# Patient Record
Sex: FEMALE | Race: WHITE | NOT HISPANIC OR LATINO | Employment: OTHER | URBAN - NONMETROPOLITAN AREA
[De-identification: names, ages, dates, MRNs, and addresses within clinical notes are randomized per-mention and may not be internally consistent; named-entity substitution may affect disease eponyms.]

---

## 2017-10-23 ENCOUNTER — LAB REQUISITION (OUTPATIENT)
Dept: LAB | Facility: HOSPITAL | Age: 77
End: 2017-10-23

## 2017-10-23 DIAGNOSIS — Z00.00 ROUTINE GENERAL MEDICAL EXAMINATION AT A HEALTH CARE FACILITY: ICD-10-CM

## 2017-10-23 LAB
ANION GAP SERPL CALCULATED.3IONS-SCNC: 13 MMOL/L (ref 4–13)
BASOPHILS # BLD AUTO: 0.03 10*3/MM3 (ref 0–0.2)
BASOPHILS NFR BLD AUTO: 0.4 % (ref 0–2)
BUN BLD-MCNC: 16 MG/DL (ref 5–21)
BUN/CREAT SERPL: 20.5 (ref 7–25)
CALCIUM SPEC-SCNC: 8.9 MG/DL (ref 8.4–10.4)
CHLORIDE SERPL-SCNC: 107 MMOL/L (ref 98–110)
CO2 SERPL-SCNC: 27 MMOL/L (ref 24–31)
CREAT BLD-MCNC: 0.78 MG/DL (ref 0.5–1.4)
DEPRECATED RDW RBC AUTO: 46.1 FL (ref 40–54)
EOSINOPHIL # BLD AUTO: 0.33 10*3/MM3 (ref 0–0.7)
EOSINOPHIL NFR BLD AUTO: 4.8 % (ref 0–4)
ERYTHROCYTE [DISTWIDTH] IN BLOOD BY AUTOMATED COUNT: 13.5 % (ref 12–15)
GFR SERPL CREATININE-BSD FRML MDRD: 71 ML/MIN/1.73
GLUCOSE BLD-MCNC: 122 MG/DL (ref 70–100)
HCT VFR BLD AUTO: 39.6 % (ref 37–47)
HGB BLD-MCNC: 12.2 G/DL (ref 12–16)
IMM GRANULOCYTES # BLD: 0.01 10*3/MM3 (ref 0–0.03)
IMM GRANULOCYTES NFR BLD: 0.1 % (ref 0–5)
LYMPHOCYTES # BLD AUTO: 1.89 10*3/MM3 (ref 0.72–4.86)
LYMPHOCYTES NFR BLD AUTO: 27.4 % (ref 15–45)
MCH RBC QN AUTO: 28.8 PG (ref 28–32)
MCHC RBC AUTO-ENTMCNC: 30.8 G/DL (ref 33–36)
MCV RBC AUTO: 93.4 FL (ref 82–98)
MONOCYTES # BLD AUTO: 0.62 10*3/MM3 (ref 0.19–1.3)
MONOCYTES NFR BLD AUTO: 9 % (ref 4–12)
NEUTROPHILS # BLD AUTO: 4.02 10*3/MM3 (ref 1.87–8.4)
NEUTROPHILS NFR BLD AUTO: 58.3 % (ref 39–78)
PLATELET # BLD AUTO: 199 10*3/MM3 (ref 130–400)
PMV BLD AUTO: 9.8 FL (ref 6–12)
POTASSIUM BLD-SCNC: 4.1 MMOL/L (ref 3.5–5.3)
RBC # BLD AUTO: 4.24 10*6/MM3 (ref 4.2–5.4)
SODIUM BLD-SCNC: 147 MMOL/L (ref 135–145)
WBC NRBC COR # BLD: 6.9 10*3/MM3 (ref 4.8–10.8)

## 2017-10-23 PROCEDURE — 85025 COMPLETE CBC W/AUTO DIFF WBC: CPT

## 2017-10-23 PROCEDURE — 80048 BASIC METABOLIC PNL TOTAL CA: CPT

## 2018-01-22 RX ORDER — DONEPEZIL HYDROCHLORIDE 5 MG/1
TABLET, FILM COATED ORAL
Qty: 30 TABLET | Refills: 5 | Status: SHIPPED | OUTPATIENT
Start: 2018-01-22 | End: 2019-12-31

## 2018-09-28 DIAGNOSIS — G89.4 CHRONIC PAIN SYNDROME: Primary | ICD-10-CM

## 2018-09-28 RX ORDER — TRAMADOL HYDROCHLORIDE 50 MG/1
50 TABLET ORAL 2 TIMES DAILY
Qty: 60 TABLET | Refills: 5 | OUTPATIENT
Start: 2018-09-28 | End: 2018-10-28

## 2019-02-02 ENCOUNTER — LAB REQUISITION (OUTPATIENT)
Dept: LAB | Facility: HOSPITAL | Age: 79
End: 2019-02-02

## 2019-02-02 DIAGNOSIS — Z00.00 ROUTINE GENERAL MEDICAL EXAMINATION AT A HEALTH CARE FACILITY: ICD-10-CM

## 2019-02-02 LAB
ALBUMIN SERPL-MCNC: 3.8 G/DL (ref 3.5–5)
ALBUMIN/GLOB SERPL: 1.1 G/DL (ref 1.1–2.5)
ALP SERPL-CCNC: 216 U/L (ref 24–120)
ALT SERPL W P-5'-P-CCNC: 885 U/L (ref 0–54)
ANION GAP SERPL CALCULATED.3IONS-SCNC: 12 MMOL/L (ref 4–13)
ANISOCYTOSIS BLD QL: ABNORMAL
AST SERPL-CCNC: 421 U/L (ref 7–45)
BILIRUB SERPL-MCNC: 0.6 MG/DL (ref 0.1–1)
BUN BLD-MCNC: 45 MG/DL (ref 5–21)
BUN/CREAT SERPL: 43.7 (ref 7–25)
CALCIUM SPEC-SCNC: 8.8 MG/DL (ref 8.4–10.4)
CHLORIDE SERPL-SCNC: 110 MMOL/L (ref 98–110)
CO2 SERPL-SCNC: 20 MMOL/L (ref 24–31)
CREAT BLD-MCNC: 1.03 MG/DL (ref 0.5–1.4)
DACRYOCYTES BLD QL SMEAR: ABNORMAL
DEPRECATED RDW RBC AUTO: 52.4 FL (ref 40–54)
EOSINOPHIL # BLD MANUAL: 0.51 10*3/MM3 (ref 0–0.7)
EOSINOPHIL NFR BLD MANUAL: 5.2 % (ref 0–4)
ERYTHROCYTE [DISTWIDTH] IN BLOOD BY AUTOMATED COUNT: 15.5 % (ref 12–15)
GFR SERPL CREATININE-BSD FRML MDRD: 52 ML/MIN/1.73
GLOBULIN UR ELPH-MCNC: 3.5 GM/DL
GLUCOSE BLD-MCNC: 202 MG/DL (ref 70–100)
HCT VFR BLD AUTO: 41.8 % (ref 37–47)
HGB BLD-MCNC: 13.2 G/DL (ref 12–16)
LYMPHOCYTES # BLD MANUAL: 0.81 10*3/MM3 (ref 0.72–4.86)
LYMPHOCYTES NFR BLD MANUAL: 10.3 % (ref 4–12)
LYMPHOCYTES NFR BLD MANUAL: 8.2 % (ref 15–45)
MCH RBC QN AUTO: 29.1 PG (ref 28–32)
MCHC RBC AUTO-ENTMCNC: 31.6 G/DL (ref 33–36)
MCV RBC AUTO: 92.3 FL (ref 82–98)
METAMYELOCYTES NFR BLD MANUAL: 2.1 % (ref 0–0)
MONOCYTES # BLD AUTO: 1.02 10*3/MM3 (ref 0.19–1.3)
NEUTROPHILS # BLD AUTO: 7.12 10*3/MM3 (ref 1.87–8.4)
NEUTROPHILS NFR BLD MANUAL: 71.1 % (ref 39–78)
NEUTS BAND NFR BLD MANUAL: 1 % (ref 0–10)
PLAT MORPH BLD: NORMAL
PLATELET # BLD AUTO: 207 10*3/MM3 (ref 130–400)
PMV BLD AUTO: 10.4 FL (ref 6–12)
POIKILOCYTOSIS BLD QL SMEAR: ABNORMAL
POTASSIUM BLD-SCNC: 4.9 MMOL/L (ref 3.5–5.3)
PROT SERPL-MCNC: 7.3 G/DL (ref 6.3–8.7)
RBC # BLD AUTO: 4.53 10*6/MM3 (ref 4.2–5.4)
SODIUM BLD-SCNC: 142 MMOL/L (ref 135–145)
VARIANT LYMPHS NFR BLD MANUAL: 2.1 % (ref 0–5)
WBC MORPH BLD: NORMAL
WBC NRBC COR # BLD: 9.86 10*3/MM3 (ref 4.8–10.8)

## 2019-02-02 PROCEDURE — 80053 COMPREHEN METABOLIC PANEL: CPT

## 2019-02-02 PROCEDURE — 85025 COMPLETE CBC W/AUTO DIFF WBC: CPT

## 2019-02-02 PROCEDURE — 85007 BL SMEAR W/DIFF WBC COUNT: CPT

## 2019-12-03 ENCOUNTER — CARE COORDINATION (OUTPATIENT)
Dept: CASE MANAGEMENT | Age: 79
End: 2019-12-03

## 2019-12-31 ENCOUNTER — APPOINTMENT (OUTPATIENT)
Dept: GENERAL RADIOLOGY | Age: 79
End: 2019-12-31
Payer: MEDICARE

## 2019-12-31 ENCOUNTER — APPOINTMENT (OUTPATIENT)
Dept: CT IMAGING | Age: 79
End: 2019-12-31
Payer: MEDICARE

## 2019-12-31 ENCOUNTER — HOSPITAL ENCOUNTER (EMERGENCY)
Age: 79
Discharge: ANOTHER HOSPITAL | End: 2019-12-31
Attending: EMERGENCY MEDICINE
Payer: MEDICARE

## 2019-12-31 VITALS
RESPIRATION RATE: 20 BRPM | TEMPERATURE: 98.5 F | HEART RATE: 90 BPM | DIASTOLIC BLOOD PRESSURE: 98 MMHG | OXYGEN SATURATION: 94 % | SYSTOLIC BLOOD PRESSURE: 195 MMHG

## 2019-12-31 LAB
ALBUMIN SERPL-MCNC: 2.7 G/DL (ref 3.5–5.2)
ALP BLD-CCNC: 711 U/L (ref 35–104)
ALT SERPL-CCNC: 120 U/L (ref 5–33)
ANION GAP SERPL CALCULATED.3IONS-SCNC: 13 MMOL/L (ref 7–19)
AST SERPL-CCNC: 74 U/L (ref 5–32)
BILIRUB SERPL-MCNC: 5.2 MG/DL (ref 0.2–1.2)
BUN BLDV-MCNC: 15 MG/DL (ref 8–23)
CALCIUM SERPL-MCNC: 9.1 MG/DL (ref 8.8–10.2)
CHLORIDE BLD-SCNC: 104 MMOL/L (ref 98–111)
CO2: 22 MMOL/L (ref 22–29)
CREAT SERPL-MCNC: <0.5 MG/DL (ref 0.5–0.9)
GFR NON-AFRICAN AMERICAN: >60
GLUCOSE BLD-MCNC: 209 MG/DL (ref 74–109)
HCT VFR BLD CALC: 32.4 % (ref 37–47)
HEMOGLOBIN: 10.1 G/DL (ref 12–16)
INR BLD: 1.04 (ref 0.88–1.18)
LACTIC ACID: 1.6 MMOL/L (ref 0.5–1.9)
LIPASE: 17 U/L (ref 13–60)
MCH RBC QN AUTO: 28.9 PG (ref 27–31)
MCHC RBC AUTO-ENTMCNC: 31.2 G/DL (ref 33–37)
MCV RBC AUTO: 92.6 FL (ref 81–99)
PDW BLD-RTO: 15.8 % (ref 11.5–14.5)
PLATELET # BLD: 296 K/UL (ref 130–400)
PMV BLD AUTO: 9.8 FL (ref 9.4–12.3)
POTASSIUM SERPL-SCNC: 4.3 MMOL/L (ref 3.5–5)
PROTHROMBIN TIME: 13 SEC (ref 12–14.6)
RAPID INFLUENZA  B AGN: NEGATIVE
RAPID INFLUENZA A AGN: NEGATIVE
RBC # BLD: 3.5 M/UL (ref 4.2–5.4)
SODIUM BLD-SCNC: 139 MMOL/L (ref 136–145)
TOTAL PROTEIN: 6.5 G/DL (ref 6.6–8.7)
WBC # BLD: 20 K/UL (ref 4.8–10.8)

## 2019-12-31 PROCEDURE — 99285 EMERGENCY DEPT VISIT HI MDM: CPT

## 2019-12-31 PROCEDURE — 80053 COMPREHEN METABOLIC PANEL: CPT

## 2019-12-31 PROCEDURE — 2580000003 HC RX 258: Performed by: EMERGENCY MEDICINE

## 2019-12-31 PROCEDURE — 96375 TX/PRO/DX INJ NEW DRUG ADDON: CPT

## 2019-12-31 PROCEDURE — 87040 BLOOD CULTURE FOR BACTERIA: CPT

## 2019-12-31 PROCEDURE — 6360000004 HC RX CONTRAST MEDICATION: Performed by: EMERGENCY MEDICINE

## 2019-12-31 PROCEDURE — 6360000002 HC RX W HCPCS: Performed by: EMERGENCY MEDICINE

## 2019-12-31 PROCEDURE — 96365 THER/PROPH/DIAG IV INF INIT: CPT

## 2019-12-31 PROCEDURE — 36415 COLL VENOUS BLD VENIPUNCTURE: CPT

## 2019-12-31 PROCEDURE — 2500000003 HC RX 250 WO HCPCS: Performed by: EMERGENCY MEDICINE

## 2019-12-31 PROCEDURE — 85610 PROTHROMBIN TIME: CPT

## 2019-12-31 PROCEDURE — 87804 INFLUENZA ASSAY W/OPTIC: CPT

## 2019-12-31 PROCEDURE — 83690 ASSAY OF LIPASE: CPT

## 2019-12-31 PROCEDURE — 83605 ASSAY OF LACTIC ACID: CPT

## 2019-12-31 PROCEDURE — 85027 COMPLETE CBC AUTOMATED: CPT

## 2019-12-31 PROCEDURE — 96366 THER/PROPH/DIAG IV INF ADDON: CPT

## 2019-12-31 PROCEDURE — 71045 X-RAY EXAM CHEST 1 VIEW: CPT

## 2019-12-31 PROCEDURE — 74177 CT ABD & PELVIS W/CONTRAST: CPT

## 2019-12-31 PROCEDURE — 70450 CT HEAD/BRAIN W/O DYE: CPT

## 2019-12-31 RX ORDER — TRAZODONE HYDROCHLORIDE 150 MG/1
150 TABLET ORAL NIGHTLY
Status: ON HOLD | COMMUNITY
End: 2020-05-20 | Stop reason: HOSPADM

## 2019-12-31 RX ORDER — HALOPERIDOL 1 MG/1
1 TABLET ORAL 3 TIMES DAILY
COMMUNITY

## 2019-12-31 RX ORDER — FLUDROCORTISONE ACETATE 0.1 MG/1
0.1 TABLET ORAL DAILY
COMMUNITY

## 2019-12-31 RX ORDER — SODIUM CHLORIDE 9 MG/ML
INJECTION, SOLUTION INTRAVENOUS CONTINUOUS
Status: DISCONTINUED | OUTPATIENT
Start: 2019-12-31 | End: 2019-12-31 | Stop reason: HOSPADM

## 2019-12-31 RX ORDER — POTASSIUM CHLORIDE 750 MG/1
10 TABLET, EXTENDED RELEASE ORAL DAILY
Status: ON HOLD | COMMUNITY
End: 2020-05-20 | Stop reason: HOSPADM

## 2019-12-31 RX ORDER — POLYETHYLENE GLYCOL 3350 17 G/17G
17 POWDER, FOR SOLUTION ORAL DAILY PRN
COMMUNITY

## 2019-12-31 RX ORDER — MEGESTROL ACETATE 40 MG/1
40 TABLET ORAL DAILY
COMMUNITY

## 2019-12-31 RX ORDER — SENNA PLUS 8.6 MG/1
1 TABLET ORAL 2 TIMES DAILY
COMMUNITY

## 2019-12-31 RX ADMIN — METRONIDAZOLE 500 MG: 500 INJECTION, SOLUTION INTRAVENOUS at 15:57

## 2019-12-31 RX ADMIN — SODIUM CHLORIDE: 9 INJECTION, SOLUTION INTRAVENOUS at 17:58

## 2019-12-31 RX ADMIN — IOPAMIDOL 90 ML: 755 INJECTION, SOLUTION INTRAVENOUS at 15:35

## 2019-12-31 RX ADMIN — CEFEPIME 2 G: 2 INJECTION, POWDER, FOR SOLUTION INTRAVENOUS at 15:44

## 2019-12-31 NOTE — ED PROVIDER NOTES
dailyHistorical Med      haloperidol (HALDOL) 1 MG tablet Take 1 mg by mouth 4 times dailyHistorical Med      potassium chloride (KLOR-CON M) 10 MEQ extended release tablet Take 10 mEq by mouth 2 times dailyHistorical Med      megestrol (MEGACE) 40 MG tablet Take 40 mg by mouth dailyHistorical Med      magnesium hydroxide (MILK OF MAGNESIA) 400 MG/5ML suspension Take by mouth daily as needed for ConstipationHistorical Med      polyethylene glycol (GLYCOLAX) packet Take 17 g by mouth daily as needed for ConstipationHistorical Med      senna (SENOKOT) 8.6 MG tablet Take 1 tablet by mouth dailyHistorical Med      traZODone (DESYREL) 150 MG tablet Take 150 mg by mouth nightlyHistorical Med      lisinopril (PRINIVIL;ZESTRIL) 40 MG tablet Take 1 tablet by mouth daily, Disp-30 tablet, R-0      risperiDONE (RISPERDAL) 0.25 MG tablet Take 1 tablet by mouth 2 times daily, Disp-60 tablet, R-0      amLODIPine (NORVASC) 2.5 MG tablet Take 1 tablet by mouth daily, Disp-30 tablet, R-0      mirtazapine (REMERON SOL-TAB) 15 MG disintegrating tablet Take 0.5 tablets by mouth nightly, Disp-15 tablet, R-0             ALLERGIES     Sulfa antibiotics and Ultram [tramadol]    FAMILY HISTORY     History reviewed. No pertinent family history. SOCIAL HISTORY       Social History     Socioeconomic History    Marital status:       Spouse name: None    Number of children: None    Years of education: None    Highest education level: None   Occupational History    None   Social Needs    Financial resource strain: None    Food insecurity:     Worry: None     Inability: None    Transportation needs:     Medical: None     Non-medical: None   Tobacco Use    Smoking status: Never Smoker    Smokeless tobacco: Never Used   Substance and Sexual Activity    Alcohol use: No     Alcohol/week: 0.0 standard drinks    Drug use: No    Sexual activity: None   Lifestyle    Physical activity:     Days per week: None     Minutes per session: None    Stress: None   Relationships    Social connections:     Talks on phone: None     Gets together: None     Attends Sikhism service: None     Active member of club or organization: None     Attends meetings of clubs or organizations: None     Relationship status: None    Intimate partner violence:     Fear of current or ex partner: None     Emotionally abused: None     Physically abused: None     Forced sexual activity: None   Other Topics Concern    None   Social History Narrative    None       SCREENINGS             PHYSICAL EXAM    (up to 7 for level 4, 8 or more for level 5)     ED Triage Vitals [12/31/19 1345]   BP Temp Temp Source Pulse Resp SpO2 Height Weight   118/64 98.5 °F (36.9 °C) Oral 85 20 91 % -- --       Physical Exam  Vitals signs reviewed. Constitutional:       General: She is sleeping. She is not in acute distress. Appearance: She is well-developed. She is ill-appearing. She is not toxic-appearing. HENT:      Head: Normocephalic and atraumatic. Right Ear: External ear normal.      Left Ear: External ear normal.      Mouth/Throat:      Mouth: Mucous membranes are moist.      Pharynx: Oropharynx is clear. Eyes:      General: Scleral icterus present. Pupils: Pupils are equal, round, and reactive to light. Neck:      Musculoskeletal: Normal range of motion and neck supple. No neck rigidity. Vascular: No JVD. Cardiovascular:      Rate and Rhythm: Normal rate and regular rhythm. Pulses: Normal pulses. Heart sounds: Normal heart sounds. Pulmonary:      Effort: Pulmonary effort is normal. No respiratory distress. Breath sounds: Normal breath sounds. Abdominal:      General: There is no distension. Palpations: Abdomen is soft. There is no pulsatile mass. Tenderness: There is generalized tenderness. There is no guarding or rebound. Musculoskeletal:         General: No tenderness.    Lymphadenopathy:      Cervical: No cervical adenopathy. Skin:     General: Skin is warm and dry. Capillary Refill: Capillary refill takes less than 2 seconds. Coloration: Skin is jaundiced. Neurological:      Mental Status: She is easily aroused. She is confused. Comments: Moving all 4 extremities equally. Limited exam.   Psychiatric:         Behavior: Behavior normal.         DIAGNOSTIC RESULTS     RADIOLOGY:   Non-plain film images such as CT, Ultrasound and MRI are read by the radiologist. [facility] images are visualized and preliminarily interpreted by the emergency physician with the below findings:    Interpretation per the Radiologist below, if available at the time of this note:    CT Head WO Contrast   Final Result   1. No acute intracranial findings. 2. Similar mild chronic microvascular changes and volume loss. Signed by Dr Eric August on 12/31/2019 3:51 PM      CT ABDOMEN PELVIS W IV CONTRAST Additional Contrast? None   Final Result   Marked dilatation of the common bile duct and intrahepatic   biliary ducts. The semiopaque objects in the distal common bile duct   which may represent sludge or nonopaque stones and the resultant   common duct obstruction. Further follow-up may be obtained. The diverticulosis of the distal colon. No evidence for   diverticulitis. A very small ill-defined low-density nodule in the tail of the   pancreas which may represent fat entrapment and a possible partial   volume averaging. A follow-up study in 6 was may be obtained to ensure   stability/resolution. A fat-containing left femoral hernia. Above findings are recorded on a digital voice clip in PACS. Signed by Dr Norma Arzola on 12/31/2019 4:03 PM      XR CHEST PORTABLE   Final Result   . No acute disease.    Signed by Dr Nicole Laguna on 12/31/2019 3:06 PM            LABS:  Labs Reviewed   CBC - Abnormal; Notable for the following components:       Result Value    WBC 20.0 (*)     RBC 3.50 (*)     Hemoglobin 10.1 (*) Hematocrit 32.4 (*)     MCHC 31.2 (*)     RDW 15.8 (*)     All other components within normal limits   COMPREHENSIVE METABOLIC PANEL - Abnormal; Notable for the following components:    Glucose 209 (*)     Total Protein 6.5 (*)     Alb 2.7 (*)     Total Bilirubin 5.2 (*)     Alkaline Phosphatase 711 (*)      (*)     AST 74 (*)     All other components within normal limits   RAPID INFLUENZA A/B ANTIGENS   CULTURE BLOOD #1   CULTURE BLOOD #2   LIPASE   LACTIC ACID, PLASMA   PROTIME-INR   URINE RT REFLEX TO CULTURE       All other labs were within normal range or not returned as of this dictation. EMERGENCY DEPARTMENT COURSE and DIFFERENTIALDIAGNOSIS/MDM:   Vitals:    Vitals:    12/31/19 1503 12/31/19 1602 12/31/19 1653 12/31/19 1732   BP: 137/65 (!) 184/75 (!) 183/89 (!) 195/98   Pulse: 86 86 89 90   Resp: 20 24 19 20   Temp:       TempSrc:       SpO2:  93% 94%        MDM  Obtained copy of ultrasound that had been done at nursing home which showed dilated common duct suspicious for an CBD obstruction and fatty liver. Case discussed with Dr. Kris Troncoso, GI. He said patient needs ERCP and we have no ERCP available. Recommend transfer to higher level of care. Will contact [address] [place] [address] see about transfer there. Patient's POA is here and updated about plan. Patient resting comfortably at this time. Patient's POA tells me that patient's confusion is baseline for her. [place] on diversion. [place] recommended transfer to [facility].  I spoke with Sylwia Bills who is a nurse practitioner working with Dr. Hang Dang, hospitalist.  Patient will be accepted on behalf of Dr. Hang Dang to [facility].  They have ERCP available there. Patient's POA updated about plan. Patient resting comfortably at this time. CONSULTS:  IP CONSULT TO GI    PROCEDURES:  Unless otherwise notedbelow, none     Procedures    FINAL IMPRESSION     1. Cholangitis    2.  Abnormal CT scan          DISPOSITION/PLAN   DISPOSITION Decision To Transfer 12/31/2019 04:44:18 PM      PATIENT REFERRED TO:  @FUP@    DISCHARGE MEDICATIONS:  Discharge Medication List as of 12/31/2019  7:33 PM             (Please note that portions of this note were completed with a voice recognition program.  Efforts were made to edit the dictations butoccasionally words are mis-transcribed.)    Anastasia Burdick MD (electronically signed)  AttendingEmergency Physician        Anastasia Burdick MD  12/31/19 3930

## 2019-12-31 NOTE — ED NOTES
Bed: 01-A  Expected date:   Expected time:   Means of arrival:   Comments:  EMS [address], RN  12/31/19 8752

## 2020-01-05 LAB
BLOOD CULTURE, ROUTINE: NORMAL
CULTURE, BLOOD 2: NORMAL

## 2020-01-08 RX ORDER — HYDROCODONE BITARTRATE AND ACETAMINOPHEN 5; 325 MG/1; MG/1
1 TABLET ORAL EVERY 6 HOURS PRN
Qty: 10 TABLET | Refills: 0 | Status: SHIPPED | OUTPATIENT
Start: 2020-01-08 | End: 2020-01-24

## 2020-01-24 RX ORDER — HYDROCODONE BITARTRATE AND ACETAMINOPHEN 5; 325 MG/1; MG/1
TABLET ORAL
Qty: 120 TABLET | Refills: 0 | Status: SHIPPED | OUTPATIENT
Start: 2020-01-24 | End: 2020-06-01

## 2020-02-05 ENCOUNTER — CARE COORDINATION (OUTPATIENT)
Dept: CASE MANAGEMENT | Age: 80
End: 2020-02-05

## 2020-02-05 NOTE — CARE COORDINATION
Patient is now LTC at [facility] on 2/1/20.       Gerardo Pierce, EMILIEN     [address] [facility] / [facility]

## 2020-02-13 RX ORDER — FENTANYL 25 UG/H
1 PATCH TRANSDERMAL
Qty: 10 PATCH | Refills: 0 | Status: SHIPPED | OUTPATIENT
Start: 2020-02-13 | End: 2020-03-04

## 2020-03-04 RX ORDER — FENTANYL 25 UG/H
PATCH TRANSDERMAL
Qty: 10 PATCH | Refills: 0 | Status: SHIPPED | OUTPATIENT
Start: 2020-03-04 | End: 2020-03-30

## 2020-03-30 RX ORDER — FENTANYL 25 UG/H
PATCH TRANSDERMAL
Qty: 10 PATCH | Refills: 0 | Status: SHIPPED | OUTPATIENT
Start: 2020-03-30 | End: 2020-05-11

## 2020-05-11 RX ORDER — FENTANYL 25 UG/H
PATCH TRANSDERMAL
Qty: 10 PATCH | Refills: 0 | Status: ON HOLD
Start: 2020-05-11 | End: 2020-05-20 | Stop reason: HOSPADM

## 2020-05-16 ENCOUNTER — APPOINTMENT (OUTPATIENT)
Dept: CT IMAGING | Age: 80
DRG: 175 | End: 2020-05-16
Payer: MEDICARE

## 2020-05-16 ENCOUNTER — APPOINTMENT (OUTPATIENT)
Dept: GENERAL RADIOLOGY | Age: 80
DRG: 175 | End: 2020-05-16
Payer: MEDICARE

## 2020-05-16 ENCOUNTER — HOSPITAL ENCOUNTER (INPATIENT)
Age: 80
LOS: 4 days | Discharge: NURSING, REHAB OR LONG-TERM CARE | DRG: 175 | End: 2020-05-20
Attending: EMERGENCY MEDICINE | Admitting: INTERNAL MEDICINE
Payer: MEDICARE

## 2020-05-16 PROBLEM — I26.99 ACUTE PULMONARY EMBOLISM WITHOUT ACUTE COR PULMONALE (HCC): Status: ACTIVE | Noted: 2020-05-16

## 2020-05-16 LAB
ALBUMIN SERPL-MCNC: 3.3 G/DL (ref 3.5–5.2)
ALP BLD-CCNC: 121 U/L (ref 35–104)
ALT SERPL-CCNC: 22 U/L (ref 5–33)
AMMONIA: 20 UMOL/L (ref 11–51)
ANION GAP SERPL CALCULATED.3IONS-SCNC: 15 MMOL/L (ref 7–19)
APTT: 20 SEC (ref 26–36.2)
AST SERPL-CCNC: 25 U/L (ref 5–32)
BASE EXCESS ARTERIAL: 0.6 MMOL/L (ref -2–2)
BASOPHILS ABSOLUTE: 0.1 K/UL (ref 0–0.2)
BASOPHILS RELATIVE PERCENT: 0.5 % (ref 0–1)
BILIRUB SERPL-MCNC: 0.5 MG/DL (ref 0.2–1.2)
BUN BLDV-MCNC: 40 MG/DL (ref 8–23)
CALCIUM SERPL-MCNC: 9.5 MG/DL (ref 8.8–10.2)
CARBOXYHEMOGLOBIN ARTERIAL: 2.6 % (ref 0–5)
CHLORIDE BLD-SCNC: 113 MMOL/L (ref 98–111)
CO2: 22 MMOL/L (ref 22–29)
CREAT SERPL-MCNC: 0.9 MG/DL (ref 0.5–0.9)
EOSINOPHILS ABSOLUTE: 0 K/UL (ref 0–0.6)
EOSINOPHILS RELATIVE PERCENT: 0.3 % (ref 0–5)
GFR NON-AFRICAN AMERICAN: 60
GLUCOSE BLD-MCNC: 310 MG/DL (ref 74–109)
HBA1C MFR BLD: 6.8 % (ref 4–6)
HCO3 ARTERIAL: 22.6 MMOL/L (ref 22–26)
HCT VFR BLD CALC: 47.6 % (ref 37–47)
HEMOGLOBIN, ART, EXTENDED: 15.7 G/DL (ref 12–16)
HEMOGLOBIN: 14.9 G/DL (ref 12–16)
IMMATURE GRANULOCYTES #: 0.2 K/UL
INR BLD: 1.09 (ref 0.88–1.18)
LACTIC ACID: 3 MMOL/L (ref 0.5–1.9)
LYMPHOCYTES ABSOLUTE: 1.9 K/UL (ref 1.1–4.5)
LYMPHOCYTES RELATIVE PERCENT: 12.5 % (ref 20–40)
MCH RBC QN AUTO: 29 PG (ref 27–31)
MCHC RBC AUTO-ENTMCNC: 31.3 G/DL (ref 33–37)
MCV RBC AUTO: 92.6 FL (ref 81–99)
METHEMOGLOBIN ARTERIAL: 1 %
MONOCYTES ABSOLUTE: 0.7 K/UL (ref 0–0.9)
MONOCYTES RELATIVE PERCENT: 4.6 % (ref 0–10)
NEUTROPHILS ABSOLUTE: 12.5 K/UL (ref 1.5–7.5)
NEUTROPHILS RELATIVE PERCENT: 80.7 % (ref 50–65)
O2 CONTENT ARTERIAL: 19.8 ML/DL
O2 SAT, ARTERIAL: 90.1 %
O2 THERAPY: ABNORMAL
PCO2 ARTERIAL: 29 MMHG (ref 35–45)
PDW BLD-RTO: 15.9 % (ref 11.5–14.5)
PH ARTERIAL: 7.5 (ref 7.35–7.45)
PLATELET # BLD: 186 K/UL (ref 130–400)
PMV BLD AUTO: 10.4 FL (ref 9.4–12.3)
PO2 ARTERIAL: 54 MMHG (ref 80–100)
POTASSIUM REFLEX MAGNESIUM: 4 MMOL/L (ref 3.5–5)
POTASSIUM, WHOLE BLOOD: 3.8
PRO-BNP: 517 PG/ML (ref 0–1800)
PROTHROMBIN TIME: 14.1 SEC (ref 12–14.6)
RBC # BLD: 5.14 M/UL (ref 4.2–5.4)
SODIUM BLD-SCNC: 150 MMOL/L (ref 136–145)
TOTAL PROTEIN: 7.6 G/DL (ref 6.6–8.7)
TROPONIN: 0.06 NG/ML (ref 0–0.03)
WBC # BLD: 15.5 K/UL (ref 4.8–10.8)

## 2020-05-16 PROCEDURE — 6370000000 HC RX 637 (ALT 250 FOR IP): Performed by: EMERGENCY MEDICINE

## 2020-05-16 PROCEDURE — 82803 BLOOD GASES ANY COMBINATION: CPT

## 2020-05-16 PROCEDURE — 70450 CT HEAD/BRAIN W/O DYE: CPT

## 2020-05-16 PROCEDURE — 96374 THER/PROPH/DIAG INJ IV PUSH: CPT

## 2020-05-16 PROCEDURE — 82140 ASSAY OF AMMONIA: CPT

## 2020-05-16 PROCEDURE — 85730 THROMBOPLASTIN TIME PARTIAL: CPT

## 2020-05-16 PROCEDURE — 6360000004 HC RX CONTRAST MEDICATION: Performed by: EMERGENCY MEDICINE

## 2020-05-16 PROCEDURE — 84132 ASSAY OF SERUM POTASSIUM: CPT

## 2020-05-16 PROCEDURE — 83036 HEMOGLOBIN GLYCOSYLATED A1C: CPT

## 2020-05-16 PROCEDURE — 80053 COMPREHEN METABOLIC PANEL: CPT

## 2020-05-16 PROCEDURE — 71275 CT ANGIOGRAPHY CHEST: CPT

## 2020-05-16 PROCEDURE — 85610 PROTHROMBIN TIME: CPT

## 2020-05-16 PROCEDURE — 87040 BLOOD CULTURE FOR BACTERIA: CPT

## 2020-05-16 PROCEDURE — 2580000003 HC RX 258: Performed by: EMERGENCY MEDICINE

## 2020-05-16 PROCEDURE — 93005 ELECTROCARDIOGRAM TRACING: CPT | Performed by: EMERGENCY MEDICINE

## 2020-05-16 PROCEDURE — 83880 ASSAY OF NATRIURETIC PEPTIDE: CPT

## 2020-05-16 PROCEDURE — 6360000002 HC RX W HCPCS: Performed by: EMERGENCY MEDICINE

## 2020-05-16 PROCEDURE — 2700000000 HC OXYGEN THERAPY PER DAY

## 2020-05-16 PROCEDURE — 83605 ASSAY OF LACTIC ACID: CPT

## 2020-05-16 PROCEDURE — 99285 EMERGENCY DEPT VISIT HI MDM: CPT

## 2020-05-16 PROCEDURE — 85025 COMPLETE CBC W/AUTO DIFF WBC: CPT

## 2020-05-16 PROCEDURE — 84484 ASSAY OF TROPONIN QUANT: CPT

## 2020-05-16 PROCEDURE — 36415 COLL VENOUS BLD VENIPUNCTURE: CPT

## 2020-05-16 PROCEDURE — 71045 X-RAY EXAM CHEST 1 VIEW: CPT

## 2020-05-16 PROCEDURE — 2000000000 HC ICU R&B

## 2020-05-16 PROCEDURE — 36600 WITHDRAWAL OF ARTERIAL BLOOD: CPT

## 2020-05-16 PROCEDURE — 94640 AIRWAY INHALATION TREATMENT: CPT

## 2020-05-16 RX ORDER — IPRATROPIUM BROMIDE AND ALBUTEROL SULFATE 2.5; .5 MG/3ML; MG/3ML
1 SOLUTION RESPIRATORY (INHALATION) ONCE
Status: COMPLETED | OUTPATIENT
Start: 2020-05-16 | End: 2020-05-16

## 2020-05-16 RX ORDER — HEPARIN SODIUM 1000 [USP'U]/ML
80 INJECTION, SOLUTION INTRAVENOUS; SUBCUTANEOUS PRN
Status: DISCONTINUED | OUTPATIENT
Start: 2020-05-16 | End: 2020-05-18 | Stop reason: ALTCHOICE

## 2020-05-16 RX ORDER — SODIUM CHLORIDE, SODIUM LACTATE, POTASSIUM CHLORIDE, AND CALCIUM CHLORIDE .6; .31; .03; .02 G/100ML; G/100ML; G/100ML; G/100ML
30 INJECTION, SOLUTION INTRAVENOUS ONCE
Status: COMPLETED | OUTPATIENT
Start: 2020-05-16 | End: 2020-05-17

## 2020-05-16 RX ORDER — HEPARIN SODIUM 1000 [USP'U]/ML
80 INJECTION, SOLUTION INTRAVENOUS; SUBCUTANEOUS ONCE
Status: COMPLETED | OUTPATIENT
Start: 2020-05-16 | End: 2020-05-16

## 2020-05-16 RX ORDER — HEPARIN SODIUM 10000 [USP'U]/100ML
18 INJECTION, SOLUTION INTRAVENOUS CONTINUOUS
Status: DISCONTINUED | OUTPATIENT
Start: 2020-05-16 | End: 2020-05-18

## 2020-05-16 RX ORDER — NALOXONE HYDROCHLORIDE 0.4 MG/ML
0.2 INJECTION, SOLUTION INTRAMUSCULAR; INTRAVENOUS; SUBCUTANEOUS ONCE
Status: COMPLETED | OUTPATIENT
Start: 2020-05-16 | End: 2020-05-16

## 2020-05-16 RX ORDER — HEPARIN SODIUM 1000 [USP'U]/ML
40 INJECTION, SOLUTION INTRAVENOUS; SUBCUTANEOUS PRN
Status: DISCONTINUED | OUTPATIENT
Start: 2020-05-16 | End: 2020-05-18 | Stop reason: ALTCHOICE

## 2020-05-16 RX ADMIN — HEPARIN SODIUM 3630 UNITS: 1000 INJECTION INTRAVENOUS; SUBCUTANEOUS at 22:27

## 2020-05-16 RX ADMIN — IPRATROPIUM BROMIDE AND ALBUTEROL SULFATE 1 AMPULE: 2.5; .5 SOLUTION RESPIRATORY (INHALATION) at 21:26

## 2020-05-16 RX ADMIN — IOPAMIDOL 90 ML: 755 INJECTION, SOLUTION INTRAVENOUS at 20:50

## 2020-05-16 RX ADMIN — SODIUM CHLORIDE, POTASSIUM CHLORIDE, SODIUM LACTATE AND CALCIUM CHLORIDE 1362 ML: 600; 310; 30; 20 INJECTION, SOLUTION INTRAVENOUS at 21:39

## 2020-05-16 RX ADMIN — HEPARIN SODIUM 18 UNITS/KG/HR: 10000 INJECTION, SOLUTION INTRAVENOUS at 22:30

## 2020-05-16 RX ADMIN — NALXONE HYDROCHLORIDE 0.2 MG: 0.4 INJECTION INTRAMUSCULAR; INTRAVENOUS; SUBCUTANEOUS at 20:44

## 2020-05-17 LAB
ANION GAP SERPL CALCULATED.3IONS-SCNC: 21 MMOL/L (ref 7–19)
APTT: 107.1 SEC (ref 26–36.2)
APTT: 83.9 SEC (ref 26–36.2)
APTT: 90.8 SEC (ref 26–36.2)
APTT: 93 SEC (ref 26–36.2)
BUN BLDV-MCNC: 30 MG/DL (ref 8–23)
CALCIUM SERPL-MCNC: 9.1 MG/DL (ref 8.8–10.2)
CHLORIDE BLD-SCNC: 106 MMOL/L (ref 98–111)
CO2: 23 MMOL/L (ref 22–29)
CREAT SERPL-MCNC: 0.9 MG/DL (ref 0.5–0.9)
GFR NON-AFRICAN AMERICAN: 60
GLUCOSE BLD-MCNC: 230 MG/DL (ref 74–109)
HCT VFR BLD CALC: 48.6 % (ref 37–47)
HEMOGLOBIN: 14.6 G/DL (ref 12–16)
LACTIC ACID, SEPSIS: 4 MG/DL (ref 0.5–1.9)
LACTIC ACID, SEPSIS: 4.6 MG/DL (ref 0.5–1.9)
LV EF: 58 %
LVEF MODALITY: NORMAL
MAGNESIUM: 2.2 MG/DL (ref 1.6–2.4)
MCH RBC QN AUTO: 28.5 PG (ref 27–31)
MCHC RBC AUTO-ENTMCNC: 30 G/DL (ref 33–37)
MCV RBC AUTO: 94.7 FL (ref 81–99)
PDW BLD-RTO: 15.9 % (ref 11.5–14.5)
PLATELET # BLD: 161 K/UL (ref 130–400)
PMV BLD AUTO: 11.3 FL (ref 9.4–12.3)
POTASSIUM REFLEX MAGNESIUM: 3.7 MMOL/L (ref 3.5–5)
RBC # BLD: 5.13 M/UL (ref 4.2–5.4)
SODIUM BLD-SCNC: 150 MMOL/L (ref 136–145)
TROPONIN: 0.05 NG/ML (ref 0–0.03)
TROPONIN: 0.06 NG/ML (ref 0–0.03)
WBC # BLD: 13.8 K/UL (ref 4.8–10.8)

## 2020-05-17 PROCEDURE — 36415 COLL VENOUS BLD VENIPUNCTURE: CPT

## 2020-05-17 PROCEDURE — 2000000000 HC ICU R&B

## 2020-05-17 PROCEDURE — 85730 THROMBOPLASTIN TIME PARTIAL: CPT

## 2020-05-17 PROCEDURE — 6360000002 HC RX W HCPCS: Performed by: HOSPITALIST

## 2020-05-17 PROCEDURE — 2700000000 HC OXYGEN THERAPY PER DAY

## 2020-05-17 PROCEDURE — 51798 US URINE CAPACITY MEASURE: CPT

## 2020-05-17 PROCEDURE — 83735 ASSAY OF MAGNESIUM: CPT

## 2020-05-17 PROCEDURE — 6370000000 HC RX 637 (ALT 250 FOR IP): Performed by: HOSPITALIST

## 2020-05-17 PROCEDURE — 83605 ASSAY OF LACTIC ACID: CPT

## 2020-05-17 PROCEDURE — 93306 TTE W/DOPPLER COMPLETE: CPT

## 2020-05-17 PROCEDURE — 2580000003 HC RX 258: Performed by: HOSPITALIST

## 2020-05-17 PROCEDURE — 87040 BLOOD CULTURE FOR BACTERIA: CPT

## 2020-05-17 PROCEDURE — 84484 ASSAY OF TROPONIN QUANT: CPT

## 2020-05-17 PROCEDURE — 85027 COMPLETE CBC AUTOMATED: CPT

## 2020-05-17 PROCEDURE — 80048 BASIC METABOLIC PNL TOTAL CA: CPT

## 2020-05-17 PROCEDURE — 87081 CULTURE SCREEN ONLY: CPT

## 2020-05-17 RX ORDER — PROMETHAZINE HYDROCHLORIDE 12.5 MG/1
12.5 TABLET ORAL EVERY 6 HOURS PRN
Status: DISCONTINUED | OUTPATIENT
Start: 2020-05-17 | End: 2020-05-20 | Stop reason: HOSPADM

## 2020-05-17 RX ORDER — HYDROCODONE BITARTRATE AND ACETAMINOPHEN 5; 325 MG/1; MG/1
1 TABLET ORAL 2 TIMES DAILY
Status: ON HOLD | COMMUNITY
End: 2020-05-20 | Stop reason: HOSPADM

## 2020-05-17 RX ORDER — SODIUM CHLORIDE 0.9 % (FLUSH) 0.9 %
10 SYRINGE (ML) INJECTION PRN
Status: DISCONTINUED | OUTPATIENT
Start: 2020-05-17 | End: 2020-05-17 | Stop reason: SDUPTHER

## 2020-05-17 RX ORDER — HALOPERIDOL 1 MG/1
1 TABLET ORAL 4 TIMES DAILY
Status: DISCONTINUED | OUTPATIENT
Start: 2020-05-17 | End: 2020-05-17

## 2020-05-17 RX ORDER — SODIUM CHLORIDE 0.9 % (FLUSH) 0.9 %
10 SYRINGE (ML) INJECTION PRN
Status: DISCONTINUED | OUTPATIENT
Start: 2020-05-17 | End: 2020-05-20 | Stop reason: HOSPADM

## 2020-05-17 RX ORDER — FENTANYL 25 UG/H
1 PATCH TRANSDERMAL
Status: DISCONTINUED | OUTPATIENT
Start: 2020-05-17 | End: 2020-05-17

## 2020-05-17 RX ORDER — ACETAMINOPHEN 325 MG/1
650 TABLET ORAL EVERY 6 HOURS PRN
Status: DISCONTINUED | OUTPATIENT
Start: 2020-05-17 | End: 2020-05-20 | Stop reason: HOSPADM

## 2020-05-17 RX ORDER — HYDRALAZINE HYDROCHLORIDE 20 MG/ML
10 INJECTION INTRAMUSCULAR; INTRAVENOUS EVERY 6 HOURS PRN
Status: DISCONTINUED | OUTPATIENT
Start: 2020-05-17 | End: 2020-05-20 | Stop reason: HOSPADM

## 2020-05-17 RX ORDER — LACTULOSE 10 G/15ML
30 SOLUTION ORAL DAILY PRN
COMMUNITY

## 2020-05-17 RX ORDER — SODIUM CHLORIDE 9 MG/ML
INJECTION, SOLUTION INTRAVENOUS CONTINUOUS
Status: DISCONTINUED | OUTPATIENT
Start: 2020-05-17 | End: 2020-05-18

## 2020-05-17 RX ORDER — SENNA PLUS 8.6 MG/1
1 TABLET ORAL DAILY
Status: DISCONTINUED | OUTPATIENT
Start: 2020-05-17 | End: 2020-05-20 | Stop reason: HOSPADM

## 2020-05-17 RX ORDER — HALOPERIDOL 5 MG/ML
2 INJECTION INTRAMUSCULAR EVERY 6 HOURS PRN
Status: DISCONTINUED | OUTPATIENT
Start: 2020-05-17 | End: 2020-05-20 | Stop reason: HOSPADM

## 2020-05-17 RX ORDER — BISACODYL 10 MG
10 SUPPOSITORY, RECTAL RECTAL DAILY PRN
COMMUNITY

## 2020-05-17 RX ORDER — ONDANSETRON 2 MG/ML
4 INJECTION INTRAMUSCULAR; INTRAVENOUS EVERY 6 HOURS PRN
Status: DISCONTINUED | OUTPATIENT
Start: 2020-05-17 | End: 2020-05-20 | Stop reason: HOSPADM

## 2020-05-17 RX ORDER — POLYETHYLENE GLYCOL 3350 17 G/17G
17 POWDER, FOR SOLUTION ORAL DAILY PRN
Status: DISCONTINUED | OUTPATIENT
Start: 2020-05-17 | End: 2020-05-20 | Stop reason: HOSPADM

## 2020-05-17 RX ORDER — RISPERIDONE 0.25 MG/1
0.25 TABLET, FILM COATED ORAL 2 TIMES DAILY
Status: DISCONTINUED | OUTPATIENT
Start: 2020-05-17 | End: 2020-05-17

## 2020-05-17 RX ORDER — SODIUM CHLORIDE 0.9 % (FLUSH) 0.9 %
10 SYRINGE (ML) INJECTION EVERY 12 HOURS SCHEDULED
Status: DISCONTINUED | OUTPATIENT
Start: 2020-05-17 | End: 2020-05-17 | Stop reason: SDUPTHER

## 2020-05-17 RX ORDER — MIRTAZAPINE 15 MG/1
7.5 TABLET, ORALLY DISINTEGRATING ORAL NIGHTLY
Status: DISCONTINUED | OUTPATIENT
Start: 2020-05-17 | End: 2020-05-17

## 2020-05-17 RX ORDER — ACETAMINOPHEN 650 MG/1
650 SUPPOSITORY RECTAL EVERY 6 HOURS PRN
Status: DISCONTINUED | OUTPATIENT
Start: 2020-05-17 | End: 2020-05-20 | Stop reason: HOSPADM

## 2020-05-17 RX ORDER — HALOPERIDOL 1 MG/1
1 TABLET ORAL 4 TIMES DAILY
Status: DISCONTINUED | OUTPATIENT
Start: 2020-05-17 | End: 2020-05-18

## 2020-05-17 RX ORDER — SODIUM CHLORIDE 0.9 % (FLUSH) 0.9 %
10 SYRINGE (ML) INJECTION EVERY 12 HOURS SCHEDULED
Status: DISCONTINUED | OUTPATIENT
Start: 2020-05-17 | End: 2020-05-20 | Stop reason: HOSPADM

## 2020-05-17 RX ORDER — TRAZODONE HYDROCHLORIDE 150 MG/1
150 TABLET ORAL NIGHTLY
Status: DISCONTINUED | OUTPATIENT
Start: 2020-05-17 | End: 2020-05-17

## 2020-05-17 RX ADMIN — SENNOSIDES 8.6 MG: 8.6 TABLET, FILM COATED ORAL at 09:02

## 2020-05-17 RX ADMIN — SODIUM CHLORIDE, PRESERVATIVE FREE 10 ML: 5 INJECTION INTRAVENOUS at 20:28

## 2020-05-17 RX ADMIN — METOPROLOL TARTRATE 25 MG: 25 TABLET, FILM COATED ORAL at 20:28

## 2020-05-17 RX ADMIN — METOPROLOL TARTRATE 25 MG: 25 TABLET, FILM COATED ORAL at 05:09

## 2020-05-17 RX ADMIN — HYDRALAZINE HYDROCHLORIDE 10 MG: 20 INJECTION INTRAMUSCULAR; INTRAVENOUS at 03:51

## 2020-05-17 RX ADMIN — SODIUM CHLORIDE: 9 INJECTION, SOLUTION INTRAVENOUS at 10:05

## 2020-05-17 RX ADMIN — SODIUM CHLORIDE, PRESERVATIVE FREE 10 ML: 5 INJECTION INTRAVENOUS at 09:03

## 2020-05-17 RX ADMIN — HALOPERIDOL 1 MG: 1 TABLET ORAL at 20:27

## 2020-05-17 RX ADMIN — HALOPERIDOL 1 MG: 1 TABLET ORAL at 17:34

## 2020-05-17 RX ADMIN — HALOPERIDOL 1 MG: 1 TABLET ORAL at 09:01

## 2020-05-17 RX ADMIN — SODIUM CHLORIDE: 9 INJECTION, SOLUTION INTRAVENOUS at 00:05

## 2020-05-17 RX ADMIN — HALOPERIDOL 1 MG: 1 TABLET ORAL at 13:23

## 2020-05-17 RX ADMIN — SODIUM CHLORIDE: 9 INJECTION, SOLUTION INTRAVENOUS at 21:26

## 2020-05-17 NOTE — ED PROVIDER NOTES
[address] EMERGENCY DEPT  eMERGENCY dEPARTMENT eNCOUnter      Pt Name: Lea Patterson  MRN: [id number]  [place] 1940  Date of evaluation: 5/16/2020  Provider: Fallon Patel MD    [address] [place] [address]       Chief Complaint   Patient presents with    Shortness of Breath     SOA 88% on O2 concentrator, 90% upon arrival with O2 @ 2L NC.         HISTORY OF PRESENT ILLNESS   (Location/Symptom, Timing/Onset,Context/Setting, Quality, Duration, Modifying Factors, Severity)  Note limiting factors. Lea Patterson is a 78 y.o. female who presents to the emergency department evaluation respiratory distress. 68-year-old female transferred from [facility] with respiratory distress low sat. And decreased responsiveness. She is here with her power of  she states she is unaware the patient had been ill until she got a call from the nursing home today. Staff reports nursing home states patient is a DNR but I do not have that documentation in the power of  is unaware. She states this patient is from [address] [place] [address] and never talks about death. She is had some significant comorbid conditions. In January she had a stent placed in her bile duct. Which is helped that condition. No history of congestive heart failure. The been no reports of fever. She is nonambulatory there is been no known trauma recently. The history is provided by a relative and medical records. NursingNotes were reviewed. REVIEW OF SYSTEMS    (2-9 systems for level 4, 10 or more for level 5)     Review of Systems   Unable to perform ROS: Patient unresponsive       A complete review of systems was performed and is negative except as noted above in the HPI.        PAST MEDICAL HISTORY     Past Medical History:   Diagnosis Date    Bipolar disorder, current episode mixed ([address]     Contracture, right knee     Dementia [facility]     Depression     Hyperlipidemia     Major depressive disorder     Osteoarthritis          SURGICAL HISTORY       Past Surgical History:   Procedure Laterality Date    CHOLECYSTECTOMY           CURRENT MEDICATIONS       Previous Medications    AMLODIPINE (NORVASC) 2.5 MG TABLET    Take 1 tablet by mouth daily    FENTANYL (DURAGESIC) 25 MCG/HR    APPLY 1 PATCH TO SKIN EVERY 72 HOURS, REMOVE EXISTING PATCH    FLUDROCORTISONE (FLORINEF) 0.1 MG TABLET    Take 0.1 mg by mouth daily    HALOPERIDOL (HALDOL) 1 MG TABLET    Take 1 mg by mouth 4 times daily    LISINOPRIL (PRINIVIL;ZESTRIL) 40 MG TABLET    Take 1 tablet by mouth daily    MAGNESIUM HYDROXIDE (MILK OF MAGNESIA) 400 MG/5ML SUSPENSION    Take by mouth daily as needed for Constipation    MEGESTROL (MEGACE) 40 MG TABLET    Take 40 mg by mouth daily    MENTHOL, TOPICAL ANALGESIC, 4 % GEL    Apply topically Indications: apply to lower back    MIRTAZAPINE (REMERON SOL-TAB) 15 MG DISINTEGRATING TABLET    Take 0.5 tablets by mouth nightly    POLYETHYLENE GLYCOL (GLYCOLAX) PACKET    Take 17 g by mouth daily as needed for Constipation    POTASSIUM CHLORIDE (KLOR-CON M) 10 MEQ EXTENDED RELEASE TABLET    Take 10 mEq by mouth 2 times daily    RISPERIDONE (RISPERDAL) 0.25 MG TABLET    Take 1 tablet by mouth 2 times daily    SENNA (SENOKOT) 8.6 MG TABLET    Take 1 tablet by mouth daily    TRAZODONE (DESYREL) 150 MG TABLET    Take 150 mg by mouth nightly       ALLERGIES     Sulfa antibiotics and Ultram [tramadol]    FAMILY HISTORY     History reviewed. No pertinent family history. SOCIAL HISTORY       Social History     Socioeconomic History    Marital status:       Spouse name: None    Number of children: None    Years of education: None    Highest education level: None   Occupational History    None   Social Needs    Financial resource strain: None    Food insecurity     Worry: None     Inability: None    Transportation needs     Medical: None     Non-medical: None   Tobacco Use    Smoking status: Never Smoker    Smokeless tobacco: Never Used   Substance and Sexual Activity    Alcohol use: No     Alcohol/week: 0.0 standard drinks    Drug use: No    Sexual activity: None   Lifestyle    Physical activity     Days per week: None     Minutes per session: None    Stress: None   Relationships    Social connections     Talks on phone: None     Gets together: None     Attends Faith service: None     Active member of club or organization: None     Attends meetings of clubs or organizations: None     Relationship status: None    Intimate partner violence     Fear of current or ex partner: None     Emotionally abused: None     Physically abused: None     Forced sexual activity: None   Other Topics Concern    None   Social History Narrative    None       SCREENINGS    Junie Coma Scale  Eye Opening: Spontaneous  Best Verbal Response: Incomprehensible speech  Best Motor Response: Localizes pain  Junie Coma Scale Score: 11        PHYSICAL EXAM    (up to 7 for level 4, 8 or more for level 5)     ED Triage Vitals   BP Temp Temp Source Pulse Resp SpO2 Height Weight   05/16/20 2007 05/16/20 2007 05/16/20 2007 05/16/20 2007 05/16/20 2007 05/16/20 2007 05/16/20 2004 05/16/20 2004   (!) 115/100 98.2 °F (36.8 °C) Axillary 113 30 (!) 82 % 5' (1.524 m) 100 lb (45.4 kg)       Physical Exam  Vitals signs and nursing note reviewed. Constitutional:       Appearance: She is well-developed. She is ill-appearing. HENT:      Head: Normocephalic and atraumatic. Comments: I do not see any obvious head injury     Right Ear: External ear normal.      Left Ear: External ear normal.      Mouth/Throat:      Pharynx: Oropharynx is clear. Eyes:      Pupils: Pupils are equal, round, and reactive to light. Neck:      Musculoskeletal: Neck rigidity present. Cardiovascular:      Rate and Rhythm: Regular rhythm. Tachycardia present. Pulses: Normal pulses. Heart sounds: Normal heart sounds.    Pulmonary:      Effort: Pulmonary CBC WITH AUTO DIFFERENTIAL - Abnormal; Notable for the following components:    WBC 15.5 (*)     Hematocrit 47.6 (*)     MCHC 31.3 (*)     RDW 15.9 (*)     Neutrophils % 80.7 (*)     Lymphocytes % 12.5 (*)     Neutrophils Absolute 12.5 (*)     All other components within normal limits   COMPREHENSIVE METABOLIC PANEL W/ REFLEX TO MG FOR LOW K - Abnormal; Notable for the following components:    Sodium 150 (*)     Chloride 113 (*)     Glucose 310 (*)     BUN 40 (*)     GFR Non- 60 (*)     Alb 3.3 (*)     Alkaline Phosphatase 121 (*)     All other components within normal limits   LACTIC ACID, PLASMA - Abnormal; Notable for the following components:    Lactic Acid 3.0 (*)     All other components within normal limits    Narrative:     CALL  [place]  [facility] tel. ,  Chemistry results called to and read back by Charley Bryant in ED, 05/16/2020  20:43, by TANI   TROPONIN - Abnormal; Notable for the following components:    Troponin 0.06 (*)     All other components within normal limits   APTT - Abnormal; Notable for the following components:    aPTT 20.0 (*)     All other components within normal limits   HEMOGLOBIN A1C - Abnormal; Notable for the following components:    Hemoglobin A1C 6.8 (*)     All other components within normal limits   CULTURE, BLOOD 1   CULTURE, BLOOD 2   POTASSIUM, WHOLE BLOOD   AMMONIA   BRAIN NATRIURETIC PEPTIDE   PROTIME-INR   APTT   APTT       All other labs were within normal range or not returned as of this dictation.     EMERGENCY DEPARTMENT COURSE and DIFFERENTIALDIAGNOSIS/MDM:   Vitals:    Vitals:    05/16/20 2100 05/16/20 2136 05/16/20 2200 05/16/20 2300   BP: (!) 178/96  (!) 141/88 (!) 162/103   Pulse: 117  107 95   Resp: (!) 31 26 21 26   Temp:       TempSrc:       SpO2: 94% 93% 93% 93%   Weight:       Height:           MDM  Number of Diagnoses or Management Options  Acute respiratory failure with hypoxia [facility]):   Bilateral pulmonary embolism ([facility]):   DNR (do not resuscitate):

## 2020-05-17 NOTE — ED NOTES
Respiratory at bedside for ABG. Patient's O2 sat dropped to 82% on room air. Per EMS, patient was on O2 concentrator with cannula in mouth, O2 sat around mid-80s. She was given Lasix 40mg IVP en route and O2 up to 90% on 5L.       Cee Newton RN  05/16/20 2010

## 2020-05-17 NOTE — PROGRESS NOTES
BLOOD GAS, ARTERIAL   Status:  Final result    Ref Range & Units 05/16/20 2022   pH, Arterial 7.350 - 7.450 7.500High     pCO2, Arterial 35.0 - 45.0 mmHg 29. 0Low     pO2, Arterial 80.0 - 100.0 mmHg 54. 0Low     HCO3, Arterial 22.0 - 26.0 mmol/L 22.6    Base Excess, Arterial -2.0 - 2.0 mmol/L 0.6    Hemoglobin, Art, Extended 12.0 - 16.0 g/dL 15.7    O2 Sat, Arterial >92 % 90.1    Carboxyhgb, Arterial 0.0 - 5.0 % 2.6    Comment:      0.0-1.5   (Smokers 1.5-5.0)    Methemoglobin, Arterial <1.5 % 1.0    O2 Content, Arterial Not Established mL/dL 19.8    O2 Therapy  Unknown    Resulting Agency  [facility]        Specimen Collected: 05/16/20 2022 Last Resulted: 05/16/20 2023 View Other Order Details        Room Air  RR

## 2020-05-17 NOTE — PROGRESS NOTES
pulmonary emboli. 2. Moderate clot burden. 3. No right heart enlargement. 4. No acute lung disease. Signed by Dr Juan José King on 5/16/2020 9:16 PM      CT Head WO Contrast   Final Result   1. Age-related changes with no acute intracranial abnormality. Signed by Dr Juan José King on 5/16/2020 9:13 PM      XR CHEST PORTABLE   Final Result   1. No acute disease. Signed by Dr Juan José King on 5/16/2020 8:35 PM        Micro:   Blood Culture, Routine   Date Value Ref Range Status   12/31/2019 No growth after 5 days of incubation. Final   , No components found for: LABURINE  Patient Active Problem List    Diagnosis Date Noted    Dementia with behavioral disturbance ([facility]      Priority: High    Psychosis ([facility]      Priority: Medium    Acute pulmonary embolism without acute cor pulmonale ([facility] 05/16/2020       Assessment/plan: Active Problems:    Acute pulmonary embolism without acute cor pulmonale (HCC)  Resolved Problems:    * No resolved hospital problems. *      Plan:   --Acute pulmonary embolism. Patient started on therapeutic IV heparin drip and admitted to the medicine floor. Start gentle IV fluid hydration and supplemental oxygen.   Echocardiogram and serial cardiac enzymes ordered for further evaluation.     -- DVT prophylaxis: Systemic anticoagulation with IV heparin drip.     -- CODE STATUS: DNR  5/17/20  PMH of dementia, HTN, HLD,polypharmacy on duragesic patch etc  ? DNR from NH presented with MS change CT head no acute, ABG showed acute hypoxemic respiratory failure, and hyperventilation with respiratory alkalosis, had dehydration, hypernatremia, CTA bilateral Pes no R ventricle strain , leukocytosis, better, BC -ve TD, CXR -ve , troponin marginal 2 to PE , lactic acidosis 3, started on heparin gtt, check B12 ammonia, TSh, hold CNS suppressants      Thelma Leon MD  Hospitalist Service  5/17/2020  8:27 AM

## 2020-05-17 NOTE — PROGRESS NOTES
Placed patient on 40% venturi mask. Pt's SAT is 93%.  Will continue to monitor and titrate oxygen as needed

## 2020-05-17 NOTE — H&P
Apply topically 2 times daily Indications: apply to lower back     Historical Provider, MD   fludrocortisone (FLORINEF) 0.1 MG tablet Take 0.1 mg by mouth daily    Historical Provider, MD   haloperidol (HALDOL) 1 MG tablet Take 1 mg by mouth 3 times daily     Historical Provider, MD   potassium chloride (KLOR-CON M) 10 MEQ extended release tablet Take 10 mEq by mouth daily     Historical Provider, MD   megestrol (MEGACE) 40 MG tablet Take 40 mg by mouth daily liquid    Historical Provider, MD   magnesium hydroxide (MILK OF MAGNESIA) 400 MG/5ML suspension Take 30 mLs by mouth daily as needed for Constipation     Historical Provider, MD   polyethylene glycol (GLYCOLAX) packet Take 17 g by mouth daily as needed for Constipation    Historical Provider, MD   senna (SENOKOT) 8.6 MG tablet Take 1 tablet by mouth 2 times daily     Historical Provider, MD   traZODone (DESYREL) 150 MG tablet Take 150 mg by mouth nightly    Historical Provider, MD   mirtazapine (REMERON SOL-TAB) 15 MG disintegrating tablet Take 0.5 tablets by mouth nightly 6/28/16   JORDON Hdz   lisinopril (PRINIVIL;ZESTRIL) 40 MG tablet Take 1 tablet by mouth daily 6/28/16   JORDON Hdz   risperiDONE (RISPERDAL) 0.25 MG tablet Take 1 tablet by mouth 2 times daily 6/28/16   JORDON Hdz   amLODIPine (NORVASC) 2.5 MG tablet Take 1 tablet by mouth daily 6/28/16   JORDON Hdz       Allergies:  Sulfa antibiotics and Ultram [tramadol]    Social History:   TOBACCO:   reports that she has never smoked. She has never used smokeless tobacco.  ETOH:   reports no history of alcohol use. Social History     Substance and Sexual Activity   Drug Use No       Family History:   History reviewed. No pertinent family history.         Physical Exam:    Vitals: BP (!) 116/53   Pulse 94   Temp 97.8 °F (36.6 °C) (Temporal)   Resp 27   Ht 5' (1.524 m)   Wt 105 lb 3.2 oz (47.7 kg)   SpO2 94%   BMI 20.55 kg/m²   24HR [facility])  Resolved Problems:    * No resolved hospital problems. *      Plan     --Acute pulmonary embolism. Patient started on therapeutic IV heparin drip and admitted to the medicine floor. Start gentle IV fluid hydration and supplemental oxygen. Echocardiogram and serial cardiac enzymes ordered for further evaluation. -- DVT prophylaxis: Systemic anticoagulation with IV heparin drip. -- CODE STATUS: DNR  Total face-to-face time with this patient, time spent reviewing medical records and in coordination of care with the emergency department physician, nursing staff was 75 minutes.     Signed:  Rosario Munson MD 5/17/2020 7:00 AM  Admitting Hospitalist

## 2020-05-17 NOTE — PROGRESS NOTES
Per nurse at [facility], patient is from [facility] and speaks [place] She said she does not talk much normally but she does communicate. Takes her medications crushed with applesauce. Patient appears lethargic and is alert to stimulation but not speaking.

## 2020-05-18 PROBLEM — Z51.5 PALLIATIVE CARE PATIENT: Status: ACTIVE | Noted: 2020-05-18

## 2020-05-18 LAB
ANION GAP SERPL CALCULATED.3IONS-SCNC: 11 MMOL/L (ref 7–19)
APTT: 65.8 SEC (ref 26–36.2)
APTT: 82.5 SEC (ref 26–36.2)
BILIRUBIN URINE: NEGATIVE
BLOOD, URINE: NEGATIVE
BUN BLDV-MCNC: 26 MG/DL (ref 8–23)
CALCIUM SERPL-MCNC: 8.7 MG/DL (ref 8.8–10.2)
CHLORIDE BLD-SCNC: 117 MMOL/L (ref 98–111)
CLARITY: CLEAR
CO2: 22 MMOL/L (ref 22–29)
COLOR: YELLOW
CREAT SERPL-MCNC: 0.7 MG/DL (ref 0.5–0.9)
EKG P AXIS: 72 DEGREES
EKG P-R INTERVAL: 128 MS
EKG Q-T INTERVAL: 318 MS
EKG QRS DURATION: 70 MS
EKG QTC CALCULATION (BAZETT): 409 MS
EKG T AXIS: 38 DEGREES
GFR NON-AFRICAN AMERICAN: >60
GLUCOSE BLD-MCNC: 139 MG/DL (ref 74–109)
GLUCOSE URINE: NEGATIVE MG/DL
HCT VFR BLD CALC: 43.1 % (ref 37–47)
HEMOGLOBIN: 12.3 G/DL (ref 12–16)
KETONES, URINE: NEGATIVE MG/DL
LACTIC ACID: 2.4 MMOL/L (ref 0.5–1.9)
LEUKOCYTE ESTERASE, URINE: NEGATIVE
MAGNESIUM: 2.2 MG/DL (ref 1.6–2.4)
MCH RBC QN AUTO: 29.1 PG (ref 27–31)
MCHC RBC AUTO-ENTMCNC: 28.5 G/DL (ref 33–37)
MCV RBC AUTO: 101.9 FL (ref 81–99)
MRSA CULTURE ONLY: NORMAL
NITRITE, URINE: NEGATIVE
PDW BLD-RTO: 15.9 % (ref 11.5–14.5)
PH UA: 6.5 (ref 5–8)
PLATELET # BLD: 147 K/UL (ref 130–400)
PMV BLD AUTO: 10.6 FL (ref 9.4–12.3)
POTASSIUM REFLEX MAGNESIUM: 3.6 MMOL/L (ref 3.5–5)
PROTEIN UA: NEGATIVE MG/DL
RBC # BLD: 4.23 M/UL (ref 4.2–5.4)
SODIUM BLD-SCNC: 150 MMOL/L (ref 136–145)
SPECIFIC GRAVITY UA: 1.03 (ref 1–1.03)
UROBILINOGEN, URINE: 1 E.U./DL
WBC # BLD: 16.2 K/UL (ref 4.8–10.8)

## 2020-05-18 PROCEDURE — 2700000000 HC OXYGEN THERAPY PER DAY

## 2020-05-18 PROCEDURE — 6360000002 HC RX W HCPCS: Performed by: HOSPITALIST

## 2020-05-18 PROCEDURE — 2580000003 HC RX 258: Performed by: HOSPITALIST

## 2020-05-18 PROCEDURE — 83605 ASSAY OF LACTIC ACID: CPT

## 2020-05-18 PROCEDURE — 6370000000 HC RX 637 (ALT 250 FOR IP): Performed by: HOSPITALIST

## 2020-05-18 PROCEDURE — 81003 URINALYSIS AUTO W/O SCOPE: CPT

## 2020-05-18 PROCEDURE — 36415 COLL VENOUS BLD VENIPUNCTURE: CPT

## 2020-05-18 PROCEDURE — 51798 US URINE CAPACITY MEASURE: CPT

## 2020-05-18 PROCEDURE — 93010 ELECTROCARDIOGRAM REPORT: CPT | Performed by: INTERNAL MEDICINE

## 2020-05-18 PROCEDURE — 85027 COMPLETE CBC AUTOMATED: CPT

## 2020-05-18 PROCEDURE — 51701 INSERT BLADDER CATHETER: CPT

## 2020-05-18 PROCEDURE — 85730 THROMBOPLASTIN TIME PARTIAL: CPT

## 2020-05-18 PROCEDURE — 83735 ASSAY OF MAGNESIUM: CPT

## 2020-05-18 PROCEDURE — 80048 BASIC METABOLIC PNL TOTAL CA: CPT

## 2020-05-18 PROCEDURE — 2000000000 HC ICU R&B

## 2020-05-18 RX ORDER — DEXTROSE AND SODIUM CHLORIDE 5; .45 G/100ML; G/100ML
INJECTION, SOLUTION INTRAVENOUS CONTINUOUS
Status: DISCONTINUED | OUTPATIENT
Start: 2020-05-18 | End: 2020-05-19

## 2020-05-18 RX ORDER — HALOPERIDOL 1 MG/1
1 TABLET ORAL 2 TIMES DAILY
Status: DISCONTINUED | OUTPATIENT
Start: 2020-05-18 | End: 2020-05-20 | Stop reason: HOSPADM

## 2020-05-18 RX ADMIN — SENNOSIDES 8.6 MG: 8.6 TABLET, FILM COATED ORAL at 14:49

## 2020-05-18 RX ADMIN — SODIUM CHLORIDE, PRESERVATIVE FREE 10 ML: 5 INJECTION INTRAVENOUS at 09:58

## 2020-05-18 RX ADMIN — HEPARIN SODIUM 12 UNITS/KG/HR: 10000 INJECTION, SOLUTION INTRAVENOUS at 02:35

## 2020-05-18 RX ADMIN — DEXTROSE AND SODIUM CHLORIDE: 5; 450 INJECTION, SOLUTION INTRAVENOUS at 22:17

## 2020-05-18 RX ADMIN — APIXABAN 10 MG: 5 TABLET, FILM COATED ORAL at 14:40

## 2020-05-18 RX ADMIN — DEXTROSE AND SODIUM CHLORIDE: 5; 450 INJECTION, SOLUTION INTRAVENOUS at 09:58

## 2020-05-18 RX ADMIN — SODIUM CHLORIDE, PRESERVATIVE FREE 10 ML: 5 INJECTION INTRAVENOUS at 21:00

## 2020-05-18 RX ADMIN — APIXABAN 10 MG: 5 TABLET, FILM COATED ORAL at 23:48

## 2020-05-18 RX ADMIN — SODIUM CHLORIDE: 9 INJECTION, SOLUTION INTRAVENOUS at 04:24

## 2020-05-18 ASSESSMENT — PAIN SCALES - GENERAL: PAINLEVEL_OUTOF10: 0

## 2020-05-18 NOTE — PLAN OF CARE
Nutrition Problem: Altered nutrition-related lab values  Intervention: Food and/or Nutrient Delivery: Modify current diet, Start ONS  Nutritional Goals: Blood glucose levels 150 or below

## 2020-05-18 NOTE — PROGRESS NOTES
Nutrition Assessment    Type and Reason for Visit: Initial, Positive Nutrition Screen    Nutrition Recommendations: Modify diet to Dysphagia II, Carb 4. Glucerna TID. Nutrition Assessment: Pt is nutritionally compromised AEB endocrine dysfunction and abnormal lab values. Pt is at risk for further compromise d/t h/o c/s difficulty and inadequate oral intake. Will modify diet to Dysphagia II, Carb 4 and start Glucerna TID.      Nutrition Risk Level: High    Nutrient Needs:  · Estimated Daily Total Kcal: 2079-5660 kcals/day  · Estimated Daily Protein (g): 49-98 g/PRO/day  · Estimated Daily Total Fluid (ml/day): 8325-3894 mL/day    Nutrition Diagnosis:   · Problem: Altered nutrition-related lab values  · Etiology: related to Endocrine dysfunction     Signs and symptoms:  as evidenced by Lab values    Objective Information:  · Current Nutrition Therapies:  · Oral Diet Orders: Cardiac   · Anthropometric Measures:  · Ht: 5' (152.4 cm)   · Current Body Wt: 108 lb (49 kg)  · BMI Classification: BMI 18.5 - 24.9 Normal Weight    Nutrition Interventions:   Modify current diet, Start ONS  Continued Inpatient Monitoring    Nutrition Evaluation:   · Evaluation: Goals set   · Goals: Blood glucose levels 150 or below    · Monitoring: Nutrition Progression, Meal Intake, Supplement Intake, Diet Tolerance, Weight, Pertinent Labs, Chewing/Swallowing      Electronically signed by Easton Tavera MS, RD, LD on 5/18/20 at 4:48 PM CDT    Contact Number: [phone]

## 2020-05-18 NOTE — PROGRESS NOTES
Patient would not awaken fully for safe p.o. trials. When questioned about accepting p.o. trials she shook her head no. Nursing reports similar behavior. Will return when patient will participate in full evaluation.         Electronically Signed By:  Eliezer Lilly M.S., CCC-SLP  5/18/2020,9:12 AM.

## 2020-05-18 NOTE — CARE COORDINATION
5/18/20: Pt is from [facility].   [facility]  P: [phone]  F: [phone]  Electronically signed by DOUG Jaffe on 5/18/2020 at 2:17 PM

## 2020-05-19 LAB
HCT VFR BLD CALC: 33.3 % (ref 37–47)
HEMOGLOBIN: 9.9 G/DL (ref 12–16)
MCH RBC QN AUTO: 29.1 PG (ref 27–31)
MCHC RBC AUTO-ENTMCNC: 29.7 G/DL (ref 33–37)
MCV RBC AUTO: 97.9 FL (ref 81–99)
PDW BLD-RTO: 15.5 % (ref 11.5–14.5)
PLATELET # BLD: 134 K/UL (ref 130–400)
PMV BLD AUTO: 10.3 FL (ref 9.4–12.3)
RBC # BLD: 3.4 M/UL (ref 4.2–5.4)
WBC # BLD: 11.5 K/UL (ref 4.8–10.8)

## 2020-05-19 PROCEDURE — 92610 EVALUATE SWALLOWING FUNCTION: CPT

## 2020-05-19 PROCEDURE — 85027 COMPLETE CBC AUTOMATED: CPT

## 2020-05-19 PROCEDURE — 1210000000 HC MED SURG R&B

## 2020-05-19 PROCEDURE — 2700000000 HC OXYGEN THERAPY PER DAY

## 2020-05-19 PROCEDURE — 6370000000 HC RX 637 (ALT 250 FOR IP): Performed by: INTERNAL MEDICINE

## 2020-05-19 PROCEDURE — 6370000000 HC RX 637 (ALT 250 FOR IP): Performed by: HOSPITALIST

## 2020-05-19 PROCEDURE — 2580000003 HC RX 258: Performed by: INTERNAL MEDICINE

## 2020-05-19 PROCEDURE — 36415 COLL VENOUS BLD VENIPUNCTURE: CPT

## 2020-05-19 RX ORDER — DEXTROSE MONOHYDRATE 50 MG/ML
INJECTION, SOLUTION INTRAVENOUS CONTINUOUS
Status: DISCONTINUED | OUTPATIENT
Start: 2020-05-19 | End: 2020-05-20 | Stop reason: HOSPADM

## 2020-05-19 RX ADMIN — HALOPERIDOL 1 MG: 1 TABLET ORAL at 21:14

## 2020-05-19 RX ADMIN — METOPROLOL TARTRATE 25 MG: 25 TABLET, FILM COATED ORAL at 08:11

## 2020-05-19 RX ADMIN — DEXTROSE MONOHYDRATE: 50 INJECTION, SOLUTION INTRAVENOUS at 10:24

## 2020-05-19 RX ADMIN — HALOPERIDOL 1 MG: 1 TABLET ORAL at 08:11

## 2020-05-19 RX ADMIN — APIXABAN 10 MG: 5 TABLET, FILM COATED ORAL at 08:10

## 2020-05-19 RX ADMIN — APIXABAN 10 MG: 5 TABLET, FILM COATED ORAL at 21:14

## 2020-05-19 ASSESSMENT — PAIN SCALES - GENERAL
PAINLEVEL_OUTOF10: 0
PAINLEVEL_OUTOF10: 0

## 2020-05-19 NOTE — PROGRESS NOTES
Hospitalist Progress Note    Patient:  Irina Lobo  YOB: 1940  Date of Service: 5/19/2020  MRN: [id number]   Acct: [de-identified]   Primary Care Physician: Carlitos Lockhart MD  Advance Directive: [facility]  Admit Date: 5/16/2020       Hospital Day: 3  Referring Provider: Mary Boss DO    Patient Seen, Chart, Consults, Notes, Labs, Radiology studies reviewed. Subjective:  Irina Lobo is a 78 y.o. female  whom we are following for pulmonary embolism and hypernatremia. Hemodynamics are stable. She is being evaluated by speech therapy this morning. At this point, I feel she is clinically stable for transfer out of the ICU. She remains hypernatremic. I will change her IV fluids to D5W.     Allergies:  Sulfa antibiotics and Ultram [tramadol]    Medicines:  Current Facility-Administered Medications   Medication Dose Route Frequency Provider Last Rate Last Dose    dextrose 5 % solution   Intravenous Continuous Mary Boss DO        apixaban (ELIQUIS) tablet 10 mg  10 mg Oral BID Howell MD Colby   10 mg at 05/19/20 0810    [START ON 5/25/2020] apixaban (ELIQUIS) tablet 5 mg  5 mg Oral BID Howell MD Colby        haloperidol (HALDOL) tablet 1 mg  1 mg Oral BID Howell MD Colby   1 mg at 05/19/20 6646    senna (SENOKOT) tablet 8.6 mg  1 tablet Oral Daily Will MD Ken   Stopped at 05/19/20 6194    acetaminophen (TYLENOL) tablet 650 mg  650 mg Oral Q6H PRN Will MD Ken        Or   [facility] acetaminophen (TYLENOL) suppository 650 mg  650 mg Rectal Q6H PRN Will MD Ken        polyethylene glycol (GLYCOLAX) packet 17 g  17 g Oral Daily PRN Will MD Ken        promethazine (PHENERGAN) tablet 12.5 mg  12.5 mg Oral Q6H PRN Will MD Ken        Or    ondansetron (ZOFRAN) injection 4 mg  4 mg Intravenous Q6H PRN Will MD Ken        hydrALAZINE (APRESOLINE) injection 10 mg  10 mg Intravenous Q6H PRN Will MD Ken   10 mg at 05/17/20 file     Active member of club or organization: Not on file     Attends meetings of clubs or organizations: Not on file     Relationship status: Not on file    Intimate partner violence     Fear of current or ex partner: Not on file     Emotionally abused: Not on file     Physically abused: Not on file     Forced sexual activity: Not on file   Other Topics Concern    Not on file   Social History Narrative    Not on file         Review of Systems:    Review of Systems   Unable to perform ROS: Mental status change           Objective:  Blood pressure 128/77, pulse 60, temperature 97.8 °F (36.6 °C), resp. rate 17, height 5' (1.524 m), weight 112 lb 14.4 oz (51.2 kg), SpO2 99 %. Intake/Output Summary (Last 24 hours) at 5/19/2020 1002  Last data filed at 5/19/2020 0900  Gross per 24 hour   Intake 1967.5 ml   Output 300 ml   Net 1667.5 ml       Physical Exam  Vitals signs reviewed. Constitutional:       Appearance: She is well-developed. HENT:      Head: Normocephalic and atraumatic. Eyes:      Conjunctiva/sclera: Conjunctivae normal.      Pupils: Pupils are equal, round, and reactive to light. Cardiovascular:      Rate and Rhythm: Normal rate and regular rhythm. Heart sounds: Normal heart sounds. Pulmonary:      Effort: Pulmonary effort is normal.      Breath sounds: Normal breath sounds. Abdominal:      General: Abdomen is flat. Palpations: Abdomen is soft. Musculoskeletal: Normal range of motion. Skin:     General: Skin is warm and dry. Neurological:      General: No focal deficit present.          Labs:  BMP:   Recent Labs     05/16/20 2015 05/16/20 2022 05/17/20 0114 05/18/20  0124   *  --  150* 150*   K 4.0 3.8 3.7 3.6   *  --  106 117*   CO2 22  --  23 22   BUN 40*  --  30* 26*   CREATININE 0.9  --  0.9 0.7   CALCIUM 9.5  --  9.1 8.7*     CBC:   Recent Labs     05/17/20 0114 05/18/20 0124 05/19/20  0326   WBC 13.8* 16.2* 11.5*   HGB 14.6 12.3 9.9*   HCT 48.6* 43.1 33.3*   MCV 94.7 101.9* 97.9    147 134     LIVER PROFILE:   Recent Labs     05/16/20 2015   AST 25   ALT 22   BILITOT 0.5   ALKPHOS 121*     PT/INR:   Recent Labs     05/16/20 2015   PROTIME 14.1   INR 1.09     APTT:   Recent Labs     05/17/20  1903 05/18/20  0124 05/18/20  0657   APTT 90.8* 82.5* 65.8*     BNP:  No results for input(s): BNP in the last 72 hours. Ionized Calcium:No results for input(s): IONCA in the last 72 hours. Magnesium:  Recent Labs     05/17/20  0114 05/18/20  0124   MG 2.2 2.2     Phosphorus:No results for input(s): PHOS in the last 72 hours. HgbA1C:   Recent Labs     05/16/20 2015   LABA1C 6.8*     Hepatic:   Recent Labs     05/16/20 2015   ALKPHOS 121*   ALT 22   AST 25   PROT 7.6   BILITOT 0.5   LABALBU 3.3*     Lactic Acid:   Recent Labs     05/18/20  0124   LACTA 2.4*     Troponin: No results for input(s): CKTOTAL, CKMB, TROPONINT in the last 72 hours. ABGs: No results for input(s): PH, PCO2, PO2, HCO3, O2SAT in the last 72 hours. CRP:  No results for input(s): CRP in the last 72 hours. Sed Rate:  No results for input(s): SEDRATE in the last 72 hours. Cultures:   No results for input(s): CULTURE in the last 72 hours. Recent Labs     05/17/20  0729 05/17/20  0814   BC No Growth to date. Any change in status will be called. --    BLOODCULT2  --  No Growth to date. Any change in status will be called. No results for input(s): CXSURG in the last 72 hours. Radiology reports as per the Radiologist  Radiology: Ct Head Wo Contrast    Result Date: 5/16/2020  CT HEAD WO CONTRAST 5/16/2020 9:10 PM History: Decreased level of consciousness. In order to have a CT radiation dose as low as reasonably achievable Automated Exposure Control was utilized for adjustment of the mA and/or KV according to patient size. DLP in mGycm= 783. Noncontrast head CT compared with 12/31/2019. Atrophy and small vessel disease. No hemorrhage or mass effect. Clear paranasal sinuses.     1.

## 2020-05-19 NOTE — PROGRESS NOTES
Report to Justin Al RN 3rd floor. Pt to transfer to room 312. Pt HR at present 61 sinus, Sats 100% on NC 2L O2. Pt resting with eyes closed. No evidence of pain.  Electronically signed by Rubén Sheppard RN on 5/19/2020 at 2:42 PM

## 2020-05-19 NOTE — PROGRESS NOTES
4 Eyes Skin Assessment    Judithe Collet is being assessed upon: Transfer to New Unit    I agree that I, Mayra Khan, along with Yash Irene (either 2 RN's or 1 LPN and 1 RN) have performed a thorough Head to Toe Skin Assessment on the patient. ALL assessment sites listed below have been assessed. Areas assessed by both nurses:     [x]   Head, Face, and Ears   [x]   Shoulders, Back, and Chest  [x]   Arms, Elbows, and Hands   [x]   Coccyx, Sacrum, and Ischium  [x]   Legs, Feet, and Heels    Does the Patient have Skin Breakdown?  No    Simon Prevention initiated: Yes  Wound Care Orders initiated: No    WO nurse consulted for Pressure Injury (Stage 3,4, Unstageable, DTI, NWPT, and Complex wounds) and New or Established Ostomies: NA        Primary Nurse eSignature: Mayra Khan RN on 5/19/2020 at 3:47 PM      Co-Signer eSignature: {Esignature:[id number]}

## 2020-05-19 NOTE — PROGRESS NOTES
FEED;Small bites/sips;Eat/Feed slowly; Alternate solids and liquids; Remain upright for 30-45 minutes after meals     Treatment/Goals  Timeframe for Short-term Goals: 1x/day for 3 days   Goal 1: Patient will tolerate minced and moist consistency and thin liquids with min S/S penetration/aspiration during PO intake. Goal 2: Patient staff will follow swallow safety recommendations to decrease risk of penetration/aspiration during PO intake. Goal 3: Re-assessment of swallow function for potential diet upgrade. Goal 4: Potential cognitive-linguistic eval     General  Chart Reviewed: Yes  Behavior/Cognition: Patient appeared lethargic as time and trials progressed. O2 Device: Nasal Cannula   Communication Observation: (Min verbalizations were noted. What patient verbalized, decreased volume of speech and decreased labial movements were observed.)  Follows Directions: Simple, at times, with cues/prompts. Patient Positioning: Upright in bed  Consistencies Administered: Ice chips;Dysphagia Pureed (Dysphagia I); Regular solid; Thin - cup     Assessed patient's swallowing function with the following observations noted:     Oral Phase  Mastication: Ice chips;Regular solid (Patient exhibited slow oral prep with primarily vertical jaw movement noted at the front of the mouth during ice chip trials and regular solid consistency trials presented by SLP.)  Suspected Premature Bolus Loss: Ice chips;Puree;Regular solid; Thin - cup (Oral transit of ice chip trials, puree consistency trials, and regular solid consistency trials-all presented by SLP-varied from 1-3 seconds in length. Patient exhibited inconsistently fast oral transit of thin H2O trials presented via cup by SLP.)  Oral Phase - Comment: No puree consistency residue was noted post swallows. Min regular solid consistency residue was observed post swallows; residue cleared from the mouth with additional dry swallows.      Pharyngeal Phase  Suspected Swallow Delay: Ice

## 2020-05-20 VITALS
SYSTOLIC BLOOD PRESSURE: 138 MMHG | HEIGHT: 60 IN | HEART RATE: 65 BPM | BODY MASS INDEX: 23.6 KG/M2 | DIASTOLIC BLOOD PRESSURE: 63 MMHG | OXYGEN SATURATION: 97 % | WEIGHT: 120.2 LBS | TEMPERATURE: 96.7 F | RESPIRATION RATE: 16 BRPM

## 2020-05-20 LAB
ANION GAP SERPL CALCULATED.3IONS-SCNC: 11 MMOL/L (ref 7–19)
BUN BLDV-MCNC: 8 MG/DL (ref 8–23)
CALCIUM SERPL-MCNC: 8.1 MG/DL (ref 8.8–10.2)
CHLORIDE BLD-SCNC: 106 MMOL/L (ref 98–111)
CO2: 21 MMOL/L (ref 22–29)
CREAT SERPL-MCNC: 0.5 MG/DL (ref 0.5–0.9)
GFR NON-AFRICAN AMERICAN: >60
GLUCOSE BLD-MCNC: 112 MG/DL (ref 74–109)
HCT VFR BLD CALC: 34.7 % (ref 37–47)
HEMOGLOBIN: 11 G/DL (ref 12–16)
LACTIC ACID: 1.9 MMOL/L (ref 0.5–1.9)
MCH RBC QN AUTO: 29.1 PG (ref 27–31)
MCHC RBC AUTO-ENTMCNC: 31.7 G/DL (ref 33–37)
MCV RBC AUTO: 91.8 FL (ref 81–99)
PDW BLD-RTO: 15 % (ref 11.5–14.5)
PLATELET # BLD: 162 K/UL (ref 130–400)
PMV BLD AUTO: 10.5 FL (ref 9.4–12.3)
POTASSIUM SERPL-SCNC: 3.3 MMOL/L (ref 3.5–5)
RBC # BLD: 3.78 M/UL (ref 4.2–5.4)
SARS-COV-2, NAAT: NOT DETECTED
SODIUM BLD-SCNC: 138 MMOL/L (ref 136–145)
WBC # BLD: 11.8 K/UL (ref 4.8–10.8)

## 2020-05-20 PROCEDURE — U0002 COVID-19 LAB TEST NON-CDC: HCPCS

## 2020-05-20 PROCEDURE — 85027 COMPLETE CBC AUTOMATED: CPT

## 2020-05-20 PROCEDURE — 6370000000 HC RX 637 (ALT 250 FOR IP): Performed by: INTERNAL MEDICINE

## 2020-05-20 PROCEDURE — 2580000003 HC RX 258: Performed by: INTERNAL MEDICINE

## 2020-05-20 PROCEDURE — 2700000000 HC OXYGEN THERAPY PER DAY

## 2020-05-20 PROCEDURE — 36415 COLL VENOUS BLD VENIPUNCTURE: CPT

## 2020-05-20 PROCEDURE — 83605 ASSAY OF LACTIC ACID: CPT

## 2020-05-20 PROCEDURE — 92526 ORAL FUNCTION THERAPY: CPT

## 2020-05-20 PROCEDURE — 94761 N-INVAS EAR/PLS OXIMETRY MLT: CPT

## 2020-05-20 PROCEDURE — 80048 BASIC METABOLIC PNL TOTAL CA: CPT

## 2020-05-20 RX ORDER — POTASSIUM CHLORIDE 7.45 MG/ML
10 INJECTION INTRAVENOUS PRN
Status: DISCONTINUED | OUTPATIENT
Start: 2020-05-20 | End: 2020-05-20 | Stop reason: HOSPADM

## 2020-05-20 RX ORDER — MAGNESIUM SULFATE 1 G/100ML
1 INJECTION INTRAVENOUS PRN
Status: DISCONTINUED | OUTPATIENT
Start: 2020-05-20 | End: 2020-05-20 | Stop reason: HOSPADM

## 2020-05-20 RX ORDER — POTASSIUM CHLORIDE 20 MEQ/1
40 TABLET, EXTENDED RELEASE ORAL PRN
Status: DISCONTINUED | OUTPATIENT
Start: 2020-05-20 | End: 2020-05-20 | Stop reason: HOSPADM

## 2020-05-20 RX ADMIN — SENNOSIDES 8.6 MG: 8.6 TABLET, FILM COATED ORAL at 07:48

## 2020-05-20 RX ADMIN — POTASSIUM CHLORIDE 40 MEQ: 20 TABLET, EXTENDED RELEASE ORAL at 09:09

## 2020-05-20 RX ADMIN — HALOPERIDOL 1 MG: 1 TABLET ORAL at 07:49

## 2020-05-20 RX ADMIN — METOPROLOL TARTRATE 25 MG: 25 TABLET, FILM COATED ORAL at 07:49

## 2020-05-20 RX ADMIN — APIXABAN 10 MG: 5 TABLET, FILM COATED ORAL at 07:48

## 2020-05-20 RX ADMIN — SODIUM CHLORIDE, PRESERVATIVE FREE 10 ML: 5 INJECTION INTRAVENOUS at 07:49

## 2020-05-20 NOTE — PLAN OF CARE
Problem: Falls - Risk of:  Goal: Will remain free from falls  Description: Will remain free from falls  Outcome: Ongoing  Goal: Absence of physical injury  Description: Absence of physical injury  Outcome: Ongoing     Problem: Nutrition  Goal: Optimal nutrition therapy  Description: Nutrition Problem: Altered nutrition-related lab values  Intervention: Food and/or Nutrient Delivery: Modify current diet, Start ONS  Nutritional Goals: Blood glucose levels 150 or below     Outcome: Ongoing   Electronically signed by Shashank Desouza RN on 5/20/2020 at 3:25 AM

## 2020-05-20 NOTE — PROGRESS NOTES
Nutrition Assessment    Type and Reason for Visit: Reassess    Nutrition Recommendations: continue current POC    Nutrition Assessment: Pt remains nutritionally compromised d/t variable po intake   Pt asleep and will not wake up for sitting to eat lunch. Malnutrition Assessment:  · Malnutrition Status: No malnutrition  · Context: Acute illness or injury  · Findings of the 6 clinical characteristics of malnutrition (Minimum of 2 out of 6 clinical characteristics is required to make the diagnosis of moderate or severe Protein Calorie Malnutrition based on AND/ASPEN Guidelines):  1. Energy Intake-Less than or equal to 50% of estimated energy requirement,      2. Weight Loss-No significant weight loss,    3. Fat Loss-No significant subcutaneous fat loss,    4. Muscle Loss-Unable to assess,    5. Fluid Accumulation-Mild fluid accumulation, Extremities  6.   Strength-Not measured    Nutrition Risk Level: High    Nutrient Needs:  · Estimated Daily Total Kcal: 8725-2261 kcals/day  · Estimated Daily Protein (g): 49-98 g/PRO/day  · Estimated Daily Total Fluid (ml/day): 3048-0822 mL/day     Nutrition Diagnosis:   · Problem: Altered nutrition-related lab values  · Etiology: related to Early satiety     Signs and symptoms:  as evidenced by Intake 0-25%, Intake 25-50%, Swallow study results    Objective Information:  · Nutrition-Focused Physical Findings:    · Wound Type: None  · Current Nutrition Therapies:  · Oral Diet Orders: Carb Control 4 Carbs/Meal, Dysphagia Minced and Moist (Dysphagia 2)   · Oral Diet intake: 1-25%, 26-50%  · Oral Nutrition Supplement (ONS) Orders: Diabetic Oral Supplement  · ONS intake: 1-25%, 26-50%     · Anthropometric Measures:  · Ht: 5' (152.4 cm)   · Current Body Wt: 120 lb 3 oz (54.5 kg)  · Admission Body Wt: 108 lb (49 kg)  · Ideal Body Wt: 100 lb (45.4 kg), % Ideal Body    · BMI Classification: BMI 18.5 - 24.9 Normal Weight    Nutrition Interventions:   Continue current diet, Continue

## 2020-05-20 NOTE — PROGRESS NOTES
05/20/20 0748    acetaminophen (TYLENOL) tablet 650 mg  650 mg Oral Q6H PRN Edger Staff, DO        Or    acetaminophen (TYLENOL) suppository 650 mg  650 mg Rectal Q6H PRN Edger Staff, DO        polyethylene glycol [facility]) packet 17 g  17 g Oral Daily PRN Edger Staff, DO        promethazine (PHENERGAN) tablet 12.5 mg  12.5 mg Oral Q6H PRN Edger Staff, DO        Or    ondansetron [facility]) injection 4 mg  4 mg Intravenous Q6H PRN Edger Staff, DO        hydrALAZINE (APRESOLINE) injection 10 mg  10 mg Intravenous Q6H PRN Edger Staff, DO   10 mg at 05/17/20 0351    metoprolol tartrate (LOPRESSOR) tablet 25 mg  25 mg Oral BID Edger Staff, DO   25 mg at 05/20/20 4592    sodium chloride flush 0.9 % injection 10 mL  10 mL Intravenous 2 times per day Edger Staff, DO   10 mL at 05/20/20 0607    sodium chloride flush 0.9 % injection 10 mL  10 mL Intravenous PRN Edger Staff, DO        haloperidol lactate (HALDOL) injection 2 mg  2 mg Intramuscular Q6H PRN Edger Staff, DO            Labs:     Recent Labs     05/18/20  0124 05/19/20  0326 05/20/20  0509   WBC 16.2* 11.5* 11.8*   RBC 4.23 3.40* 3.78*   HGB 12.3 9.9* 11.0*   HCT 43.1 33.3* 34.7*   .9* 97.9 91.8   MCH 29.1 29.1 29.1   MCHC 28.5* 29.7* 31.7*    134 162     Recent Labs     05/18/20  0124 05/20/20  0509   * 138   K 3.6 3.3*   ANIONGAP 11 11   * 106   CO2 22 21*   BUN 26* 8   CREATININE 0.7 0.5   GLUCOSE 139* 112*   CALCIUM 8.7* 8.1*     Recent Labs     05/18/20  0124   MG 2.2     No results for input(s): AST, ALT, ALB, BILITOT, ALKPHOS, ALB in the last 72 hours. ABGs:No results for input(s): PH, PO2, PCO2, HCO3, BE, O2SAT in the last 72 hours. Troponin T: No results for input(s): TROPONINI in the last 72 hours. INR: No results for input(s): INR in the last 72 hours.   Lactic Acid:   Recent Labs     05/18/20  0124   LACTA 2.4*       Objective:   Vitals:

## 2020-05-21 ENCOUNTER — CARE COORDINATION (OUTPATIENT)
Dept: CASE MANAGEMENT | Age: 80
End: 2020-05-21

## 2020-05-21 ENCOUNTER — TELEPHONE (OUTPATIENT)
Dept: PRIMARY CARE CLINIC | Age: 80
End: 2020-05-21

## 2020-05-22 LAB
BLOOD CULTURE, ROUTINE: NORMAL
BLOOD CULTURE, ROUTINE: NORMAL
CULTURE, BLOOD 2: NORMAL
CULTURE, BLOOD 2: NORMAL

## 2020-06-01 RX ORDER — HYDROCODONE BITARTRATE AND ACETAMINOPHEN 5; 325 MG/1; MG/1
TABLET ORAL
Qty: 120 TABLET | Refills: 0 | Status: SHIPPED | OUTPATIENT
Start: 2020-06-01 | End: 2020-07-30

## 2020-06-10 RX ORDER — FENTANYL 25 UG/H
PATCH TRANSDERMAL
Qty: 10 PATCH | Refills: 0 | Status: SHIPPED | OUTPATIENT
Start: 2020-06-10 | End: 2020-07-06

## 2020-07-06 PROCEDURE — 99285 EMERGENCY DEPT VISIT HI MDM: CPT

## 2020-07-06 RX ORDER — FENTANYL 25 UG/H
PATCH TRANSDERMAL
Qty: 10 PATCH | Refills: 0 | Status: SHIPPED | OUTPATIENT
Start: 2020-07-06 | End: 2020-08-12

## 2020-07-07 ENCOUNTER — APPOINTMENT (OUTPATIENT)
Dept: GENERAL RADIOLOGY | Facility: HOSPITAL | Age: 80
End: 2020-07-07

## 2020-07-07 ENCOUNTER — APPOINTMENT (OUTPATIENT)
Dept: CT IMAGING | Facility: HOSPITAL | Age: 80
End: 2020-07-07

## 2020-07-07 ENCOUNTER — HOSPITAL ENCOUNTER (INPATIENT)
Facility: HOSPITAL | Age: 80
LOS: 3 days | Discharge: NURSING, REHAB OR LONG-TERM CARE | End: 2020-07-10
Attending: INTERNAL MEDICINE | Admitting: INTERNAL MEDICINE

## 2020-07-07 DIAGNOSIS — J18.9 PNEUMONIA OF LEFT LOWER LOBE DUE TO INFECTIOUS ORGANISM: ICD-10-CM

## 2020-07-07 DIAGNOSIS — G89.29 CHRONIC LOW BACK PAIN, UNSPECIFIED BACK PAIN LATERALITY, UNSPECIFIED WHETHER SCIATICA PRESENT: ICD-10-CM

## 2020-07-07 DIAGNOSIS — M54.50 CHRONIC LOW BACK PAIN, UNSPECIFIED BACK PAIN LATERALITY, UNSPECIFIED WHETHER SCIATICA PRESENT: ICD-10-CM

## 2020-07-07 DIAGNOSIS — R13.12 OROPHARYNGEAL DYSPHAGIA: Primary | ICD-10-CM

## 2020-07-07 PROBLEM — G30.9 ALZHEIMER DISEASE (HCC): Status: ACTIVE | Noted: 2020-07-07

## 2020-07-07 PROBLEM — A41.9 SEPSIS (HCC): Status: ACTIVE | Noted: 2020-07-07

## 2020-07-07 PROBLEM — I10 HYPERTENSION: Status: ACTIVE | Noted: 2020-07-07

## 2020-07-07 PROBLEM — M54.9 CHRONIC BACK PAIN: Status: ACTIVE | Noted: 2020-07-07

## 2020-07-07 PROBLEM — F02.80 ALZHEIMER DISEASE (HCC): Status: ACTIVE | Noted: 2020-07-07

## 2020-07-07 PROBLEM — F31.9 BIPOLAR 1 DISORDER (HCC): Status: ACTIVE | Noted: 2020-07-07

## 2020-07-07 LAB
ALBUMIN SERPL-MCNC: 3.6 G/DL (ref 3.5–5.2)
ALBUMIN/GLOB SERPL: 1 G/DL
ALP SERPL-CCNC: 111 U/L (ref 39–117)
ALT SERPL W P-5'-P-CCNC: 26 U/L (ref 1–33)
ANION GAP SERPL CALCULATED.3IONS-SCNC: 20 MMOL/L (ref 5–15)
ANION GAP SERPL CALCULATED.3IONS-SCNC: 21 MMOL/L (ref 5–15)
ARTERIAL PATENCY WRIST A: POSITIVE
AST SERPL-CCNC: 31 U/L (ref 1–32)
ATMOSPHERIC PRESS: 750 MMHG
BACTERIA BLD CULT: ABNORMAL
BACTERIA UR QL AUTO: ABNORMAL /HPF
BASE EXCESS BLDA CALC-SCNC: -6.7 MMOL/L (ref 0–2)
BASOPHILS # BLD AUTO: 0.09 10*3/MM3 (ref 0–0.2)
BASOPHILS NFR BLD AUTO: 0.8 % (ref 0–1.5)
BDY SITE: ABNORMAL
BILIRUB SERPL-MCNC: 0.8 MG/DL (ref 0–1.2)
BILIRUB UR QL STRIP: ABNORMAL
BODY TEMPERATURE: 37 C
BOTTLE TYPE: ABNORMAL
BUN SERPL-MCNC: 24 MG/DL (ref 8–23)
BUN SERPL-MCNC: 24 MG/DL (ref 8–23)
BUN/CREAT SERPL: 23.8 (ref 7–25)
BUN/CREAT SERPL: 34.3 (ref 7–25)
CALCIUM SPEC-SCNC: 9.2 MG/DL (ref 8.6–10.5)
CALCIUM SPEC-SCNC: 9.7 MG/DL (ref 8.6–10.5)
CHLORIDE SERPL-SCNC: 105 MMOL/L (ref 98–107)
CHLORIDE SERPL-SCNC: 107 MMOL/L (ref 98–107)
CLARITY UR: ABNORMAL
CO2 SERPL-SCNC: 18 MMOL/L (ref 22–29)
CO2 SERPL-SCNC: 19 MMOL/L (ref 22–29)
COLOR UR: ABNORMAL
CREAT SERPL-MCNC: 0.7 MG/DL (ref 0.57–1)
CREAT SERPL-MCNC: 1.01 MG/DL (ref 0.57–1)
D DIMER PPP FEU-MCNC: 0.73 MG/L (FEU) (ref 0–0.5)
D-LACTATE SERPL-SCNC: 7.2 MMOL/L (ref 0.5–2)
D-LACTATE SERPL-SCNC: 8.7 MMOL/L (ref 0.5–2)
DEPRECATED RDW RBC AUTO: 49.9 FL (ref 37–54)
DEPRECATED RDW RBC AUTO: 51.5 FL (ref 37–54)
EOSINOPHIL # BLD AUTO: 0.06 10*3/MM3 (ref 0–0.4)
EOSINOPHIL NFR BLD AUTO: 0.5 % (ref 0.3–6.2)
ERYTHROCYTE [DISTWIDTH] IN BLOOD BY AUTOMATED COUNT: 14.8 % (ref 12.3–15.4)
ERYTHROCYTE [DISTWIDTH] IN BLOOD BY AUTOMATED COUNT: 14.9 % (ref 12.3–15.4)
FINE GRAN CASTS URNS QL MICRO: ABNORMAL /LPF
GAS FLOW AIRWAY: 12 LPM
GFR SERPL CREATININE-BSD FRML MDRD: 52 ML/MIN/1.73
GFR SERPL CREATININE-BSD FRML MDRD: 80 ML/MIN/1.73
GLOBULIN UR ELPH-MCNC: 3.7 GM/DL
GLUCOSE SERPL-MCNC: 184 MG/DL (ref 65–99)
GLUCOSE SERPL-MCNC: 215 MG/DL (ref 65–99)
GLUCOSE UR STRIP-MCNC: NEGATIVE MG/DL
HCO3 BLDA-SCNC: 16.1 MMOL/L (ref 20–26)
HCT VFR BLD AUTO: 41 % (ref 34–46.6)
HCT VFR BLD AUTO: 45.5 % (ref 34–46.6)
HGB BLD-MCNC: 13.1 G/DL (ref 12–15.9)
HGB BLD-MCNC: 14.5 G/DL (ref 12–15.9)
HGB UR QL STRIP.AUTO: ABNORMAL
HOLD SPECIMEN: NORMAL
HYALINE CASTS UR QL AUTO: ABNORMAL /LPF
IMM GRANULOCYTES # BLD AUTO: 0.07 10*3/MM3 (ref 0–0.05)
IMM GRANULOCYTES NFR BLD AUTO: 0.6 % (ref 0–0.5)
KETONES UR QL STRIP: ABNORMAL
LACTATE HOLD SPECIMEN: NORMAL
LEUKOCYTE ESTERASE UR QL STRIP.AUTO: ABNORMAL
LIPASE SERPL-CCNC: 14 U/L (ref 13–60)
LYMPHOCYTES # BLD AUTO: 1.69 10*3/MM3 (ref 0.7–3.1)
LYMPHOCYTES # BLD MANUAL: 0.66 10*3/MM3 (ref 0.7–3.1)
LYMPHOCYTES NFR BLD AUTO: 14.3 % (ref 19.6–45.3)
LYMPHOCYTES NFR BLD MANUAL: 3 % (ref 5–12)
LYMPHOCYTES NFR BLD MANUAL: 5 % (ref 19.6–45.3)
Lab: ABNORMAL
MCH RBC QN AUTO: 29.2 PG (ref 26.6–33)
MCH RBC QN AUTO: 29.8 PG (ref 26.6–33)
MCHC RBC AUTO-ENTMCNC: 31.9 G/DL (ref 31.5–35.7)
MCHC RBC AUTO-ENTMCNC: 32 G/DL (ref 31.5–35.7)
MCV RBC AUTO: 91.5 FL (ref 79–97)
MCV RBC AUTO: 93.2 FL (ref 79–97)
MODALITY: ABNORMAL
MONOCYTES # BLD AUTO: 0.32 10*3/MM3 (ref 0.1–0.9)
MONOCYTES # BLD AUTO: 0.4 10*3/MM3 (ref 0.1–0.9)
MONOCYTES NFR BLD AUTO: 2.7 % (ref 5–12)
MUCOUS THREADS URNS QL MICRO: ABNORMAL /HPF
MYELOCYTES NFR BLD MANUAL: 1 % (ref 0–0)
NEUTROPHILS # BLD AUTO: 11.98 10*3/MM3 (ref 1.7–7)
NEUTROPHILS NFR BLD AUTO: 81.1 % (ref 42.7–76)
NEUTROPHILS NFR BLD AUTO: 9.62 10*3/MM3 (ref 1.7–7)
NEUTROPHILS NFR BLD MANUAL: 78 % (ref 42.7–76)
NEUTS BAND NFR BLD MANUAL: 13 % (ref 0–5)
NITRITE UR QL STRIP: NEGATIVE
NRBC BLD AUTO-RTO: 0 /100 WBC (ref 0–0.2)
NRBC SPEC MANUAL: 1 /100 WBC (ref 0–0.2)
NT-PROBNP SERPL-MCNC: 403.1 PG/ML (ref 0–1800)
PCO2 BLDA: 25.6 MM HG (ref 35–45)
PCO2 TEMP ADJ BLD: 25.6 MM HG (ref 35–45)
PH BLDA: 7.41 PH UNITS (ref 7.35–7.45)
PH UR STRIP.AUTO: 6 [PH] (ref 5–8)
PH, TEMP CORRECTED: 7.41 PH UNITS (ref 7.35–7.45)
PLAT MORPH BLD: NORMAL
PLATELET # BLD AUTO: 187 10*3/MM3 (ref 140–450)
PLATELET # BLD AUTO: 208 10*3/MM3 (ref 140–450)
PMV BLD AUTO: 9.3 FL (ref 6–12)
PMV BLD AUTO: 9.7 FL (ref 6–12)
PO2 BLDA: 147 MM HG (ref 83–108)
PO2 TEMP ADJ BLD: 147 MM HG (ref 83–108)
POTASSIUM SERPL-SCNC: 3.9 MMOL/L (ref 3.5–5.2)
POTASSIUM SERPL-SCNC: 4.1 MMOL/L (ref 3.5–5.2)
PROCALCITONIN SERPL-MCNC: 0.24 NG/ML (ref 0–0.25)
PROT SERPL-MCNC: 7.3 G/DL (ref 6–8.5)
PROT UR QL STRIP: ABNORMAL
RBC # BLD AUTO: 4.4 10*6/MM3 (ref 3.77–5.28)
RBC # BLD AUTO: 4.97 10*6/MM3 (ref 3.77–5.28)
RBC # UR: ABNORMAL /HPF
RBC MORPH BLD: NORMAL
REF LAB TEST METHOD: ABNORMAL
SAO2 % BLDCOA: 99.8 % (ref 94–99)
SARS-COV-2 RDRP RESP QL NAA+PROBE: NOT DETECTED
SODIUM SERPL-SCNC: 144 MMOL/L (ref 136–145)
SODIUM SERPL-SCNC: 146 MMOL/L (ref 136–145)
SP GR UR STRIP: 1.03 (ref 1–1.03)
SQUAMOUS #/AREA URNS HPF: ABNORMAL /HPF
TRANS CELLS #/AREA URNS HPF: ABNORMAL /HPF
TROPONIN T SERPL-MCNC: 0.09 NG/ML (ref 0–0.03)
TROPONIN T SERPL-MCNC: 0.12 NG/ML (ref 0–0.03)
TROPONIN T SERPL-MCNC: 0.17 NG/ML (ref 0–0.03)
UROBILINOGEN UR QL STRIP: ABNORMAL
VENTILATOR MODE: ABNORMAL
WBC # BLD AUTO: 11.85 10*3/MM3 (ref 3.4–10.8)
WBC # BLD AUTO: 13.17 10*3/MM3 (ref 3.4–10.8)
WBC MORPH BLD: NORMAL
WBC UR QL AUTO: ABNORMAL /HPF
WHOLE BLOOD HOLD SPECIMEN: NORMAL
WHOLE BLOOD HOLD SPECIMEN: NORMAL

## 2020-07-07 PROCEDURE — 84484 ASSAY OF TROPONIN QUANT: CPT | Performed by: INTERNAL MEDICINE

## 2020-07-07 PROCEDURE — 25010000002 PIPERACILLIN SOD-TAZOBACTAM PER 1 G: Performed by: FAMILY MEDICINE

## 2020-07-07 PROCEDURE — 0 IOPAMIDOL PER 1 ML: Performed by: INTERNAL MEDICINE

## 2020-07-07 PROCEDURE — 85379 FIBRIN DEGRADATION QUANT: CPT | Performed by: INTERNAL MEDICINE

## 2020-07-07 PROCEDURE — 85025 COMPLETE CBC W/AUTO DIFF WBC: CPT | Performed by: INTERNAL MEDICINE

## 2020-07-07 PROCEDURE — 87150 DNA/RNA AMPLIFIED PROBE: CPT | Performed by: INTERNAL MEDICINE

## 2020-07-07 PROCEDURE — 87077 CULTURE AEROBIC IDENTIFY: CPT | Performed by: INTERNAL MEDICINE

## 2020-07-07 PROCEDURE — 87147 CULTURE TYPE IMMUNOLOGIC: CPT | Performed by: INTERNAL MEDICINE

## 2020-07-07 PROCEDURE — 93010 ELECTROCARDIOGRAM REPORT: CPT | Performed by: INTERNAL MEDICINE

## 2020-07-07 PROCEDURE — 71275 CT ANGIOGRAPHY CHEST: CPT

## 2020-07-07 PROCEDURE — 85007 BL SMEAR W/DIFF WBC COUNT: CPT | Performed by: FAMILY MEDICINE

## 2020-07-07 PROCEDURE — 25010000002 PIPERACILLIN SOD-TAZOBACTAM PER 1 G: Performed by: INTERNAL MEDICINE

## 2020-07-07 PROCEDURE — 80053 COMPREHEN METABOLIC PANEL: CPT | Performed by: INTERNAL MEDICINE

## 2020-07-07 PROCEDURE — 82803 BLOOD GASES ANY COMBINATION: CPT

## 2020-07-07 PROCEDURE — 93005 ELECTROCARDIOGRAM TRACING: CPT | Performed by: INTERNAL MEDICINE

## 2020-07-07 PROCEDURE — 92610 EVALUATE SWALLOWING FUNCTION: CPT

## 2020-07-07 PROCEDURE — P9612 CATHETERIZE FOR URINE SPEC: HCPCS

## 2020-07-07 PROCEDURE — 87186 SC STD MICRODIL/AGAR DIL: CPT | Performed by: INTERNAL MEDICINE

## 2020-07-07 PROCEDURE — 36600 WITHDRAWAL OF ARTERIAL BLOOD: CPT

## 2020-07-07 PROCEDURE — 84145 PROCALCITONIN (PCT): CPT | Performed by: INTERNAL MEDICINE

## 2020-07-07 PROCEDURE — 93005 ELECTROCARDIOGRAM TRACING: CPT

## 2020-07-07 PROCEDURE — 71045 X-RAY EXAM CHEST 1 VIEW: CPT

## 2020-07-07 PROCEDURE — 81001 URINALYSIS AUTO W/SCOPE: CPT | Performed by: INTERNAL MEDICINE

## 2020-07-07 PROCEDURE — 87040 BLOOD CULTURE FOR BACTERIA: CPT | Performed by: INTERNAL MEDICINE

## 2020-07-07 PROCEDURE — 83690 ASSAY OF LIPASE: CPT | Performed by: INTERNAL MEDICINE

## 2020-07-07 PROCEDURE — 87086 URINE CULTURE/COLONY COUNT: CPT | Performed by: INTERNAL MEDICINE

## 2020-07-07 PROCEDURE — 25010000002 ENOXAPARIN PER 10 MG: Performed by: INTERNAL MEDICINE

## 2020-07-07 PROCEDURE — 87635 SARS-COV-2 COVID-19 AMP PRB: CPT | Performed by: INTERNAL MEDICINE

## 2020-07-07 PROCEDURE — 25010000002 VANCOMYCIN PER 500 MG: Performed by: FAMILY MEDICINE

## 2020-07-07 PROCEDURE — 85025 COMPLETE CBC W/AUTO DIFF WBC: CPT | Performed by: FAMILY MEDICINE

## 2020-07-07 PROCEDURE — 83880 ASSAY OF NATRIURETIC PEPTIDE: CPT | Performed by: INTERNAL MEDICINE

## 2020-07-07 PROCEDURE — 83605 ASSAY OF LACTIC ACID: CPT | Performed by: INTERNAL MEDICINE

## 2020-07-07 RX ORDER — HALOPERIDOL 1 MG/1
1 TABLET ORAL 3 TIMES DAILY
Status: DISCONTINUED | OUTPATIENT
Start: 2020-07-07 | End: 2020-07-08

## 2020-07-07 RX ORDER — POLYETHYLENE GLYCOL 3350 17 G/17G
17 POWDER, FOR SOLUTION ORAL DAILY PRN
COMMUNITY
End: 2020-07-10 | Stop reason: HOSPADM

## 2020-07-07 RX ORDER — SODIUM CHLORIDE 0.9 % (FLUSH) 0.9 %
10 SYRINGE (ML) INJECTION EVERY 12 HOURS SCHEDULED
Status: DISCONTINUED | OUTPATIENT
Start: 2020-07-07 | End: 2020-07-10 | Stop reason: HOSPADM

## 2020-07-07 RX ORDER — LACTULOSE 10 G/15ML
30 SOLUTION ORAL DAILY PRN
COMMUNITY
End: 2020-07-10 | Stop reason: HOSPADM

## 2020-07-07 RX ORDER — SODIUM CHLORIDE 9 MG/ML
75 INJECTION, SOLUTION INTRAVENOUS CONTINUOUS
Status: DISCONTINUED | OUTPATIENT
Start: 2020-07-07 | End: 2020-07-10 | Stop reason: HOSPADM

## 2020-07-07 RX ORDER — RISPERIDONE 0.25 MG/1
0.25 TABLET ORAL 2 TIMES DAILY
Status: DISCONTINUED | OUTPATIENT
Start: 2020-07-07 | End: 2020-07-10 | Stop reason: HOSPADM

## 2020-07-07 RX ORDER — VANCOMYCIN HYDROCHLORIDE 1 G/200ML
1000 INJECTION, SOLUTION INTRAVENOUS EVERY 24 HOURS
Status: DISCONTINUED | OUTPATIENT
Start: 2020-07-07 | End: 2020-07-09

## 2020-07-07 RX ORDER — ACETAMINOPHEN 325 MG/1
650 TABLET ORAL EVERY 4 HOURS PRN
COMMUNITY
End: 2020-10-02 | Stop reason: HOSPADM

## 2020-07-07 RX ORDER — BISACODYL 10 MG
10 SUPPOSITORY, RECTAL RECTAL DAILY PRN
Status: DISCONTINUED | OUTPATIENT
Start: 2020-07-07 | End: 2020-07-10 | Stop reason: HOSPADM

## 2020-07-07 RX ORDER — FLUDROCORTISONE ACETATE 0.1 MG/1
0.1 TABLET ORAL DAILY
Status: DISCONTINUED | OUTPATIENT
Start: 2020-07-07 | End: 2020-07-10 | Stop reason: HOSPADM

## 2020-07-07 RX ORDER — HALOPERIDOL 1 MG/1
1 TABLET ORAL 3 TIMES DAILY
COMMUNITY
End: 2020-07-10 | Stop reason: HOSPADM

## 2020-07-07 RX ORDER — BISACODYL 10 MG
10 SUPPOSITORY, RECTAL RECTAL DAILY PRN
Status: ON HOLD | COMMUNITY
End: 2020-07-10

## 2020-07-07 RX ORDER — ACETAMINOPHEN 325 MG/1
650 TABLET ORAL EVERY 4 HOURS PRN
Status: DISCONTINUED | OUTPATIENT
Start: 2020-07-07 | End: 2020-07-10 | Stop reason: HOSPADM

## 2020-07-07 RX ORDER — HYDROCODONE BITARTRATE AND ACETAMINOPHEN 5; 325 MG/1; MG/1
1 TABLET ORAL 2 TIMES DAILY
Status: DISCONTINUED | OUTPATIENT
Start: 2020-07-07 | End: 2020-07-10 | Stop reason: HOSPADM

## 2020-07-07 RX ORDER — SODIUM CHLORIDE 0.9 % (FLUSH) 0.9 %
10 SYRINGE (ML) INJECTION AS NEEDED
Status: DISCONTINUED | OUTPATIENT
Start: 2020-07-07 | End: 2020-07-10 | Stop reason: HOSPADM

## 2020-07-07 RX ORDER — FENTANYL 25 UG/H
1 PATCH TRANSDERMAL
Status: ON HOLD | COMMUNITY
End: 2020-07-10 | Stop reason: SDUPTHER

## 2020-07-07 RX ORDER — POTASSIUM CHLORIDE 750 MG/1
10 TABLET, EXTENDED RELEASE ORAL DAILY
COMMUNITY
End: 2020-07-10 | Stop reason: HOSPADM

## 2020-07-07 RX ORDER — HYDROCODONE BITARTRATE AND ACETAMINOPHEN 5; 325 MG/1; MG/1
1 TABLET ORAL 2 TIMES DAILY
Status: ON HOLD | COMMUNITY
End: 2020-07-10 | Stop reason: SDUPTHER

## 2020-07-07 RX ORDER — FLUDROCORTISONE ACETATE 0.1 MG/1
0.1 TABLET ORAL DAILY
COMMUNITY
End: 2020-10-02 | Stop reason: HOSPADM

## 2020-07-07 RX ORDER — MEGESTROL ACETATE 40 MG/ML
40 SUSPENSION ORAL DAILY
Status: DISCONTINUED | OUTPATIENT
Start: 2020-07-07 | End: 2020-07-09

## 2020-07-07 RX ORDER — MEGESTROL ACETATE 40 MG/ML
400 SUSPENSION ORAL DAILY
COMMUNITY
End: 2020-10-02 | Stop reason: HOSPADM

## 2020-07-07 RX ORDER — FENTANYL 25 UG/H
1 PATCH TRANSDERMAL
Status: DISCONTINUED | OUTPATIENT
Start: 2020-07-07 | End: 2020-07-10 | Stop reason: HOSPADM

## 2020-07-07 RX ORDER — TRAZODONE HYDROCHLORIDE 150 MG/1
150 TABLET ORAL EVERY EVENING
COMMUNITY
End: 2020-07-10 | Stop reason: HOSPADM

## 2020-07-07 RX ORDER — LACTULOSE 20 G/30ML
20 SOLUTION ORAL DAILY PRN
Status: DISCONTINUED | OUTPATIENT
Start: 2020-07-07 | End: 2020-07-10 | Stop reason: HOSPADM

## 2020-07-07 RX ORDER — RISPERIDONE 0.25 MG/1
0.25 TABLET ORAL 2 TIMES DAILY
COMMUNITY
End: 2020-10-02 | Stop reason: HOSPADM

## 2020-07-07 RX ADMIN — RISPERIDONE 0.25 MG: 0.25 TABLET, FILM COATED ORAL at 09:58

## 2020-07-07 RX ADMIN — IOPAMIDOL 57 ML: 755 INJECTION, SOLUTION INTRAVENOUS at 02:11

## 2020-07-07 RX ADMIN — HALOPERIDOL 1 MG: 1 TABLET ORAL at 16:02

## 2020-07-07 RX ADMIN — SODIUM CHLORIDE 1000 ML: 9 INJECTION, SOLUTION INTRAVENOUS at 01:49

## 2020-07-07 RX ADMIN — FLUDROCORTISONE ACETATE 0.1 MG: 0.1 TABLET ORAL at 09:58

## 2020-07-07 RX ADMIN — MEGESTROL ACETATE 40 MG: 40 SUSPENSION ORAL at 09:58

## 2020-07-07 RX ADMIN — HYDROCODONE BITARTRATE AND ACETAMINOPHEN 1 TABLET: 5; 325 TABLET ORAL at 09:58

## 2020-07-07 RX ADMIN — RISPERIDONE 0.25 MG: 0.25 TABLET, FILM COATED ORAL at 21:31

## 2020-07-07 RX ADMIN — VANCOMYCIN HYDROCHLORIDE 1000 MG: 1 INJECTION, SOLUTION INTRAVENOUS at 22:03

## 2020-07-07 RX ADMIN — SODIUM CHLORIDE 1000 ML: 9 INJECTION, SOLUTION INTRAVENOUS at 12:03

## 2020-07-07 RX ADMIN — SODIUM CHLORIDE 500 ML: 9 INJECTION, SOLUTION INTRAVENOUS at 05:51

## 2020-07-07 RX ADMIN — ENOXAPARIN SODIUM 60 MG: 60 INJECTION SUBCUTANEOUS at 01:52

## 2020-07-07 RX ADMIN — TAZOBACTAM SODIUM AND PIPERACILLIN SODIUM 3.38 G: 375; 3 INJECTION, SOLUTION INTRAVENOUS at 08:20

## 2020-07-07 RX ADMIN — TAZOBACTAM SODIUM AND PIPERACILLIN SODIUM 3.38 G: 375; 3 INJECTION, SOLUTION INTRAVENOUS at 16:02

## 2020-07-07 RX ADMIN — FENTANYL 1 PATCH: 25 PATCH TRANSDERMAL at 08:20

## 2020-07-07 RX ADMIN — PIPERACILLIN SODIUM AND TAZOBACTAM SODIUM 3.38 G: 3; .375 INJECTION, POWDER, LYOPHILIZED, FOR SOLUTION INTRAVENOUS at 01:49

## 2020-07-07 RX ADMIN — TRAZODONE HYDROCHLORIDE 150 MG: 100 TABLET ORAL at 21:31

## 2020-07-07 RX ADMIN — HALOPERIDOL 1 MG: 1 TABLET ORAL at 10:03

## 2020-07-07 RX ADMIN — SODIUM CHLORIDE 75 ML/HR: 9 INJECTION, SOLUTION INTRAVENOUS at 08:21

## 2020-07-07 RX ADMIN — HALOPERIDOL 1 MG: 1 TABLET ORAL at 21:31

## 2020-07-07 RX ADMIN — HYDROCODONE BITARTRATE AND ACETAMINOPHEN 1 TABLET: 5; 325 TABLET ORAL at 21:31

## 2020-07-07 RX ADMIN — TAZOBACTAM SODIUM AND PIPERACILLIN SODIUM 3.38 G: 375; 3 INJECTION, SOLUTION INTRAVENOUS at 23:48

## 2020-07-07 RX ADMIN — APIXABAN 5 MG: 5 TABLET, FILM COATED ORAL at 21:32

## 2020-07-07 RX ADMIN — APIXABAN 5 MG: 5 TABLET, FILM COATED ORAL at 09:58

## 2020-07-07 RX ADMIN — SODIUM CHLORIDE, PRESERVATIVE FREE 10 ML: 5 INJECTION INTRAVENOUS at 21:32

## 2020-07-07 NOTE — PROGRESS NOTES
"  She is an 80-year-old nursing home resident who was admitted earlier today by Dr. Guillaume for sepsis from pneumonia suspected aspiration.  She is currently on Zosyn.  I spoke to her power of  by the name Alisha Cortes.  Directives as outlined below.  I also had chance to discuss case with speech therapy who felt that patient has no gross signs of aspiration right now but may be silent aspirating.  Patient reportedly unable to get to formal dysphagia study due to contractures of lower extremities.  Other alternative is flexible endoscopic examination by speech therapy however family does not want her anyway to have feeding tube.  It was determined by speech therapist that this may not be useful given above directed.  Family wished to continue to treat the infection.  Patient was recently hospitalized in January in [place] for \"cholangitis\"  She also had hospitalization at [facility] in May 2020 due to pulmonary embolism.  She was placed on apixaban.  Current CT scan angiogram of the chest due to elevated d-dimer did not show any evidence of pulmonary embolism but the exam is limited by motion.  D-dimer is elevated but could be related to aspiration pneumonia.  She has lactic acidosis however I did not see any record of hypotension to explain this.  She also has pyuria hematuria and bacteriuria suspected to have an underlying urinary tract infection.  This was a catheterized urine sample.  Culture has been sent and pending.  Surprisingly her procalcitonin is normal at 0.24.  proBNP is normal at 403    She appears hemodynamically stable with a facial mask O2 saturation is 96% on 10 L  Patient kept her eyes closed during the whole examination.  She does not follow commands.  She has spontaneous movement of upper extremities.  I could not see very well through the face shield her eyes besides I can describe it as anicteric and has pink conjunctiva.  She has diminished inspiratory effort, I could not " appreciate any wheezes or crackles  Normocephalic and atraumatic head  No thyromegaly  S1-S2 grossly no murmur, regular heart rate  Soft abdomen, nontender, no gross organomegaly  No cyanosis or clubbing or edema  Contractures of lower extremities  She has a fentanyl patch on right deltoid      Assessment  Aspiration pneumonia  Sepsis secondary to above  Anion gap metabolic acidosis from lactic acidosis.  Likely cause from sepsis  Mildly elevated troponin - her EKG has an undetermined rhythm with marked ST's abnormality.  Will repeat EKG. follow the trend of her troponin.  It could possibly be a type II elevation from sepsis.  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  She had received fluid.  I do not think currently we need to do anything further in this matter (lactic acidosis)  Prognosis is poor  Continue empiric IV antibiotic  Other plans per orders    POA's directive includes:    Treat infection  No permanent feeding tube  dnr and dni  No invasive measure such as heart cath    It took at least 10 minutes of my time to discuss with POA above situation.  Directives as mentioned above.  In addition to this I had a discussion with nurse including speech therapist.  I spent at least total of 35 minutes in face-to-face contact and looping all things together as mentioned above.

## 2020-07-07 NOTE — PLAN OF CARE
Problem: Patient Care Overview  Goal: Plan of Care Review  Outcome: Ongoing (interventions implemented as appropriate)  Flowsheets (Taken 7/7/2020 1406)  Progress: no change  Plan of Care Reviewed With: other (see comments)  Outcome Summary: Pt is ordered a pureed, cardiac diet with nectar thick liquids per ST recommendations. Pt was on a pureed diet at SNF. Family indicates that pt would not want a feeding tube. No indication of weight loss recently. Weight in May was noted at 120#, current weight is 127#. Pt is dependent for meals. She is disoriented. Will send magic cup with lunch and dinner d/t predicted suboptimal intake. Will follow.

## 2020-07-07 NOTE — THERAPY EVALUATION
Acute Care - Speech Language Pathology   Swallow Initial Evaluation [facility]     Patient Name: Zuleyma Ernst  : 1940  MRN: [id number]  Today's Date: 2020               Admit Date: 2020  Clinical bedside swallow evaluation completed.Pudding, honey, nectar, and thin consistencies were presented. Pt was unable to follow commands. She would mumble at times, but no intelligible speech was achieved. When presented with an empty spoon, she pursed her lips and attempted to suck from the spoon. She had little lip opening with most presentations, but did successfully obtain PO via both spoon and straw. No immediate overt s/s of aspiration were observed with any consistency, but she did exhibit congested coughing prior to PO trials. Cannot r/o aspiration with PO. She is likely at increased risk for silent aspiration considering her multiple medical issues. No solids were attempted due to her mental state and decreased oral manipulation of PO trials. Nurse, Emi reported that the family has stated the pt wouldn't want a feeding tube. The pt's current code status is no CPR and limited interventions. She also has leg contractures that would make positioning difficult in a chair. Considering these factors, instrumental swallow evaluation may not be beneficial at this point.     Recommend:  1. A pureed diet and nectar thick liquids.   2. Meds crushed in applesauce or thickened liquids.   3. Frequent oral care.   4. 1:1 assistance with meals.  Madai Pérez CCC-SLP 2020 09:25    Visit Dx:     ICD-10-CM ICD-9-CM   1. Oropharyngeal dysphagia R13.12 787.22     Patient Active Problem List   Diagnosis   • Left lower lobe pneumonia   • Hypertension   • Bipolar 1 disorder (CMS/HCC)   • Chronic back pain   • Alzheimer disease (CMS/HCC)   • Sepsis (CMS/HCC)     Past Medical History:   Diagnosis Date   • Alzheimer disease (CMS/HCC)     Per Nursing home paperwork   • Anxiety disorder     per Nursing home  paperwork   • Arthritis    • Bipolar 1 disorder (CMS/HCC)     Nursing home paperwork   • Chronic back pain     per Nursing home paperwork   • Constipation     Per nursing home paperwork   • Dementia (CMS/HCC)     per nursing home paperwork   • Hyperlipidemia    • Hypertension    • Major depressive disorder     per Nursing home paperwork   • Osteoarthritis     per nursing home paperwork.   • Pulmonary air embolism (CMS/HCC)     Per nursing home paperwork     History reviewed. No pertinent surgical history.     SWALLOW EVALUATION (last 72 hours)      SLP Adult Swallow Evaluation     Row Name 07/07/20 0830                   Rehab Evaluation    Document Type  evaluation  -MB        Subjective Information  -- unintelligible  -MB        Patient Observations  unable to respond  -MB        Patient/Family Observations  No family present  -MB           General Information    Patient Profile Reviewed  yes  -MB        Pertinent History Of Current Problem  Sepsis, LLL pneumonia, dementia, PE, Bipolar disorder, HTN, chronic pain, dysphagia, contractures  -MB        Current Method of Nutrition  pureed;thin liquids  -MB        Precautions/Limitations, Vision  other (see comments) kept eyes closed most of eval  -MB        Precautions/Limitations, Hearing  other (see comments) unknown  -MB        Prior Level of Function-Communication  cognitive-linguistic impairment;other (see comments) dementia  -MB        Prior Level of Function-Swallowing  puree;other (see comments) unsure of liquid consistency at SNF  -MB        Plans/Goals Discussed with  patient;other (see comments) NurseEmi  -MB        Barriers to Rehab  medically complex;previous functional deficit;cognitive status  -MB        Patient's Goals for Discharge  patient could not state  -MB           Pain Assessment    Additional Documentation  Pain Scale: FACES Pre/Post-Treatment (Group)  -MB           Pain Scale: FACES Pre/Post-Treatment    Pain: FACES Scale, Pretreatment   4-->hurts little more  -MB           Oral Motor and Function    Dentition Assessment  other (see comments) difficult to assess, appears to have own teeth  -MB        Secretion Management  other (see comments) difficult to assess, minimal mouth opening  -MB        Mucosal Quality  dry;other (see comments) lips  -MB           Oral Musculature and Cranial Nerve Assessment    Oral Motor General Assessment  unable to assess  -MB           General Eating/Swallowing Observations    Respiratory Support Currently in Use  oxygen mask  -MB        Eating/Swallowing Skills  fed by SLP  -MB        Positioning During Eating  upright in bed  -MB        Utensils Used  spoon;straw  -MB        Consistencies Trialed  thin liquids;nectar/syrup-thick liquids;honey-thick liquids;pudding thick  -MB           Clinical Swallow Eval    Oral Prep Phase  impaired  -MB        Oral Transit  impaired  -MB        Pharyngeal Phase  no overt signs/symptoms of pharyngeal impairment  -MB        Esophageal Phase  unremarkable  -MB        Clinical Swallow Evaluation Summary  Pudding, honey, nectar, and thin consistencies were presented. Pt was unable to follow commands. She would mumble at times, but no intelligible speech was achieved. When presented with an empty spoon, she pursed her lips and attempted to suck from the spoon. She had little lip opening with most presentations, but did successfully obtain PO via both spoon and straw. No immediate overt s/s of aspiration were observed with any consistency, but she did exhibit congested coughing prior to PO trials. Cannot r/o aspiration with PO. She is likely at increased risk for silent aspiration considering her multiple medical issues. No solids were attempted due to her mental state and decreased oral manipulation of PO trials. Nurse, Emi reported that the family has stated the pt wouldn't want a feeding tube. The pt's current code status is no CPR and limited interventions. She also has leg  contractures that would make positioning difficult in a chair.   -MB           Oral Prep Concerns    Oral Prep Concerns  reduced lip opening  -MB        Reduced Lip Opening  all consistencies  -MB           Oral Transit Concerns    Oral Transit Concerns  increased oral transit time  -MB        Increased Oral Transit Time  all consistencies  -MB           Clinical Impression    SLP Swallowing Diagnosis  mod-severe;oral dysfunction;suspected pharyngeal dysfunction  -MB        Functional Impact  risk of aspiration/pneumonia;risk of malnutrition;risk of dehydration  -MB        Rehab Potential/Prognosis, Swallowing  re-evaluate goals as necessary  -MB        Swallow Criteria for Skilled Therapeutic Interventions Met  demonstrates skilled criteria  -MB           Recommendations    Therapy Frequency (Swallow)  PRN  -MB        Predicted Duration Therapy Intervention (Days)  until discharge  -MB        SLP Diet Recommendation  puree;nectar thick liquids  -MB        Recommended Precautions and Strategies  upright posture during/after eating;small bites of food and sips of liquid;alternate between small bites of food and sips of liquid;other (see comments) 1:1 assistance with PO  -MB        SLP Rec. for Method of Medication Administration  meds crushed;with pudding or applesauce;with thick liquids  -MB        Monitor for Signs of Aspiration  yes;gurgly voice;notify SLP if any concerns  -MB        Anticipated Dischage Disposition (SLP)  skilled nursing facility  -MB           Swallow Goals (SLP)    Oral Nutrition/Hydration Goal Selection (SLP)  oral nutrition/hydration, SLP goal 1  -MB           Oral Nutrition/Hydration Goal 1 (SLP)    Oral Nutrition/Hydration Goal 1, SLP  Pt will tolerate least restrictive diet with no overt s/s of aspiration.  -MB        Time Frame (Oral Nutrition/Hydration Goal 1, SLP)  by discharge  -MB        Barriers (Oral Nutrition/Hydration Goal 1, SLP)  n/a  -MB        Progress/Outcomes (Oral  Nutrition/Hydration Goal 1, SLP)  goal ongoing  -MB          User Key  (r) = Recorded By, (t) = Taken By, (c) = Cosigned By    Initials Name Effective Dates    Madai Wynne, CCC-SLP 08/02/16 -           EDUCATION  The patient has been educated in the following areas:   Dysphagia (Swallowing Impairment).    SLP Recommendation and Plan  SLP Swallowing Diagnosis: mod-severe, oral dysfunction, suspected pharyngeal dysfunction  SLP Diet Recommendation: puree, nectar thick liquids  Recommended Precautions and Strategies: upright posture during/after eating, small bites of food and sips of liquid, alternate between small bites of food and sips of liquid, other (see comments)(1:1 assistance with PO)  SLP Rec. for Method of Medication Administration: meds crushed, with pudding or applesauce, with thick liquids     Monitor for Signs of Aspiration: yes, gurgly voice, notify SLP if any concerns     Swallow Criteria for Skilled Therapeutic Interventions Met: demonstrates skilled criteria  Anticipated Dischage Disposition (SLP): skilled nursing facility  Rehab Potential/Prognosis, Swallowing: re-evaluate goals as necessary  Therapy Frequency (Swallow): PRN  Predicted Duration Therapy Intervention (Days): until discharge       Plan of Care Reviewed With: patient, other (see comments)(Nurse, Emi)  Outcome Summary: Clinical bedside swallow evaluation completed.Pudding, honey, nectar, and thin consistencies were presented. Pt was unable to follow commands. She would mumble at times, but no intelligible speech was achieved. When presented with an empty spoon, she pursed her lips and attempted to suck from the spoon. She had little lip opening with most presentations, but did successfully obtain PO via both spoon and straw. No immediate overt s/s of aspiration were observed with any consistency, but she did exhibit congested coughing prior to PO trials. Cannot r/o aspiration with PO. She is likely at increased risk for  silent aspiration considering her multiple medical issues. No solids were attempted due to her mental state and decreased oral manipulation of PO trials. Nurse, Isaiasshey reported that the family has stated the pt wouldn't want a feeding tube. The pt's current code status is no CPR and limited interventions. She also has leg contractures that would make positioning difficult in a chair. Considering these factors, instrumental swallow evaluation may not be beneficial at this point. Recommend a pureed diet and nectar thick liquids. Meds crushed in applesauce or thickened liquids. Frequent oral care. 1:1 assistance with meals.    SLP GOALS     Row Name 07/07/20 0830             Oral Nutrition/Hydration Goal 1 (SLP)    Oral Nutrition/Hydration Goal 1, SLP  Pt will tolerate least restrictive diet with no overt s/s of aspiration.  -MB      Time Frame (Oral Nutrition/Hydration Goal 1, SLP)  by discharge  -MB      Barriers (Oral Nutrition/Hydration Goal 1, SLP)  n/a  -MB      Progress/Outcomes (Oral Nutrition/Hydration Goal 1, SLP)  goal ongoing  -MB        User Key  (r) = Recorded By, (t) = Taken By, (c) = Cosigned By    Initials Name Provider Type    Madai Wynne CCC-SLP Speech and Language Pathologist             Time Calculation:   Time Calculation- SLP     Row Name 07/07/20 0925             Time Calculation- SLP    SLP Start Time  0830  -MB      SLP Stop Time  0925  -MB      SLP Time Calculation (min)  55 min  -MB      SLP Received On  07/07/20  -MB      SLP Goal Re-Cert Due Date  07/17/20  -MB        User Key  (r) = Recorded By, (t) = Taken By, (c) = Cosigned By    Initials Name Provider Type    Madai Wynne CCC-SLP Speech and Language Pathologist          Therapy Charges for Today     Code Description Service Date Service Provider Modifiers Qty    [id number] HC ST EVAL ORAL PHARYNG SWALLOW 4 7/7/2020 Madai Pérez CCC-SLP GN 1               Madai LIRA. JOSE ALFREDO Pérez  7/7/2020

## 2020-07-07 NOTE — PLAN OF CARE
Problem: Patient Care Overview  Goal: Plan of Care Review  Outcome: Ongoing (interventions implemented as appropriate)  Flowsheets (Taken 7/7/2020 0937)  Plan of Care Reviewed With: patient; other (see comments) (NurseEmi)  Outcome Summary: Clinical bedside swallow evaluation completed. Pudding, honey, nectar, and thin consistencies were presented. Pt was unable to follow commands. She would mumble at times, but no intelligible speech was achieved. When presented with an empty spoon, she pursed her lips and attempted to suck from the spoon. She had little lip opening with most presentations, but did successfully obtain PO via both spoon and straw. No immediate overt s/s of aspiration were observed with any consistency, but she did exhibit congested coughing prior to PO trials. Cannot r/o aspiration with PO. She is likely at increased risk for silent aspiration considering her multiple medical issues. No solids were attempted due to her mental state and decreased oral manipulation of PO trials. NurseEmi reported that the family has stated the pt wouldn't want a feeding tube. The pt's current code status is no CPR and limited interventions. She also has leg contractures that would make positioning difficult in a chair. Considering these factors, instrumental swallow evaluation may not be beneficial at this point. Recommend a pureed diet and nectar thick liquids. Meds crushed in applesauce or thickened liquids. Frequent oral care. 1:1 assistance with meals.

## 2020-07-07 NOTE — PROGRESS NOTES
Discharge Planning Assessment  [facility]     Patient Name: Zuleyma Ernst  MRN: [id number]  Today's Date: 7/7/2020    Admit Date: 7/7/2020    Discharge Needs Assessment     Row Name 07/07/20 1025       Living Environment    Lives With  facility resident    Name(s) of Who Lives With Patient  Resides at [facility]    Current Living Arrangements  extended care facility    Primary Care Provided by  other (see comments)    Provides Primary Care For  no one, unable/limited ability to care for self    Able to Return to Prior Arrangements  yes       Resource/Environmental Concerns    Resource/Environmental Concerns  none       Transition Planning    Patient/Family Anticipates Transition to  long term care facility    Patient/Family Anticipated Services at Transition  other (see comments)       Discharge Needs Assessment    Outpatient/Agency/Support Group Needs  other (see comments)    Discharge Facility/Level of Care Needs  nursing facility, intermediate        Discharge Plan     Row Name 07/07/20 1027       Plan    Plan  [facility]    Patient/Family in Agreement with Plan  yes    Plan Comments  Pt is a resident of [facility]. Spoke with Nieves from facility [phone], bed held for pt's return at [facility] [place] level care.         Destination      Coordination has not been started for this encounter.      Durable Medical Equipment      Coordination has not been started for this encounter.      Dialysis/Infusion      Coordination has not been started for this encounter.      Home Medical Care      Coordination has not been started for this encounter.      Therapy      Coordination has not been started for this encounter.      Community Resources      Coordination has not been started for this encounter.          Demographic Summary    No documentation.       Functional Status    No documentation.       Psychosocial    No documentation.       Abuse/Neglect    No documentation.       Legal     No documentation.       Substance Abuse    No documentation.       Patient Forms    No documentation.           JUAN FRANCISCO Morales

## 2020-07-07 NOTE — PLAN OF CARE
Problem: Patient Care Overview  Goal: Plan of Care Review  Outcome: Ongoing (interventions implemented as appropriate)  Flow sheets (Taken 7/7/2020 1700)  Progress: no change  Plan of Care Reviewed With: patient  Outcome Summary: Patient arrived to room 443 from ED via stretcher. Patient on 10 liters open oxygen, HR elevated, respirations in the 40's. Patient screened sepsis positive in the ED prior to arrival to room 443. Patient disorientated x 4 history of dementia and Alzheimer's. Awaiting providers orders. Will continue to monitor.

## 2020-07-07 NOTE — H&P
[facility] Medicine Services  HISTORY AND PHYSICAL    Date of Admission: 7/7/2020  Primary Care Physician: Slade Barroso MD    Subjective     Chief Complaint: Hypoxemia    History of Present Illness  80 year old female SNF resident with PMH of Dementia, PE, Bipolar disorder, HTN, chronic pain, constipation, referred from SNF due to low oxygen level. She had advanced dementia with dysphagia and contractures and is not able to provide much history. Appears dyspneic at rest, breathing at over 25 breaths per minute, requiring oxygen by face mask. CT PE shows infiltrates in left base. SNF documents provide DNR directive.     Review of Systems   Unable to perform ROS: Dementia      Past Medical History:   Past Medical History:   Diagnosis Date   • Alzheimer disease (CMS/HCC)     Per Nursing home paperwork   • Anxiety disorder     per Nursing home paperwork   • Arthritis    • Bipolar 1 disorder (CMS/HCC)     Nursing home paperwork   • Chronic back pain     per Nursing home paperwork   • Constipation     Per nursing home paperwork   • Dementia (CMS/HCC)     per nursing home paperwork   • Hyperlipidemia    • Hypertension    • Major depressive disorder     per Nursing home paperwork   • Osteoarthritis     per nursing home paperwork.   • Pulmonary air embolism (CMS/HCC)     Per nursing home paperwork     Past Surgical History:History reviewed. No pertinent surgical history.     Social History:  reports that she drank alcohol. She reports that she has current or past drug history.    Family History: Patient is unable to provide.    Allergies:  Allergies   Allergen Reactions   • Sulfa Antibiotics Unknown - High Severity   • Ultram [Tramadol Hcl] Unknown - High Severity     Medications:  Prior to Admission medications    Medication Sig Start Date End Date Taking? Authorizing Provider   acetaminophen (TYLENOL) 325 MG tablet Take 650 mg by mouth Every 4 (Four) Hours As Needed for Mild Pain  .   Yes Jose Harp MD   apixaban (ELIQUIS) 5 MG tablet tablet Take 5 mg by mouth 2 (Two) Times a Day.   Yes Jose Harp MD   Benzethonium Chloride (MicroKlenz) 0.1 % liquid Apply  topically.   Yes Jose Harp MD   bisacodyl ([facility] Bisacodyl) 10 MG suppository Insert 10 mg into the rectum Daily As Needed for Constipation.   Yes Jose Harp MD   fentaNYL (DURAGESIC) 25 MCG/HR patch Place 1 patch on the skin as directed by provider Every 72 (Seventy-Two) Hours.   Yes Jose Hrap MD   fludrocortisone 0.1 MG tablet Take 0.1 mg by mouth Daily.   Yes Jose Harp MD   haloperidol (HALDOL) 1 MG tablet Take 1 mg by mouth 3 (Three) Times a Day.   Yes Jose Harp MD   HYDROcodone-acetaminophen (NORCO) 5-325 MG per tablet Take 1 tablet by mouth 2 (two) times a day.   Yes Jose Harp MD   lactulose (CHRONULAC) 10 GM/15ML solution Take 30 mL by mouth Daily.   Yes Jose Harp MD   magnesium hydroxide (MILK OF MAGNESIA) 400 MG/5ML suspension Take 30 mL by mouth Daily As Needed for Constipation.   Yes Jose Harp MD   megestrol (MEGACE) 40 MG tablet Take 40 mg by mouth Daily.   Yes Jose Harp MD   Menthol, Topical Analgesic, 4 % gel Apply  topically 2 (Two) Times a Day.   Yes Jose Harp MD   polyethylene glycol (MIRALAX) 17 g packet Take 17 g by mouth Daily.   Yes Jose Harp MD   potassium chloride (K-DUR,KLOR-CON) 10 MEQ CR tablet Take 10 mEq by mouth Daily.   Yes Jose Harp MD   risperiDONE (risperDAL) 0.25 MG tablet Take 0.25 mg by mouth 2 (Two) Times a Day.   Yes Jose Harp MD   SENNOSIDES PO Take 1 tablet/day by mouth As Needed.   Yes Jose Harp MD   traZODone (DESYREL) 150 MG tablet Take 150 mg by mouth Every Night.   Yes Jose Harp MD     Objective     Vital Signs: /91 (BP Location: Right arm, Patient Position: Lying)   Pulse (!) 124   Temp 99 °F  "(37.2 °C) (Axillary)   Resp (!) 40   Ht 160 cm (62.99\")   Wt 57.6 kg (127 lb)   SpO2 93%   Breastfeeding No   BMI 22.50 kg/m²   Physical Exam   Constitutional:   Elderly debilitated female, poorly responsive, disoriented, contracted.    HENT:   Head: Normocephalic and atraumatic.   Right Ear: External ear normal.   Left Ear: External ear normal.   Eyes: Pupils are equal, round, and reactive to light. Right eye exhibits no discharge. Left eye exhibits no discharge.   Neck: Normal range of motion. Neck supple. No JVD present. No thyromegaly present.   Cardiovascular:   No murmur heard.  Sinus tachycardia   Pulmonary/Chest:   Breathing is shallow and fast. Rales at both bases, minimal supraclavicular retractions   Abdominal: Soft. She exhibits no distension and no mass. There is no tenderness. There is no rebound and no guarding.   Musculoskeletal: She exhibits no tenderness or deformity.   Neurological:   Maintains eyes closed and does not follow commands. Lower extremities are contracted. Unable to fully evaluate   Skin: Skin is warm. Capillary refill takes less than 2 seconds. No rash noted. No erythema.     Results Reviewed:  Lab Results (last 24 hours)     Procedure Component Value Units Date/Time    Urinalysis, Microscopic Only - Urine, Catheter [[id number]]  (Abnormal) Collected:  07/07/20 0011    Specimen:  Urine, Catheter Updated:  07/07/20 0142     RBC, UA 21-30 /HPF      WBC, UA Too Numerous to Count /HPF      Bacteria, UA 3+ /HPF      Squamous Epithelial Cells, UA 0-2 /HPF      Transitional Epithelial Cells, UA 0-2 /HPF      Hyaline Casts, UA 3-6 /LPF      Fine Granular Casts, UA 3-6 /LPF      Mucus, UA Small/1+ /HPF      Methodology Manual Light Microscopy    Urine Culture - Urine, Urine, Catheter [[id number]] Collected:  07/07/20 0011    Specimen:  Urine, Catheter Updated:  07/07/20 0142    Blood Culture - Blood, Arm, Left [[id number]] Collected:  07/07/20 0122    Specimen:  Blood from Arm, Left " Updated:  07/07/20 0132    Urinalysis With Culture If Indicated - Urine, Catheter [[id number]]  (Abnormal) Collected:  07/07/20 0011    Specimen:  Urine, Catheter Updated:  07/07/20 0131     Color, UA Dark Yellow     Appearance, UA Cloudy     pH, UA 6.0     Specific Gravity, UA 1.027     Glucose, UA Negative     Ketones, UA Trace     Bilirubin, UA Small (1+)     Blood, UA Moderate (2+)     Protein,  mg/dL (2+)     Leuk Esterase, UA Moderate (2+)     Nitrite, UA Negative     Urobilinogen, UA 1.0 E.U./dL    [place] Draw [[id number]] Collected:  07/07/20 0020    Specimen:  Blood Updated:  07/07/20 0130    Narrative:       The following orders were created for panel order [place] Draw.  Procedure                               Abnormality         Status                     ---------                               -----------         ------                     Light Blue Top[[id number]]                                   Final result               Green Top (Gel)[[id number]]                                  Final result               Lavender Top[[id number]]                                     Final result               Red Top[[id number]]                                          Final result               [place] Blood Culture Randell...[[id number]]                      Final result               Gray Top - Ice[[id number]]                                   Final result                 Please view results for these tests on the individual orders.    Light Blue Top [[id number]] Collected:  07/07/20 0021    Specimen:  Blood Updated:  07/07/20 0130     Extra Tube hold for add-on     Comment: Auto resulted       Green Top (Gel) [[id number]] Collected:  07/07/20 0021    Specimen:  Blood Updated:  07/07/20 0130     Extra Tube Hold for add-ons.     Comment: Auto resulted.       Lavender Top [[id number]] Collected:  07/07/20 0021    Specimen:  Blood Updated:  07/07/20 0130     Extra Tube hold for add-on     Comment: Auto resulted       Red  Top [[id number]] Collected:  07/07/20 0021    Specimen:  Blood Updated:  07/07/20 0130     Extra Tube Hold for add-ons.     Comment: Auto resulted.       Gray Top - Ice [[id number]] Collected:  07/07/20 0021    Specimen:  Blood Updated:  07/07/20 0130     Extra Tube Hold for add-ons.     Comment: Auto resulted.       [place] Blood Culture Bottle Set [[id number]] Collected:  07/07/20 0020    Specimen:  Blood from Arm, Right Updated:  07/07/20 0130     Extra Tube Hold for add-ons.     Comment: Auto resulted.       COVID PRE-OP / PRE-PROCEDURE SCREENING ORDER (NO ISOLATION) - Swab, Nasopharynx [[id number]] Collected:  07/07/20 0045    Specimen:  Swab from Nasopharynx Updated:  07/07/20 0117    Narrative:       The following orders were created for panel order COVID PRE-OP / PRE-PROCEDURE SCREENING ORDER (NO ISOLATION) - Swab, Nasopharynx.  Procedure                               Abnormality         Status                     ---------                               -----------         ------                     COVID-19, ABBOTT IN-HOUS...[[id number]]  Normal              Final result                 Please view results for these tests on the individual orders.    COVID-19, ABBOTT IN-HOUSE,NP Swab (NO TRANSPORT MEDIA) 2 HR TAT - Swab, Nasopharynx [[id number]]  (Normal) Collected:  07/07/20 0045    Specimen:  Swab from Nasopharynx Updated:  07/07/20 0117     COVID19 Not Detected    Narrative:       Fact sheet for providers: https://www.fda.gov/media/252095/download     Fact sheet for patients: https://www.fda.gov/media/362829/download    Amy Urine - Urine, Catheter [[id number]] Collected:  07/07/20 0011    Specimen:  Urine, Catheter Updated:  07/07/20 0115     Extra Tube Hold for add-ons.     Comment: Auto resulted.       Procalcitonin [[id number]]  (Normal) Collected:  07/07/20 0021    Specimen:  Blood Updated:  07/07/20 0101     Procalcitonin 0.24 ng/mL     Narrative:       As a Marker for Sepsis (Non-Neonates):   1. <0.5  "ng/mL represents a low risk of severe sepsis and/or septic shock.  1. >2 ng/mL represents a high risk of severe sepsis and/or septic shock.    As a Marker for Lower Respiratory Tract Infections that require antibiotic therapy:  PCT on Admission     Antibiotic Therapy             6-12 Hrs later  > 0.5                Strongly Recommended            >0.25 - <0.5         Recommended  0.1 - 0.25           Discouraged                   Remeasure/reassess PCT  <0.1                 Strongly Discouraged          Remeasure/reassess PCT      As 28 day mortality risk marker: \"Change in Procalcitonin Result\" (> 80 % or <=80 %) if Day 0 (or Day 1) and Day 4 values are available. Refer to http://www.Okeopct-calculator.com/   Change in PCT <=80 %   A decrease of PCT levels below or equal to 80 % defines a positive change in PCT test result representing a higher risk for 28-day all-cause mortality of patients diagnosed with severe sepsis or septic shock.  Change in PCT > 80 %   A decrease of PCT levels of more than 80 % defines a negative change in PCT result representing a lower risk for 28-day all-cause mortality of patients diagnosed with severe sepsis or septic shock.                Results may be falsely decreased if patient taking Biotin.     Comprehensive Metabolic Panel [[id number]]  (Abnormal) Collected:  07/07/20 0021    Specimen:  Blood Updated:  07/07/20 0056     Glucose 184 mg/dL      BUN 24 mg/dL      Creatinine 0.70 mg/dL      Sodium 144 mmol/L      Potassium 4.1 mmol/L      Comment: Specimen hemolyzed.  Results may be affected.        Chloride 105 mmol/L      CO2 19.0 mmol/L      Calcium 9.7 mg/dL      Total Protein 7.3 g/dL      Albumin 3.60 g/dL      ALT (SGPT) 26 U/L      AST (SGOT) 31 U/L      Comment: Specimen hemolyzed.  Results may be affected.        Alkaline Phosphatase 111 U/L      Total Bilirubin 0.8 mg/dL      eGFR Non African Amer 80 mL/min/1.73      Globulin 3.7 gm/dL      A/G Ratio 1.0 g/dL      " BUN/Creatinine Ratio 34.3     Anion Gap 20.0 mmol/L     Narrative:       GFR Normal >60  Chronic Kidney Disease <60  Kidney Failure <15      Troponin [[id number]]  (Abnormal) Collected:  07/07/20 0021    Specimen:  Blood Updated:  07/07/20 0054     Troponin T 0.093 ng/mL     Narrative:       Troponin T Reference Range:  <= 0.03 ng/mL-   Negative for AMI  >0.03 ng/mL-     Abnormal for myocardial necrosis.  Clinicians would have to utilize clinical acumen, EKG, Troponin and serial changes to determine if it is an Acute Myocardial Infarction or myocardial injury due to an underlying chronic condition.       Results may be falsely decreased if patient taking Biotin.      BNP [[id number]]  (Normal) Collected:  07/07/20 0021    Specimen:  Blood Updated:  07/07/20 0051     proBNP 403.1 pg/mL     Narrative:       Among patients with dyspnea, NT-proBNP is highly sensitive for the detection of acute congestive heart failure. In addition NT-proBNP of <300 pg/ml effectively rules out acute congestive heart failure with 99% negative predictive value.    Results may be falsely decreased if patient taking Biotin.      Lactic Acid, Plasma [[id number]]  (Abnormal) Collected:  07/07/20 0021    Specimen:  Blood Updated:  07/07/20 0051     Lactate 7.2 mmol/L     Lactic Acid, Reflex Timer (This will reflex a repeat order 3-3:15 hours after ordered.) [[id number]] Collected:  07/07/20 0021    Specimen:  Blood Updated:  07/07/20 0051    Lipase [[id number]]  (Normal) Collected:  07/07/20 0021    Specimen:  Blood Updated:  07/07/20 0050     Lipase 14 U/L     Blood Culture - Blood, Arm, Right [[id number]] Collected:  07/07/20 0020    Specimen:  Blood from Arm, Right Updated:  07/07/20 0048    D-dimer, Quantitative [[id number]]  (Abnormal) Collected:  07/07/20 0021    Specimen:  Blood Updated:  07/07/20 0047     D-Dimer, Quantitative 0.73 mg/L (FEU)     Narrative:       Reference Range is 0-0.50 mg/L FEU. However, results <0.50 mg/L FEU tends to rule  out DVT or PE. Results >0.50 mg/L FEU are not useful in predicting absence or presence of DVT or PE.      CBC & Differential [[id number]] Collected:  07/07/20 0021    Specimen:  Blood Updated:  07/07/20 0040    Narrative:       The following orders were created for panel order CBC & Differential.  Procedure                               Abnormality         Status                     ---------                               -----------         ------                     CBC Auto Differential[[id number]]        Abnormal            Final result                 Please view results for these tests on the individual orders.    CBC Auto Differential [[id number]]  (Abnormal) Collected:  07/07/20 0021    Specimen:  Blood Updated:  07/07/20 0040     WBC 11.85 10*3/mm3      RBC 4.97 10*6/mm3      Hemoglobin 14.5 g/dL      Hematocrit 45.5 %      MCV 91.5 fL      MCH 29.2 pg      MCHC 31.9 g/dL      RDW 14.8 %      RDW-SD 49.9 fl      MPV 9.3 fL      Platelets 208 10*3/mm3      Neutrophil % 81.1 %      Lymphocyte % 14.3 %      Monocyte % 2.7 %      Eosinophil % 0.5 %      Basophil % 0.8 %      Immature Grans % 0.6 %      Neutrophils, Absolute 9.62 10*3/mm3      Lymphocytes, Absolute 1.69 10*3/mm3      Monocytes, Absolute 0.32 10*3/mm3      Eosinophils, Absolute 0.06 10*3/mm3      Basophils, Absolute 0.09 10*3/mm3      Immature Grans, Absolute 0.07 10*3/mm3      nRBC 0.0 /100 WBC     Blood Gas, Arterial [[id number]]  (Abnormal) Collected:  07/07/20 0021    Specimen:  Arterial Blood Updated:  07/07/20 0021     Site Left Radial     Daniel's Test Positive     pH, Arterial 7.406 pH units      pCO2, Arterial 25.6 mm Hg      Comment: 84 Value below reference range        pO2, Arterial 147.0 mm Hg      Comment: 83 Value above reference range        HCO3, Arterial 16.1 mmol/L      Comment: 84 Value below reference range        Base Excess, Arterial -6.7 mmol/L      Comment: 84 Value below reference range        O2 Saturation, Arterial 99.8 %        Comment: 83 Value above reference range        Temperature 37.0 C      Barometric Pressure for Blood Gas 750 mmHg      Modality Simple Mask     Flow Rate 12.0 lpm      Ventilator Mode NA     Collected by [id number]     Comment: Meter: [id number]     :  [id number]        pCO2, Temperature Corrected 25.6 mm Hg      pH, Temp Corrected 7.406 pH Units      pO2, Temperature Corrected 147 mm Hg         Imaging Results (Last 24 Hours)     Procedure Component Value Units Date/Time    CT Angiogram Chest [[id number]] Resulted:  07/07/20 0153     Updated:  07/07/20 0236    XR Chest 1 View [[id number]] Resulted:  07/07/20 0138     Updated:  07/07/20 0138        I have personally reviewed and interpreted the radiology studies and ECG obtained at time of admission.     Assessment / Plan     Assessment:   Active Hospital Problems    Diagnosis   • **Sepsis (CMS/HCC)   • Left lower lobe pneumonia   • Hypertension   • Bipolar 1 disorder (CMS/HCC)     Nursing home paperwork     • Chronic back pain     per Nursing home paperwork     • Alzheimer disease (CMS/HCC)     Per Nursing home paperwork     Sepsis due to left lower lobe pneumonia    Plan:   Admit to telemetry bed. Vitals every 4 hours.  Pureed diet consistency at SNF, will continue.  IVF repeat bolus and continue with NS 75 cc/hour   Zosyn for PNA with pseudomonas risk  Follow cultures  Home medications reconciled  DVT prophylaxis > Eliquis home dose    Prognosis is poor    Code Status: DNR     Estimated length of stay over 2 midnights    Patient seen and examined by me on 7/7/2020 at 530 AM.    Yannick Guillaume MD   07/07/20   03:22

## 2020-07-07 NOTE — ED PROVIDER NOTES
Subjective   Ms. Ernst is an 80-year-old female who presents the hospital with shortness of breath she is a demented female who resides in a nursing facility who is unable to participate any further history.          Review of Systems   Unable to perform ROS: Dementia       Past Medical History:   Diagnosis Date   • Alzheimer disease (CMS/HCC)     Per Nursing home paperwork   • Anxiety disorder     per Nursing home paperwork   • Arthritis    • Bipolar 1 disorder (CMS/HCC)     Nursing home paperwork   • Chronic back pain     per Nursing home paperwork   • Constipation     Per nursing home paperwork   • Dementia (CMS/HCC)     per nursing home paperwork   • Hyperlipidemia    • Hypertension    • Major depressive disorder     per Nursing home paperwork   • Osteoarthritis     per nursing home paperwork.   • Pulmonary air embolism (CMS/HCC)     Per nursing home paperwork       Allergies   Allergen Reactions   • Sulfa Antibiotics Unknown - High Severity   • Ultram [Tramadol Hcl] Unknown - High Severity       History reviewed. No pertinent surgical history.    History reviewed. No pertinent family history.    Social History     Socioeconomic History   • Marital status:      Spouse name: Not on file   • Number of children: Not on file   • Years of education: Not on file   • Highest education level: Not on file   Tobacco Use   • Smoking status: Never Smoker   • Smokeless tobacco: Never Used   Substance and Sexual Activity   • Alcohol use: Not Currently   • Drug use: Not Currently   • Sexual activity: Defer           Objective   Physical Exam   Constitutional: She is oriented to person, place, and time. She appears well-developed and well-nourished.   HENT:   Head: Normocephalic and atraumatic.   Mouth/Throat: Oropharynx is clear and moist.   Eyes: Pupils are equal, round, and reactive to light. Conjunctivae and EOM are normal.   Neck: Normal range of motion. Neck supple.   Cardiovascular: Normal rate, regular rhythm,  normal heart sounds and intact distal pulses.   Pulmonary/Chest: Effort normal. She has rhonchi ( Diffusely worse in the left lower lobe).   Abdominal: Soft. Bowel sounds are normal. She exhibits no distension.   Musculoskeletal: Normal range of motion. She exhibits no edema.   Neurological: She is alert and oriented to person, place, and time. No cranial nerve deficit.   Skin: Skin is warm and dry.   Psychiatric: She has a normal mood and affect. Her behavior is normal. Thought content normal.   Nursing note and vitals reviewed.      Procedures           ED Course                                           MDM    Final diagnoses:   Pneumonia of left lower lobe due to infectious organism            Oral Cohen MD  07/09/20 1500

## 2020-07-07 NOTE — PLAN OF CARE
Problem: Patient Care Overview  Goal: Plan of Care Review  Outcome: Ongoing (interventions implemented as appropriate)  Flowsheets (Taken 7/7/2020 1703)  Progress: no change  Plan of Care Reviewed With: patient  Outcome Summary: Pt does not complain of pain this shift but will occassionally moan. BP low at middle of shift, bolus ordered and BP improved. IVF infusing. NSR/ST  on tele. Fentanyl patch site changed this shift. Speech therapy did clear pt for a diet today, pureed with nectar thick liquids. Pt does require medicine crushed in applesauce. José Miguel protocol in place. Weaning oxygen requirement, on 8L open oxygen mask. Safety maintained. Will cont to monitor and call MD with any changes.

## 2020-07-08 LAB
ANION GAP SERPL CALCULATED.3IONS-SCNC: 10 MMOL/L (ref 5–15)
ANISOCYTOSIS BLD QL: ABNORMAL
BUN SERPL-MCNC: 18 MG/DL (ref 8–23)
BUN/CREAT SERPL: 27.7 (ref 7–25)
CALCIUM SPEC-SCNC: 8.4 MG/DL (ref 8.6–10.5)
CHLORIDE SERPL-SCNC: 115 MMOL/L (ref 98–107)
CO2 SERPL-SCNC: 22 MMOL/L (ref 22–29)
CREAT SERPL-MCNC: 0.65 MG/DL (ref 0.57–1)
DEPRECATED RDW RBC AUTO: 51 FL (ref 37–54)
ERYTHROCYTE [DISTWIDTH] IN BLOOD BY AUTOMATED COUNT: 15.1 % (ref 12.3–15.4)
GFR SERPL CREATININE-BSD FRML MDRD: 87 ML/MIN/1.73
GLUCOSE BLDC GLUCOMTR-MCNC: 102 MG/DL (ref 70–130)
GLUCOSE SERPL-MCNC: 107 MG/DL (ref 65–99)
HCT VFR BLD AUTO: 32 % (ref 34–46.6)
HGB BLD-MCNC: 10.3 G/DL (ref 12–15.9)
LYMPHOCYTES # BLD MANUAL: 1.72 10*3/MM3 (ref 0.7–3.1)
LYMPHOCYTES NFR BLD MANUAL: 13 % (ref 19.6–45.3)
LYMPHOCYTES NFR BLD MANUAL: 2 % (ref 5–12)
MCH RBC QN AUTO: 29.6 PG (ref 26.6–33)
MCHC RBC AUTO-ENTMCNC: 32.2 G/DL (ref 31.5–35.7)
MCV RBC AUTO: 92 FL (ref 79–97)
MONOCYTES # BLD AUTO: 0.27 10*3/MM3 (ref 0.1–0.9)
NEUTROPHILS # BLD AUTO: 11.26 10*3/MM3 (ref 1.7–7)
NEUTROPHILS NFR BLD MANUAL: 63 % (ref 42.7–76)
NEUTS BAND NFR BLD MANUAL: 22 % (ref 0–5)
PLAT MORPH BLD: NORMAL
PLATELET # BLD AUTO: 156 10*3/MM3 (ref 140–450)
PMV BLD AUTO: 9.7 FL (ref 6–12)
POLYCHROMASIA BLD QL SMEAR: ABNORMAL
POTASSIUM SERPL-SCNC: 3.5 MMOL/L (ref 3.5–5.2)
RBC # BLD AUTO: 3.48 10*6/MM3 (ref 3.77–5.28)
SODIUM SERPL-SCNC: 147 MMOL/L (ref 136–145)
TOXIC GRANULATION: ABNORMAL
WBC # BLD AUTO: 13.25 10*3/MM3 (ref 3.4–10.8)

## 2020-07-08 PROCEDURE — 82962 GLUCOSE BLOOD TEST: CPT

## 2020-07-08 PROCEDURE — 92526 ORAL FUNCTION THERAPY: CPT

## 2020-07-08 PROCEDURE — 25010000002 PIPERACILLIN SOD-TAZOBACTAM PER 1 G: Performed by: FAMILY MEDICINE

## 2020-07-08 PROCEDURE — 80048 BASIC METABOLIC PNL TOTAL CA: CPT | Performed by: FAMILY MEDICINE

## 2020-07-08 PROCEDURE — 85025 COMPLETE CBC W/AUTO DIFF WBC: CPT | Performed by: FAMILY MEDICINE

## 2020-07-08 PROCEDURE — 85007 BL SMEAR W/DIFF WBC COUNT: CPT | Performed by: FAMILY MEDICINE

## 2020-07-08 PROCEDURE — 87040 BLOOD CULTURE FOR BACTERIA: CPT | Performed by: FAMILY MEDICINE

## 2020-07-08 PROCEDURE — 25010000002 VANCOMYCIN PER 500 MG: Performed by: FAMILY MEDICINE

## 2020-07-08 RX ADMIN — VANCOMYCIN HYDROCHLORIDE 1000 MG: 1 INJECTION, SOLUTION INTRAVENOUS at 22:28

## 2020-07-08 RX ADMIN — HYDROCODONE BITARTRATE AND ACETAMINOPHEN 1 TABLET: 5; 325 TABLET ORAL at 20:10

## 2020-07-08 RX ADMIN — TAZOBACTAM SODIUM AND PIPERACILLIN SODIUM 3.38 G: 375; 3 INJECTION, SOLUTION INTRAVENOUS at 09:14

## 2020-07-08 RX ADMIN — RISPERIDONE 0.25 MG: 0.25 TABLET, FILM COATED ORAL at 09:16

## 2020-07-08 RX ADMIN — SODIUM CHLORIDE 75 ML/HR: 9 INJECTION, SOLUTION INTRAVENOUS at 17:15

## 2020-07-08 RX ADMIN — APIXABAN 5 MG: 5 TABLET, FILM COATED ORAL at 09:16

## 2020-07-08 RX ADMIN — SODIUM CHLORIDE, PRESERVATIVE FREE 10 ML: 5 INJECTION INTRAVENOUS at 20:11

## 2020-07-08 RX ADMIN — TAZOBACTAM SODIUM AND PIPERACILLIN SODIUM 3.38 G: 375; 3 INJECTION, SOLUTION INTRAVENOUS at 16:22

## 2020-07-08 RX ADMIN — MEGESTROL ACETATE 40 MG: 40 SUSPENSION ORAL at 09:16

## 2020-07-08 RX ADMIN — HYDROCODONE BITARTRATE AND ACETAMINOPHEN 1 TABLET: 5; 325 TABLET ORAL at 11:09

## 2020-07-08 RX ADMIN — SODIUM CHLORIDE, PRESERVATIVE FREE 10 ML: 5 INJECTION INTRAVENOUS at 09:16

## 2020-07-08 RX ADMIN — APIXABAN 5 MG: 5 TABLET, FILM COATED ORAL at 20:10

## 2020-07-08 RX ADMIN — FLUDROCORTISONE ACETATE 0.1 MG: 0.1 TABLET ORAL at 09:16

## 2020-07-08 RX ADMIN — HALOPERIDOL 1 MG: 1 TABLET ORAL at 09:16

## 2020-07-08 RX ADMIN — RISPERIDONE 0.25 MG: 0.25 TABLET, FILM COATED ORAL at 20:10

## 2020-07-08 NOTE — PLAN OF CARE
Problem: Patient Care Overview  Goal: Plan of Care Review  Outcome: Ongoing (interventions implemented as appropriate)  Flowsheets (Taken 7/8/2020 1736)  Progress: improving  Plan of Care Reviewed With: patient  Outcome Summary: Patient's lung sounds improved. NSR on tele. SLP changed from nectar to honey thick today.  Goal: Individualization and Mutuality  Outcome: Ongoing (interventions implemented as appropriate)  Flowsheets (Taken 7/8/2020 1736)  Patient Specific Interventions: turn q2h, feeder  Goal: Discharge Needs Assessment  Outcome: Ongoing (interventions implemented as appropriate)  Goal: Interprofessional Rounds/Family Conf  Outcome: Ongoing (interventions implemented as appropriate)     Problem: Fall Risk (Adult)  Goal: Absence of Fall  Outcome: Ongoing (interventions implemented as appropriate)  Flowsheets (Taken 7/8/2020 1736)  Absence of Fall: making progress toward outcome     Problem: Skin Injury Risk (Adult)  Goal: Skin Health and Integrity  Outcome: Ongoing (interventions implemented as appropriate)  Flowsheets (Taken 7/8/2020 1736)  Skin Health and Integrity: making progress toward outcome     Problem: Pneumonia (Adult)  Goal: Signs and Symptoms of Listed Potential Problems Will be Absent, Minimized or Managed (Pneumonia)  Outcome: Ongoing (interventions implemented as appropriate)  Flowsheets (Taken 7/8/2020 4977 by Yomaira Mejia RN)  Problems Assessed (Pneumonia): all  Problems Present (Pneumonia): infection progression;respiratory compromise     Problem: Sepsis/Septic Shock (Adult)  Goal: Signs and Symptoms of Listed Potential Problems Will be Absent, Minimized or Managed (Sepsis/Septic Shock)  Outcome: Ongoing (interventions implemented as appropriate)  Flowsheets (Taken 7/8/2020 6657 by Yomaira Mejia, RN)  Problems Assessed (Sepsis): all  Problems Present (Sepsis): situational response;infection progression

## 2020-07-08 NOTE — PROGRESS NOTES
"Pharmacy Dosing Service  Antimicrobial  Vancomycin Initial Evaluation    Assessment/Action/Plan:  Pharmacy to dose vancomycin for sepsis  Current end date: 7/11/20 (x 5 days)    Dosage initiated based on population pharmacokinetic parameters.  Regimen: 1000 mg every 24 hours for 5 doses.  Exposure target: AUC24 (range)400-600 mg/L.hr  AUC24,ss: 510 mg/L.hr  Ctrough,ss: 13.8 mg/L    Additional factors considered: unstable SCr  Vancomycin level(s) deferred for now. Pharmacy will continue to follow daily.     Subjective:  Zuleyma Ernst is currently receiving Vancomcyin for the treatment of sepsis, day #1 of 5.    Past Medical / Surgical / Social History reviewed.     Objective:  80 y.o. female Ht: 160 cm (62.99\"); Wt: 57.7 kg (127 lb 2 oz) Body mass index is 22.52 kg/m².  Estimated Creatinine Clearance: 39.1 mL/min (A) (by C-G formula based on SCr of 1.01 mg/dL (H)).    Lab Results   Component Value Date    BUN 24 (H) 07/07/2020    BUN 24 (H) 07/07/2020    BUN 8 05/20/2020    BUN 26 (H) 05/18/2020    BUN 30 (H) 05/17/2020      Lab Results   Component Value Date    CREATININE 1.01 (H) 07/07/2020    CREATININE 0.70 07/07/2020    CREATININE 0.5 05/20/2020    CREATININE 0.7 05/18/2020    CREATININE 0.9 05/17/2020      Lab Results   Component Value Date    WBC 13.17 (H) 07/07/2020    WBC 11.85 (H) 07/07/2020    WBC 11.8 (H) 05/20/2020        No results found for: VANCOPEAK, VANCOTROUGH, VANCORANDOM      Culture Results:  Microbiology Results (last 10 days)       Procedure Component Value - Date/Time    Blood Culture - Blood, Arm, Left [[id number]]  (Abnormal) Collected:  07/07/20 0122    Lab Status:  Preliminary result Specimen:  Blood from Arm, Left Updated:  07/07/20 1918     Blood Culture Abnormal Stain     Gram Stain Anaerobic Bottle Gram positive cocci    Blood Culture ID, PCR - Blood, Arm, Left [[id number]]  (Abnormal) Collected:  07/07/20 0122    Lab Status:  Final result Specimen:  Blood from Arm, Left Updated:  " 07/07/20 2046     BCID, PCR Staphylococcus spp, not aureus. Identification by BCID PCR.     BOTTLE TYPE Anaerobic Bottle    COVID PRE-OP / PRE-PROCEDURE SCREENING ORDER (NO ISOLATION) - Swab, Nasopharynx [[id number]] Collected:  07/07/20 0045    Lab Status:  Final result Specimen:  Swab from Nasopharynx Updated:  07/07/20 0117    Narrative:       The following orders were created for panel order COVID PRE-OP / PRE-PROCEDURE SCREENING ORDER (NO ISOLATION) - Swab, Nasopharynx.  Procedure                               Abnormality         Status                     ---------                               -----------         ------                     COVID-19, [facility] IN-HOUS...[[id number]]  Normal              Final result                 Please view results for these tests on the individual orders.    COVID-19, [facility] IN-HOUSE,NP Swab (NO TRANSPORT MEDIA) 2 HR TAT - Swab, Nasopharynx [[id number]]  (Normal) Collected:  07/07/20 0045    Lab Status:  Final result Specimen:  Swab from Nasopharynx Updated:  07/07/20 0117     COVID19 Not Detected    Narrative:       Fact sheet for providers: https://www.fda.gov/media/079378/download     Fact sheet for patients: https://www.fda.gov/media/789547/download            Suresh Minaya PharmD  07/07/20 21:38

## 2020-07-08 NOTE — THERAPY TREATMENT NOTE
Acute Care - Speech Language Pathology   Swallow Treatment Note [facility]     Patient Name: Zuleyma Ernst  : 1940  MRN: [id number]  Today's Date: 2020               Admit Date: 2020  Pt's breath sounds still sound congested at rest. Nursing charting from last night noted. Pt completed trials of honey thick liquids via spoon with SLP. No immediate coughing or throat clearing was observed, however the pt is at higher risk of silent aspiration. NurseCristy reported the pt appeared to tolerate meds crushed in applesauce this morning, but had increased s/s of aspiration with nectar thick. Will downgrade pt to honey thick and continue pureed diet. Again, pt is at an increased risk for silent aspiration and will likely not maintain adequate nutrition via PO, but family has stated that the pt would not want a feeding tube. SLP will continue to follow.  JOSE ALFREDO Raman 2020 10:48    Visit Dx:      ICD-10-CM ICD-9-CM   1. Oropharyngeal dysphagia R13.12 787.22     Patient Active Problem List   Diagnosis   • Left lower lobe pneumonia   • Hypertension   • Bipolar 1 disorder (CMS/HCC)   • Chronic back pain   • Alzheimer disease (CMS/HCC)   • Sepsis (CMS/HCC)       Therapy Treatment  Rehabilitation Treatment Summary     Row Name 20 1034             Treatment Time/Intention    Discipline  speech language pathologist  -MB      Document Type  therapy note (daily note)  -MB      Subjective Information  -- unable to respond  -MB      Mode of Treatment  speech-language pathology  -MB      Patient/Family Observations  No family present  -MB      Patient Effort  fair  -MB      Recorded by [MB] Madai Pérez CCC-SLP 20 1044      Row Name 20 1034             Pain Scale: FACES Pre/Post-Treatment    Pain: FACES Scale, Pretreatment  0-->no hurt  -MB      Recorded by [MB] Madai Pérez CCC-SLP 20 1044      Row Name 20 1034             Outcome Summary/Treatment Plan  (SLP)    Daily Summary of Progress (SLP)  progress toward functional goals is gradual  -MB      Barriers to Overall Progress (SLP)  Advanced dementia  -MB      Plan for Continued Treatment (SLP)  Downgrade to honey thick, contiue pureed  -MB      Anticipated Dischage Disposition (SLP)  skilled nursing facility  -MB      Recorded by [MB] Madai Pérez CCC-SLP 07/08/20 1044        User Key  (r) = Recorded By, (t) = Taken By, (c) = Cosigned By    Initials Name Effective Dates Discipline    Madai Wynne, CCC-SLP 08/02/16 -  SLP          Outcome Summary  Outcome Summary/Treatment Plan (SLP)  Daily Summary of Progress (SLP): progress toward functional goals is gradual (07/08/20 1034 : Madai Pérez, CCC-SLP)  Barriers to Overall Progress (SLP): Advanced dementia (07/08/20 1034 : Madai Pérez, CCC-SLP)  Plan for Continued Treatment (SLP): Downgrade to honey thick, contiue pureed (07/08/20 1034 : Madai Pérez, CCC-SLP)  Anticipated Dischage Disposition (SLP): skilled nursing facility (07/08/20 1034 : Madai Pérez CCC-SLP)      SLP GOALS     Row Name 07/08/20 1034 07/07/20 0830          Oral Nutrition/Hydration Goal 1 (SLP)    Oral Nutrition/Hydration Goal 1, SLP  Pt will tolerate least restrictive diet with no overt s/s of aspiration.  -MB  Pt will tolerate least restrictive diet with no overt s/s of aspiration.  -MB     Time Frame (Oral Nutrition/Hydration Goal 1, SLP)  by discharge  -MB  by discharge  -MB     Barriers (Oral Nutrition/Hydration Goal 1, SLP)  Advanced dementia  -MB  n/a  -MB     Progress/Outcomes (Oral Nutrition/Hydration Goal 1, SLP)  progress slower than expected  -MB  goal ongoing  -MB       User Key  (r) = Recorded By, (t) = Taken By, (c) = Cosigned By    Initials Name Provider Type    Madai Wynne, CCC-SLP Speech and Language Pathologist          EDUCATION  The patient has been educated in the following areas:   Dysphagia (Swallowing Impairment).    SLP  Recommendation and Plan  Daily Summary of Progress (SLP): progress toward functional goals is gradual  Barriers to Overall Progress (SLP): Advanced dementia  Plan for Continued Treatment (SLP): Downgrade to honey thick, contiue pureed  Anticipated Dischage Disposition (SLP): skilled nursing facility                    Time Calculation:   Time Calculation- SLP     Row Name 07/08/20 1047             Time Calculation- SLP    SLP Start Time  1034  -MB      SLP Stop Time  1043  -MB      SLP Time Calculation (min)  9 min  -MB      SLP Received On  07/08/20  -MB        User Key  (r) = Recorded By, (t) = Taken By, (c) = Cosigned By    Initials Name Provider Type    Madai Wynne CCC-SLP Speech and Language Pathologist          Therapy Charges for Today     Code Description Service Date Service Provider Modifiers Qty    89339501093 HC ST EVAL ORAL PHARYNG SWALLOW 4 7/7/2020 Madai Pérez CCC-SLP GN 1    [id number] HC ST TREATMENT SWALLOW 1 7/8/2020 Madai Pérez CCC-SLP GN 1                 JOSE ALFREDO Raman  7/8/2020

## 2020-07-08 NOTE — PLAN OF CARE
Problem: Patient Care Overview  Goal: Plan of Care Review  Outcome: Ongoing (interventions implemented as appropriate)  Flowsheets (Taken 7/8/2020 8374)  Progress: no change  Plan of Care Reviewed With: patient  Outcome Summary: Nonverbal. Pt moans at times. Turned q 2 hours. IV abx continue. Vancomycin ordered. BC positive for staph spp. MD notified. Gurgling wet sound noted while patient is breathing. See significant note. NSR. VSS. Safety maintained. WIll continue to monitor.

## 2020-07-08 NOTE — PROGRESS NOTES
[facility]  INPATIENT PROGRESS NOTE    Patient Name: Zuleyma Ernst  Date of Admission: 7/7/2020  Today's Date: 07/08/20  Length of Stay: 1  Primary Care Physician: Slade Barroso MD    Subjective   Chief Complaint: f/u   HPI   She is an 80-year-old nursing home resident who was admitted earlier today by Dr. Guillaume for sepsis from pneumonia suspected aspiration.     Assessment as of yesterday's encounter with her includes aspiration pneumonia with associated sepsis to which she is on empiric antibiotic.  She also has acute hypoxic respiratory failure secondary to aspiration pneumonia and possibly with component of hypoventilation.    Her EKG follow-up is normal sinus rhythm, Q waves present in lead III, she has a T wave abnormality V3 to V6.  She has a flat trend of troponin.    She had issue of hypotension to which I gave an order yesterday for fluid bolus.  I didn't see record of this was given based on I&O but confirmed with nursing there was 1 L given at 1203H. Her blood pressure is improved at 117/54 today.  She remains on open oxygen mask at 5 L with oxygen saturation of 100%    Trials of swallowing done today.  ST degraded her to honey thickened and puréed diet.  ST felt she is at increased risk for silent aspiration.    Overnight she sounded wet and reportedly had coarse crackles.  Patient has not been reported to be in distress in terms of her breathing.    Nurse aide informed me that she did not really eat much this whole day.  She has been more asleep particularly after Norco.  Norco was given earlier because she appears to be in pain by her facial expression.  She received Haldol at 9:16 AM, Norco at 11:09 AM, risperidone at 9:16 AM.  The Haldol and Risperdal are for normal medications   Review of Systems     She is unable to participate in her care.   Objective    Temp:  [97.4 °F (36.3 °C)-98 °F (36.7 °C)] 97.4 °F (36.3 °C)  Heart Rate:   [54-89] 54  Resp:  [16-24] 16  BP: ()/(44-77) 117/54  Physical Exam  She appears hemodynamically stable with a facial mask O2 saturation is 100% on 6 L  Patient kept her eyes closed during the whole examination.  She does not follow commands.  She has spontaneous movement of upper extremities.  I could not see very well through the face shield her eyes besides I can describe it as anicteric and has pink conjunctiva.  She has diminished inspiratory effort, I could not appreciate any wheezes or crackles  Normocephalic and atraumatic head  No thyromegaly  S1-S2 grossly no murmur, regular heart rate  Soft abdomen, nontender, no gross organomegaly  No cyanosis or clubbing or edema  Contractures of lower extremities  She has a fentanyl patch on right deltoid         Results Review:  I have reviewed the labs, radiology results, and diagnostic studies.    Laboratory Data:   Results from last 7 days   Lab Units 07/08/20  0509 07/07/20  0538 07/07/20  0021   WBC 10*3/mm3 13.25* 13.17* 11.85*   HEMOGLOBIN g/dL 10.3* 13.1 14.5   HEMATOCRIT % 32.0* 41.0 45.5   PLATELETS 10*3/mm3 156 187 208        Results from last 7 days   Lab Units 07/08/20  0509 07/07/20  0538 07/07/20  0021   SODIUM mmol/L 147* 146* 144   POTASSIUM mmol/L 3.5 3.9 4.1   CHLORIDE mmol/L 115* 107 105   CO2 mmol/L 22.0 18.0* 19.0*   BUN mg/dL 18 24* 24*   CREATININE mg/dL 0.65 1.01* 0.70   CALCIUM mg/dL 8.4* 9.2 9.7   BILIRUBIN mg/dL  --   --  0.8   ALK PHOS U/L  --   --  111   ALT (SGPT) U/L  --   --  26   AST (SGOT) U/L  --   --  31   GLUCOSE mg/dL 107* 215* 184*       Culture Data:   Blood Culture   Date Value Ref Range Status   07/07/2020 Staphylococcus, coagulase negative (C)  Preliminary     Comment:     Probable contaminant requires clinical correlation, susceptibility not performed unless requested by physician.     07/07/2020 No growth at 24 hours  Preliminary     Urine Culture   Date Value Ref Range Status   07/07/2020 >100,000 CFU/mL Proteus  species (A)  Preliminary       Radiology Data:   Imaging Results (Last 24 Hours)     Procedure Component Value Units Date/Time    SCANNED - IMAGING [[id number]] Resulted:  07/07/20      Updated:  07/08/20 0657          I have reviewed the patient's current medications.     Assessment/Plan     Active Hospital Problems    Diagnosis   • **Sepsis (CMS/[facility])   • Left lower lobe pneumonia   • Hypertension   • Bipolar 1 disorder (CMS/[facility])     Nursing home paperwork     • Chronic back pain     per Nursing home paperwork     • Alzheimer disease (CMS/[facility])     Per Nursing home paperwork         · Aspiration pneumonia  · Sepsis secondary to above and or probably dentia urinary tract infection  · Staphylococcus coagulase-negative bacteremia (? Real vs contaminant) unclear of its significance significance.  This may not be real.  Will check surveillance culture  · Anion gap metabolic acidosis from lactic acidosis.  Likely cause from sepsis  · Mildly elevated troponin - her EKG has an undetermined rhythm with marked ST's abnormality.  repeat EKG  normal sinus rhythm, Q waves present in lead III, she has a T wave abnormality V3 to V6.  She has a flat trend of troponin.  It could possibly be a type II elevation from sepsis.  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 No bleeding   Repeat bc  I dc haldol and trazodone and consider d/c risperidone too.   Check sugar     apixaban 5 mg Oral BID   fentaNYL 1 patch  Transdermal Q72H   fludrocortisone 0.1 mg Oral Daily   haloperidol 1 mg Oral TID   HYDROcodone-acetaminophen 1 tablet Oral BID   megestrol 40 mg Oral Daily   risperiDONE 0.25 mg Oral BID   sodium chloride 10 mL Intravenous Q12H   traZODone 150 mg Oral Nightly   vancomycin 1,000 mg Intravenous Q24H     Labs in AM    Discharge Planning:james Simon MD   07/08/20   16:22

## 2020-07-08 NOTE — SIGNIFICANT NOTE
Around 0000 I noticed the pt was making a gurgling wet sound when she was breathing. I listened to her lungs and heard the same sound along with coarse crackles. At the beginning of the shift the patient only had coarse crackles, and not the gurgling wet sound.     I gave the patient her meds crushed in applesauce, and tried to feed her some of her dinner around 2130. The patient didn't cough as I gave her meds and food.The doctor noted that the pt has possible silent aspiration. I think this gurgling wet sound is evidence of that.    The patient is stable on 8L O2 via open air mask. No signs of distressed breathing.      Yomaira Mejia, RN, BSN

## 2020-07-08 NOTE — PLAN OF CARE
Problem: Patient Care Overview  Goal: Plan of Care Review  Outcome: Ongoing (interventions implemented as appropriate)  Flowsheets (Taken 7/8/2020 1044)  Progress: no change  Plan of Care Reviewed With: other (see comments) (NurseCristy)  Outcome Summary: Pt's breath sounds still sound congested at rest. Nursing charting from last night noted. Pt completed trials of honey thick liquids via spoon with SLP. No immediate coughing or throat clearing was observed, however the pt is at higher risk of silent aspiration. NurseCristy reported the pt appeared to tolerate meds crushed in applesauce this morning, but had increased s/s of aspiration with nectar thick. Will downgrade pt to honey thick and continue pureed diet. Again, pt is at an increased risk for silent aspiration and will likely not maintain adequate nutrition via PO, but family has stated that the pt would not want a feeding tube. SLP will continue to follow.

## 2020-07-09 LAB
ANION GAP SERPL CALCULATED.3IONS-SCNC: 8 MMOL/L (ref 5–15)
BACTERIA SPEC AEROBE CULT: ABNORMAL
BACTERIA SPEC AEROBE CULT: ABNORMAL
BASOPHILS # BLD AUTO: 0.03 10*3/MM3 (ref 0–0.2)
BASOPHILS NFR BLD AUTO: 0.3 % (ref 0–1.5)
BUN SERPL-MCNC: 14 MG/DL (ref 8–23)
BUN/CREAT SERPL: 25 (ref 7–25)
CALCIUM SPEC-SCNC: 8.6 MG/DL (ref 8.6–10.5)
CHLORIDE SERPL-SCNC: 114 MMOL/L (ref 98–107)
CO2 SERPL-SCNC: 23 MMOL/L (ref 22–29)
CREAT SERPL-MCNC: 0.56 MG/DL (ref 0.57–1)
DEPRECATED RDW RBC AUTO: 49.9 FL (ref 37–54)
EOSINOPHIL # BLD AUTO: 0.18 10*3/MM3 (ref 0–0.4)
EOSINOPHIL NFR BLD AUTO: 1.6 % (ref 0.3–6.2)
ERYTHROCYTE [DISTWIDTH] IN BLOOD BY AUTOMATED COUNT: 14.6 % (ref 12.3–15.4)
GFR SERPL CREATININE-BSD FRML MDRD: 104 ML/MIN/1.73
GLUCOSE BLDC GLUCOMTR-MCNC: 123 MG/DL (ref 70–130)
GLUCOSE SERPL-MCNC: 90 MG/DL (ref 65–99)
GRAM STN SPEC: ABNORMAL
GRAM STN SPEC: ABNORMAL
HCT VFR BLD AUTO: 32.8 % (ref 34–46.6)
HGB BLD-MCNC: 10.6 G/DL (ref 12–15.9)
IMM GRANULOCYTES # BLD AUTO: 0.09 10*3/MM3 (ref 0–0.05)
IMM GRANULOCYTES NFR BLD AUTO: 0.8 % (ref 0–0.5)
ISOLATED FROM: ABNORMAL
LYMPHOCYTES # BLD AUTO: 1.45 10*3/MM3 (ref 0.7–3.1)
LYMPHOCYTES NFR BLD AUTO: 12.5 % (ref 19.6–45.3)
MCH RBC QN AUTO: 29.7 PG (ref 26.6–33)
MCHC RBC AUTO-ENTMCNC: 32.3 G/DL (ref 31.5–35.7)
MCV RBC AUTO: 91.9 FL (ref 79–97)
MONOCYTES # BLD AUTO: 0.38 10*3/MM3 (ref 0.1–0.9)
MONOCYTES NFR BLD AUTO: 3.3 % (ref 5–12)
NEUTROPHILS NFR BLD AUTO: 81.5 % (ref 42.7–76)
NEUTROPHILS NFR BLD AUTO: 9.46 10*3/MM3 (ref 1.7–7)
NRBC BLD AUTO-RTO: 0 /100 WBC (ref 0–0.2)
PLATELET # BLD AUTO: 160 10*3/MM3 (ref 140–450)
PMV BLD AUTO: 9.8 FL (ref 6–12)
POTASSIUM SERPL-SCNC: 3 MMOL/L (ref 3.5–5.2)
RBC # BLD AUTO: 3.57 10*6/MM3 (ref 3.77–5.28)
SODIUM SERPL-SCNC: 145 MMOL/L (ref 136–145)
WBC # BLD AUTO: 11.59 10*3/MM3 (ref 3.4–10.8)

## 2020-07-09 PROCEDURE — 25010000002 PIPERACILLIN SOD-TAZOBACTAM PER 1 G: Performed by: INTERNAL MEDICINE

## 2020-07-09 PROCEDURE — 92526 ORAL FUNCTION THERAPY: CPT

## 2020-07-09 PROCEDURE — 85025 COMPLETE CBC W/AUTO DIFF WBC: CPT | Performed by: FAMILY MEDICINE

## 2020-07-09 PROCEDURE — 80048 BASIC METABOLIC PNL TOTAL CA: CPT | Performed by: FAMILY MEDICINE

## 2020-07-09 RX ORDER — POTASSIUM CHLORIDE 750 MG/1
40 CAPSULE, EXTENDED RELEASE ORAL DAILY
Status: DISCONTINUED | OUTPATIENT
Start: 2020-07-09 | End: 2020-07-10 | Stop reason: HOSPADM

## 2020-07-09 RX ORDER — MEGESTROL ACETATE 40 MG/ML
400 SUSPENSION ORAL DAILY
Status: DISCONTINUED | OUTPATIENT
Start: 2020-07-10 | End: 2020-07-10 | Stop reason: HOSPADM

## 2020-07-09 RX ADMIN — TAZOBACTAM SODIUM AND PIPERACILLIN SODIUM 3.38 G: 375; 3 INJECTION, SOLUTION INTRAVENOUS at 16:50

## 2020-07-09 RX ADMIN — SODIUM CHLORIDE 75 ML/HR: 9 INJECTION, SOLUTION INTRAVENOUS at 19:09

## 2020-07-09 RX ADMIN — HYDROCODONE BITARTRATE AND ACETAMINOPHEN 1 TABLET: 5; 325 TABLET ORAL at 21:44

## 2020-07-09 RX ADMIN — SODIUM CHLORIDE, PRESERVATIVE FREE 10 ML: 5 INJECTION INTRAVENOUS at 21:44

## 2020-07-09 RX ADMIN — APIXABAN 5 MG: 5 TABLET, FILM COATED ORAL at 10:00

## 2020-07-09 RX ADMIN — SODIUM CHLORIDE, PRESERVATIVE FREE 10 ML: 5 INJECTION INTRAVENOUS at 10:00

## 2020-07-09 RX ADMIN — APIXABAN 5 MG: 5 TABLET, FILM COATED ORAL at 21:44

## 2020-07-09 RX ADMIN — POTASSIUM CHLORIDE 40 MEQ: 750 CAPSULE, EXTENDED RELEASE ORAL at 14:56

## 2020-07-09 RX ADMIN — FLUDROCORTISONE ACETATE 0.1 MG: 0.1 TABLET ORAL at 10:00

## 2020-07-09 RX ADMIN — RISPERIDONE 0.25 MG: 0.25 TABLET, FILM COATED ORAL at 10:00

## 2020-07-09 RX ADMIN — SODIUM CHLORIDE 75 ML/HR: 9 INJECTION, SOLUTION INTRAVENOUS at 05:24

## 2020-07-09 RX ADMIN — HYDROCODONE BITARTRATE AND ACETAMINOPHEN 1 TABLET: 5; 325 TABLET ORAL at 10:00

## 2020-07-09 RX ADMIN — RISPERIDONE 0.25 MG: 0.25 TABLET, FILM COATED ORAL at 21:44

## 2020-07-09 NOTE — PLAN OF CARE
Problem: Patient Care Overview  Goal: Plan of Care Review  Outcome: Ongoing (interventions implemented as appropriate)  Flowsheets (Taken 7/9/2020 3694)  Progress: no change  Plan of Care Reviewed With: patient  Outcome Summary: Pt showed some nonverbal indicators of pain-scheduled pain med given. Turned Q2H. Plans for d/c back to NH tomorrow.

## 2020-07-09 NOTE — THERAPY TREATMENT NOTE
Acute Care - Speech Language Pathology   Swallow Treatment Note [facility]     Patient Name: Zuleyma Ernst  : 1940  MRN: [id number]  Today's Date: 2020               Admit Date: 2020     Swallowing tx completed this AM. Pt was unable to follow commands at time of visit, minimal eye opening with no evidence of visual tracking or attending. She was provided labial tactile stim via spoon, following which she showed appropriate initiation of attempts at accepting the bolus. Despite this, weak mandibular ROM was noted, with weak labial seal, resulting in only a minimal to mild amount of bolus removal from spoon during acceptance. She appeared to have timely, functional oral manipulation of the bolus. No noted concerns with laryngeal elevation. No overt s/s of aspiration, with clear vocal quality noted during spontaneous vocalizations. No family present. Unit RN, Kayla, stated pt appeared to tolerate her breakfast well this AM, consuming roughly 25% of meal tray. Cannot fully r/o aspiration at this time, yet feel pt is ok to continue current diet of pureed diet consistency with honey thick liquid consistency. ST to continue to monitor PRN. Thanks! Brenna Costello, PATRICE-SLP 2020 09:00    Visit Dx:      ICD-10-CM ICD-9-CM   1. Oropharyngeal dysphagia R13.12 787.22     Patient Active Problem List   Diagnosis   • Left lower lobe pneumonia   • Hypertension   • Bipolar 1 disorder (CMS/HCC)   • Chronic back pain   • Alzheimer disease (CMS/HCC)   • Sepsis (CMS/HCC)       Therapy Treatment  Rehabilitation Treatment Summary     Row Name 20 0842             Treatment Time/Intention    Discipline  speech language pathologist  -TM      Document Type  therapy note (daily note)  -TM      Subjective Information  weakness;fatigue  -TM      Mode of Treatment  individual therapy;speech-language pathology  -TM      Patient/Family Observations  No family present.  -TM      Patient Effort  fair  -TM      Recorded by  [TM] Brenna Costello CCC-SLP 07/09/20 0854      Row Name 07/09/20 0842             Pain Assessment    Additional Documentation  Pain Scale: FACES Pre/Post-Treatment (Group)  -TM      Recorded by [TM] Brenna Costello CCC-SLP 07/09/20 0854      Row Name 07/09/20 0842             Pain Scale: FACES Pre/Post-Treatment    Pain: FACES Scale, Pretreatment  0-->no hurt  -TM      Pain: FACES Scale, Post-Treatment  0-->no hurt  -TM      Pre/Post Treatment Pain Comment  Pt unable to confirm or deny pain despite max cues.  -TM      Recorded by [TM] Brenna Costello CCC-SLP 07/09/20 0854      Row Name 07/09/20 0842             Outcome Summary/Treatment Plan (SLP)    Daily Summary of Progress (SLP)  progress towards functional goals is fair  -TM      Barriers to Overall Progress (SLP)  Advanced dementia  -TM      Plan for Continued Treatment (SLP)  Continue diet of pureed with honey thick liquids.   -TM      Anticipated Dischage Disposition (SLP)  skilled nursing [facility]  -TM      Recorded by [TM] Brenna Costello CCC-SLP 07/09/20 0854        User Key  (r) = Recorded By, (t) = Taken By, (c) = Cosigned By    Initials Name Effective Dates Discipline    TM Brenna Costello CCC-SLP 08/02/16 -  SLP          Outcome Summary  Outcome Summary/Treatment Plan (SLP)  Daily Summary of Progress (SLP): progress towards functional goals is fair (07/09/20 0842 : Brenna Costello CCC-SLP)  Barriers to Overall Progress (SLP): Advanced dementia (07/09/20 0842 : Brenna Costello, CCC-SLP)  Plan for Continued Treatment (SLP): Continue diet of pureed with honey thick liquids.  (07/09/20 0842 : Brenna Costello CCC-SLP)  Anticipated Dischage Disposition (SLP): skilled nursing [facility] (07/09/20 0842 : Brenna Costello CCC-SLP)      SLP GOALS     Row Name 07/09/20 0842 07/08/20 1034 07/07/20 0830       Oral Nutrition/Hydration Goal 1 (SLP)    Oral Nutrition/Hydration Goal 1, SLP  Pt will tolerate least restrictive diet with no overt s/s of aspiration.   -TM  Pt will tolerate least restrictive diet with no overt s/s of aspiration.  -MB  Pt will tolerate least restrictive diet with no overt s/s of aspiration.  -MB    Time Frame (Oral Nutrition/Hydration Goal 1, SLP)  by discharge  -TM  by discharge  -MB  by discharge  -MB    Barriers (Oral Nutrition/Hydration Goal 1, SLP)  Advanced dementia  -TM  Advanced dementia  -MB  n/a  -MB    Progress/Outcomes (Oral Nutrition/Hydration Goal 1, SLP)  progress slower than expected  -TM  progress slower than expected  -MB  goal ongoing  -MB      User Key  (r) = Recorded By, (t) = Taken By, (c) = Cosigned By    Initials Name Provider Type    Madai Wynne, CCC-SLP Speech and Language Pathologist    Brenna Sainz CCC-SLP Speech and Language Pathologist          EDUCATION  The patient has been educated in the following areas:   Dysphagia (Swallowing Impairment).    SLP Recommendation and Plan  Daily Summary of Progress (SLP): progress towards functional goals is fair  Barriers to Overall Progress (SLP): Advanced dementia  Plan for Continued Treatment (SLP): Continue diet of pureed with honey thick liquids.   Anticipated Dischage Disposition (SLP): skilled nursing facility                    Time Calculation:   Time Calculation- SLP     Row Name 07/09/20 0900             Time Calculation- SLP    SLP Start Time  0842  -      SLP Stop Time  0851  -      SLP Time Calculation (min)  9 min  -      SLP Received On  07/09/20  -        User Key  (r) = Recorded By, (t) = Taken By, (c) = Cosigned By    Initials Name Provider Type    Brenna Sainz CCC-SLP Speech and Language Pathologist          Therapy Charges for Today     Code Description Service Date Service Provider Modifiers Qty    [id number]  ST TREATMENT SWALLOW 1 7/9/2020 Brenna Costello CCC-SLP GN 1                 Brenna A. Costello, CCC-SLP  7/9/2020

## 2020-07-09 NOTE — PLAN OF CARE
Problem: Patient Care Overview  Goal: Plan of Care Review  Outcome: Ongoing (interventions implemented as appropriate)  Note:   VSS, nonverbal indicators of pain absent; pt was initially on rm air, desated to 80s, placed back on open mask with 2 L, and decreased to 1L/rm air depending on sats, maintaining upper 90s on 1 L currently; pt remains nonverbal but alerts to voice easily; vancomycin and IV fluids @ 75 continue; SA/S 55-69 with a dip down to 44 PAC on tele pt turned Q2 and z-guard applied to bottom; coccyx pink and blanchable; pillow kept between knees; bed alarm on; safety maintained, will continue to monitor      Problem: Patient Care Overview  Goal: Individualization and Mutuality  Outcome: Ongoing (interventions implemented as appropriate)     Problem: Patient Care Overview  Goal: Discharge Needs Assessment  Outcome: Ongoing (interventions implemented as appropriate)     Problem: Patient Care Overview  Goal: Interprofessional Rounds/Family Conf  Outcome: Ongoing (interventions implemented as appropriate)     Problem: Fall Risk (Adult)  Goal: Absence of Fall  Outcome: Ongoing (interventions implemented as appropriate)     Problem: Pneumonia (Adult)  Goal: Signs and Symptoms of Listed Potential Problems Will be Absent, Minimized or Managed (Pneumonia)  Outcome: Ongoing (interventions implemented as appropriate)     Problem: Sepsis/Septic Shock (Adult)  Goal: Signs and Symptoms of Listed Potential Problems Will be Absent, Minimized or Managed (Sepsis/Septic Shock)  Outcome: Ongoing (interventions implemented as appropriate)

## 2020-07-09 NOTE — PLAN OF CARE
Problem: Patient Care Overview  Goal: Plan of Care Review  Outcome: Ongoing (interventions implemented as appropriate)  Flowsheets (Taken 7/9/2020 6841)  Progress: no change  Plan of Care Reviewed With: patient  Outcome Summary: Swallowing tx completed this AM. Pt was unable to follow commands at time of visit, minimal eye opening with no evidence of visual tracking or attending. She was provided labial tactile stim via spoon, following which she showed appropriate initiation of attempts at accepting the bolus. Despite this, weak mandibular ROM was noted, with weak labial seal, resulting in only a minimal to mild amount of bolus removal from spoon during acceptance. She appeared to have timely, functional oral manipulation of the bolus. No noted concerns with laryngeal elevation. No overt s/s of aspiration, with clear vocal quality noted during spontaneous vocalizations. No family present. Unit RN, Kayla, stated pt appeared to tolerate her breakfast well this AM, consuming roughly 25% of meal tray. Cannot fully r/o aspiration at this time, yet feel pt is ok to continue current diet of pureed diet consistency with honey thick liquid consistency. ST to continue to monitor PRN. Thanks!

## 2020-07-09 NOTE — PROGRESS NOTES
1           [facility] Medicine Services  INPATIENT PROGRESS NOTE    Patient Name: Zuleyma Ernst  Date of Admission: 7/7/2020  Today's Date: 07/09/20  Length of Stay: 2  Primary Care Physician: Slade Barroso MD    Subjective   Chief Complaint: Follow-up  HPI   Yesterday I had to stop couple of medicines that I felt could be sedating and maintained her on the Risperdal only.    Patient appears to be more responsive compared to yesterday since the removal of the trazodone and Haldol.  Still not able to give any significant information and participate in her care.  Nursing aide mentioned that she ate well with her.  She has been afebrile.  Her blood pressure is improved.  I had nurse Oliver confirm through pharmacy  regarding Megace.  Review of Systems   Unable to actively participate in her care. Dementia    Objective    Temp:  [97.4 °F (36.3 °C)-98.7 °F (37.1 °C)] 97.4 °F (36.3 °C)  Heart Rate:  [54-66] 60  Resp:  [16-18] 16  BP: (116-151)/(52-74) 151/73  Physical Exam  She appears hemodynamically stable with a facial mask O2 saturation is 100% on 6 L  She is readily arousable now with her eyes open she remains not following command nor have I seen her spontaneously move her extremities during my visit.  Withdraws to pain   anicteric sclera and has pink conjunctiva.  She has diminished inspiratory effort, I could not appreciate any wheezes or crackles  Normocephalic and atraumatic head  No thyromegaly  S1-S2 grossly no murmur, regular heart rate  Soft abdomen, nontender, no gross organomegaly  No cyanosis or clubbing or edema  Contractures of lower extremities  She has a fentanyl patch on right deltoid         Results Review:  I have reviewed the labs, radiology results, and diagnostic studies.    Laboratory Data:   Results from last 7 days   Lab Units 07/09/20  0540 07/08/20  0509 07/07/20  0538   WBC 10*3/mm3 11.59* 13.25* 13.17*   HEMOGLOBIN g/dL 10.6* 10.3* 13.1      HEMATOCRIT % 32.8* 32.0* 41.0   PLATELETS 10*3/mm3 160 156 187        Results from last 7 days   Lab Units 07/09/20  0540 07/08/20  0509 07/07/20  0538 07/07/20  0021   SODIUM mmol/L 145 147* 146* 144   POTASSIUM mmol/L 3.0* 3.5 3.9 4.1   CHLORIDE mmol/L 114* 115* 107 105   CO2 mmol/L 23.0 22.0 18.0* 19.0*   BUN mg/dL 14 18 24* 24*   CREATININE mg/dL 0.56* 0.65 1.01* 0.70   CALCIUM mg/dL 8.6 8.4* 9.2 9.7   BILIRUBIN mg/dL  --   --   --  0.8   ALK PHOS U/L  --   --   --  111   ALT (SGPT) U/L  --   --   --  26   AST (SGOT) U/L  --   --   --  31   GLUCOSE mg/dL 90 107* 215* 184*       Culture Data:   Blood Culture   Date Value Ref Range Status   07/08/2020 No growth at 24 hours  Preliminary   07/07/2020 Staphylococcus, coagulase negative (C)  Final     Comment:     Probable contaminant requires clinical correlation, susceptibility not performed unless requested by physician.     07/07/2020 No growth at 2 days  Preliminary     Urine Culture   Date Value Ref Range Status   07/07/2020 >100,000 CFU/mL Proteus mirabilis (A)  Final       Radiology Data:   Imaging Results (Last 24 Hours)     ** No results found for the last 24 hours. **          I have reviewed the patient's current medications.     Assessment/Plan     Active Hospital Problems    Diagnosis   • **Sepsis (CMS/[facility])   • Left lower lobe pneumonia   • Hypertension   • Bipolar 1 disorder (CMS/[facility])     Nursing home paperwork     • Chronic back pain     per Nursing home paperwork     • Alzheimer disease (CMS/[facility])     Per Nursing home paperwork           · Aspiration pneumonia  · Sepsis secondary to above and or probably dentia urinary tract infection  · Staphylococcus coagulase-negative bacteremia- felt as contaminant  · Anion gap metabolic acidosis from lactic acidosis.  Likely cause from sepsis  · Mildly elevated troponin - her EKG has an undetermined rhythm with marked ST's abnormality.  repeat EKG  normal sinus rhythm, Q waves present in lead III, she has a T  wave abnormality V3 to V6.  She has a flat trend of troponin.  It could possibly be a type II elevation from sepsis.  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 No bleeding   Repeat bc - ngtd; d/c vanc; cont zosyn UTI and concern for aspiration PNA) vs changing to unasyn and subsequently switch to augmentin   I dc'd haldol and trazodone (on July 8) Check sugar       Carefully reviewing the above culture report, I felt that the Staphylococcus coagulase-negative found on a latter blood culture 0122-hour on July 7 is a contaminant.  I also believe that her infection is mostly from her urine but she also has an abnormality on CT imaging suggestive of left lower lobe pneumonia and patchy bronchopneumonia within the left lingula and right middle lobe.  Replace potassium (hypokalemia)      apixaban 5 mg Oral BID   fentaNYL 1 patch Transdermal Q72H   fludrocortisone 0.1 mg Oral Daily   HYDROcodone-acetaminophen 1 tablet Oral BID   megestrol 40 mg Oral Daily   risperiDONE 0.25 mg Oral BID   sodium chloride 10 mL Intravenous Q12H   vancomycin 1,000 mg Intravenous Q24H         Discharge Planning: james Simon MD   07/09/20   14:16

## 2020-07-10 VITALS
TEMPERATURE: 98.6 F | HEART RATE: 67 BPM | BODY MASS INDEX: 21.97 KG/M2 | OXYGEN SATURATION: 97 % | DIASTOLIC BLOOD PRESSURE: 64 MMHG | RESPIRATION RATE: 16 BRPM | SYSTOLIC BLOOD PRESSURE: 150 MMHG | HEIGHT: 63 IN | WEIGHT: 124 LBS

## 2020-07-10 PROBLEM — A49.8 PROTEUS MIRABILIS INFECTION: Status: ACTIVE | Noted: 2020-07-10

## 2020-07-10 PROBLEM — R53.81 DEBILITY: Status: ACTIVE | Noted: 2020-07-10

## 2020-07-10 LAB
ANION GAP SERPL CALCULATED.3IONS-SCNC: 12 MMOL/L (ref 5–15)
BASOPHILS # BLD AUTO: 0.04 10*3/MM3 (ref 0–0.2)
BASOPHILS NFR BLD AUTO: 0.5 % (ref 0–1.5)
BUN SERPL-MCNC: 8 MG/DL (ref 8–23)
BUN/CREAT SERPL: 13.8 (ref 7–25)
CALCIUM SPEC-SCNC: 8.7 MG/DL (ref 8.6–10.5)
CHLORIDE SERPL-SCNC: 107 MMOL/L (ref 98–107)
CO2 SERPL-SCNC: 24 MMOL/L (ref 22–29)
CREAT SERPL-MCNC: 0.58 MG/DL (ref 0.57–1)
DEPRECATED RDW RBC AUTO: 45.7 FL (ref 37–54)
EOSINOPHIL # BLD AUTO: 0.09 10*3/MM3 (ref 0–0.4)
EOSINOPHIL NFR BLD AUTO: 1.2 % (ref 0.3–6.2)
ERYTHROCYTE [DISTWIDTH] IN BLOOD BY AUTOMATED COUNT: 14.1 % (ref 12.3–15.4)
GFR SERPL CREATININE-BSD FRML MDRD: 100 ML/MIN/1.73
GLUCOSE SERPL-MCNC: 72 MG/DL (ref 65–99)
HCT VFR BLD AUTO: 35.6 % (ref 34–46.6)
HGB BLD-MCNC: 11.8 G/DL (ref 12–15.9)
IMM GRANULOCYTES # BLD AUTO: 0.13 10*3/MM3 (ref 0–0.05)
IMM GRANULOCYTES NFR BLD AUTO: 1.7 % (ref 0–0.5)
LYMPHOCYTES # BLD AUTO: 1.41 10*3/MM3 (ref 0.7–3.1)
LYMPHOCYTES NFR BLD AUTO: 18.5 % (ref 19.6–45.3)
MCH RBC QN AUTO: 29.6 PG (ref 26.6–33)
MCHC RBC AUTO-ENTMCNC: 33.1 G/DL (ref 31.5–35.7)
MCV RBC AUTO: 89.2 FL (ref 79–97)
MONOCYTES # BLD AUTO: 0.31 10*3/MM3 (ref 0.1–0.9)
MONOCYTES NFR BLD AUTO: 4.1 % (ref 5–12)
NEUTROPHILS NFR BLD AUTO: 5.63 10*3/MM3 (ref 1.7–7)
NEUTROPHILS NFR BLD AUTO: 74 % (ref 42.7–76)
NRBC BLD AUTO-RTO: 0 /100 WBC (ref 0–0.2)
PLATELET # BLD AUTO: 157 10*3/MM3 (ref 140–450)
PMV BLD AUTO: 9.7 FL (ref 6–12)
POTASSIUM SERPL-SCNC: 3.4 MMOL/L (ref 3.5–5.2)
RBC # BLD AUTO: 3.99 10*6/MM3 (ref 3.77–5.28)
SARS-COV-2 RDRP RESP QL NAA+PROBE: NOT DETECTED
SODIUM SERPL-SCNC: 143 MMOL/L (ref 136–145)
WBC # BLD AUTO: 7.61 10*3/MM3 (ref 3.4–10.8)

## 2020-07-10 PROCEDURE — 87635 SARS-COV-2 COVID-19 AMP PRB: CPT | Performed by: INTERNAL MEDICINE

## 2020-07-10 PROCEDURE — 80048 BASIC METABOLIC PNL TOTAL CA: CPT | Performed by: FAMILY MEDICINE

## 2020-07-10 PROCEDURE — 25010000002 PIPERACILLIN SOD-TAZOBACTAM PER 1 G: Performed by: INTERNAL MEDICINE

## 2020-07-10 PROCEDURE — 85025 COMPLETE CBC W/AUTO DIFF WBC: CPT | Performed by: FAMILY MEDICINE

## 2020-07-10 RX ORDER — AMOXICILLIN AND CLAVULANATE POTASSIUM 250; 62.5 MG/5ML; MG/5ML
875 POWDER, FOR SUSPENSION ORAL 2 TIMES DAILY
Qty: 245 ML | Refills: 0 | Status: SHIPPED | OUTPATIENT
Start: 2020-07-10 | End: 2020-07-17

## 2020-07-10 RX ORDER — HYDROCODONE BITARTRATE AND ACETAMINOPHEN 5; 325 MG/1; MG/1
1 TABLET ORAL 2 TIMES DAILY
Qty: 6 TABLET | Refills: 0 | Status: SHIPPED | OUTPATIENT
Start: 2020-07-10 | End: 2020-10-02 | Stop reason: HOSPADM

## 2020-07-10 RX ORDER — BISACODYL 10 MG
10 SUPPOSITORY, RECTAL RECTAL DAILY PRN
COMMUNITY
End: 2020-07-10 | Stop reason: HOSPADM

## 2020-07-10 RX ORDER — FENTANYL 25 UG/H
1 PATCH TRANSDERMAL
Qty: 5 EACH | Refills: 0 | Status: SHIPPED | OUTPATIENT
Start: 2020-07-10 | End: 2020-10-02 | Stop reason: HOSPADM

## 2020-07-10 RX ADMIN — MEGESTROL ACETATE 400 MG: 40 SUSPENSION ORAL at 08:16

## 2020-07-10 RX ADMIN — SODIUM CHLORIDE, PRESERVATIVE FREE 10 ML: 5 INJECTION INTRAVENOUS at 08:16

## 2020-07-10 RX ADMIN — FLUDROCORTISONE ACETATE 0.1 MG: 0.1 TABLET ORAL at 08:16

## 2020-07-10 RX ADMIN — HYDROCODONE BITARTRATE AND ACETAMINOPHEN 1 TABLET: 5; 325 TABLET ORAL at 08:16

## 2020-07-10 RX ADMIN — TAZOBACTAM SODIUM AND PIPERACILLIN SODIUM 3.38 G: 375; 3 INJECTION, SOLUTION INTRAVENOUS at 08:16

## 2020-07-10 RX ADMIN — TAZOBACTAM SODIUM AND PIPERACILLIN SODIUM 3.38 G: 375; 3 INJECTION, SOLUTION INTRAVENOUS at 00:04

## 2020-07-10 RX ADMIN — POTASSIUM CHLORIDE 40 MEQ: 750 CAPSULE, EXTENDED RELEASE ORAL at 08:16

## 2020-07-10 RX ADMIN — APIXABAN 5 MG: 5 TABLET, FILM COATED ORAL at 08:16

## 2020-07-10 RX ADMIN — FENTANYL 1 PATCH: 25 PATCH TRANSDERMAL at 08:16

## 2020-07-10 RX ADMIN — RISPERIDONE 0.25 MG: 0.25 TABLET, FILM COATED ORAL at 08:16

## 2020-07-10 RX ADMIN — ACETAMINOPHEN 650 MG: 325 TABLET, FILM COATED ORAL at 14:00

## 2020-07-10 RX ADMIN — TAZOBACTAM SODIUM AND PIPERACILLIN SODIUM 3.38 G: 375; 3 INJECTION, SOLUTION INTRAVENOUS at 15:43

## 2020-07-10 NOTE — DISCHARGE SUMMARY
[facility] Medicine Services  DISCHARGE SUMMARY       Date of Admission: 7/7/2020  Date of Discharge:  7/10/2020  Primary Care Physician: Slade Barroso MD    Discharge Diagnoses:  Active Hospital Problems    Diagnosis   • **Sepsis (CMS/HCC)   • Proteus mirabilis infection (UTI)   • Left lower lobe pneumonia possible aspiration   • Debility   • Hypertension   • Bipolar 1 disorder (CMS/HCC)     Nursing home paperwork     • Chronic back pain     per Nursing home paperwork     • Alzheimer disease (CMS/HCC)     Per Nursing home paperwork     In addition to above diagnosis  · Anion gap metabolic acidosis from lactic acidosis  · Mildly elevated troponin felt as type II NSTEMI from sepsis  · Acute hypoxic respiratory failure secondary to concern for aspiration pneumonia with component of hypoventilation  · History of dysphagia with concern for silent aspiration  · Mild leukocytosis  · History of pulmonary embolism in May on chronic anticoagulation  · Contractures    Presenting Problem/History of Present Illness:  Right lower lobe pneumonia [J18.9]         Hospital Course     She is an 80-year-old from nursing home admitted on July 7 for sepsis from the pneumonia suspected aspiration.  She was on Zosyn to which I am changing her on Augmentin.  In addition to pneumonia, I also found that she has Proteus mirabilis urinary tract infection to which she is susceptible to the same antibiotic.  She is doing better in terms of her oxygenation.  It appears to me that she has chronic debility as evidenced by contractures of lower extremities.  Overall she is doing better and felt to be left close to her baseline if not at baseline and medically appropriate for discharge back to skilled nursing facility.  During this hospitalization I spoke to her POA Jonna Cortes who informed me about their goals during this hospitalization which includes treating the infection, no permanent feeding tube,  DNR and DNI and no invasive measures such as heart catheterization.    Note that she had a flat trend of troponin and an EKG that showed normal sinus rhythm with Q waves in lead III and T wave abnormality V3 to V6.  I thought that elevated troponin could be type II NSTEMI from sepsis.    Procedures Performed: None    Consults: None      Pertinent Test Results:   Lab Results (last 72 hours)     Procedure Component Value Units Date/Time    Basic Metabolic Panel [[id number]]  (Abnormal) Collected:  07/10/20 0510    Specimen:  Blood Updated:  07/10/20 0547     Glucose 72 mg/dL      BUN 8 mg/dL      Creatinine 0.58 mg/dL      Sodium 143 mmol/L      Potassium 3.4 mmol/L      Chloride 107 mmol/L      CO2 24.0 mmol/L      Calcium 8.7 mg/dL      eGFR Non African Amer 100 mL/min/1.73      BUN/Creatinine Ratio 13.8     Anion Gap 12.0 mmol/L     Narrative:       GFR Normal >60  Chronic Kidney Disease <60  Kidney Failure <15      Blood Culture With SILAS - Blood, Arm, Left [[id number]] Collected:  07/08/20 0512    Specimen:  Blood from Arm, Left Updated:  07/10/20 0545     Blood Culture No growth at 2 days    CBC & Differential [[id number]] Collected:  07/10/20 0510    Specimen:  Blood Updated:  07/10/20 0534    Narrative:       The following orders were created for panel order CBC & Differential.  Procedure                               Abnormality         Status                     ---------                               -----------         ------                     CBC Auto Differential[[id number]]        Abnormal            Final result                 Please view results for these tests on the individual orders.    CBC Auto Differential [[id number]]  (Abnormal) Collected:  07/10/20 0510    Specimen:  Blood Updated:  07/10/20 0534     WBC 7.61 10*3/mm3      RBC 3.99 10*6/mm3      Hemoglobin 11.8 g/dL      Hematocrit 35.6 %      MCV 89.2 fL      MCH 29.6 pg      MCHC 33.1 g/dL      RDW 14.1 %      RDW-SD 45.7 fl      MPV 9.7 fL       Platelets 157 10*3/mm3      Neutrophil % 74.0 %      Lymphocyte % 18.5 %      Monocyte % 4.1 %      Eosinophil % 1.2 %      Basophil % 0.5 %      Immature Grans % 1.7 %      Neutrophils, Absolute 5.63 10*3/mm3      Lymphocytes, Absolute 1.41 10*3/mm3      Monocytes, Absolute 0.31 10*3/mm3      Eosinophils, Absolute 0.09 10*3/mm3      Basophils, Absolute 0.04 10*3/mm3      Immature Grans, Absolute 0.13 10*3/mm3      nRBC 0.0 /100 WBC     Blood Culture - Blood, Arm, Right [[id number]] Collected:  07/07/20 0020    Specimen:  Blood from Arm, Right Updated:  07/10/20 0100     Blood Culture No growth at 3 days    Urine Culture - Urine, Urine, Catheter [[id number]]  (Abnormal)  (Susceptibility) Collected:  07/07/20 0011    Specimen:  Urine, Catheter Updated:  07/09/20 1032     Urine Culture >100,000 CFU/mL Proteus mirabilis    Susceptibility      Proteus mirabilis     WILLY     Ampicillin Susceptible     Ampicillin + Sulbactam Susceptible     Cefazolin Susceptible     Cefepime Susceptible     Ceftazidime Susceptible     Ceftriaxone Susceptible     Gentamicin Susceptible     Levofloxacin Resistant     Nitrofurantoin Resistant     Piperacillin + Tazobactam Susceptible     Tetracycline Resistant     Trimethoprim + Sulfamethoxazole Resistant                    Basic Metabolic Panel [[id number]]  (Abnormal) Collected:  07/09/20 0540    Specimen:  Blood Updated:  07/09/20 0649     Glucose 90 mg/dL      BUN 14 mg/dL      Creatinine 0.56 mg/dL      Sodium 145 mmol/L      Potassium 3.0 mmol/L      Chloride 114 mmol/L      CO2 23.0 mmol/L      Calcium 8.6 mg/dL      eGFR Non African Amer 104 mL/min/1.73      BUN/Creatinine Ratio 25.0     Anion Gap 8.0 mmol/L     Narrative:       GFR Normal >60  Chronic Kidney Disease <60  Kidney Failure <15      CBC & Differential [[id number]] Collected:  07/09/20 0540    Specimen:  Blood Updated:  07/09/20 0623    Narrative:       The following orders were created for panel order CBC &  Differential.  Procedure                               Abnormality         Status                     ---------                               -----------         ------                     CBC Auto Differential[[id number]]        Abnormal            Final result                 Please view results for these tests on the individual orders.    CBC Auto Differential [[id number]]  (Abnormal) Collected:  07/09/20 0540    Specimen:  Blood Updated:  07/09/20 0623     WBC 11.59 10*3/mm3      RBC 3.57 10*6/mm3      Hemoglobin 10.6 g/dL      Hematocrit 32.8 %      MCV 91.9 fL      MCH 29.7 pg      MCHC 32.3 g/dL      RDW 14.6 %      RDW-SD 49.9 fl      MPV 9.8 fL      Platelets 160 10*3/mm3      Neutrophil % 81.5 %      Lymphocyte % 12.5 %      Monocyte % 3.3 %      Eosinophil % 1.6 %      Basophil % 0.3 %      Immature Grans % 0.8 %      Neutrophils, Absolute 9.46 10*3/mm3      Lymphocytes, Absolute 1.45 10*3/mm3      Monocytes, Absolute 0.38 10*3/mm3      Eosinophils, Absolute 0.18 10*3/mm3      Basophils, Absolute 0.03 10*3/mm3      Immature Grans, Absolute 0.09 10*3/mm3      nRBC 0.0 /100 WBC     Blood Culture - Blood, Arm, Left [[id number]]  (Abnormal) Collected:  07/07/20 0122    Specimen:  Blood from Arm, Left Updated:  07/09/20 0538     Blood Culture Staphylococcus, coagulase negative     Comment: Probable contaminant requires clinical correlation, susceptibility not performed unless requested by physician.          Isolated from Aerobic and Anaerobic Bottles     Gram Stain Anaerobic Bottle Gram positive cocci      Aerobic Bottle Gram positive cocci    POC Glucose Once [[id number]]  (Normal) Collected:  07/08/20 2353    Specimen:  Blood Updated:  07/09/20 0004     Glucose 123 mg/dL      Comment: : [id number] [facility] ID: [id number]       POC Glucose Once [[id number]]  (Normal) Collected:  07/08/20 1703    Specimen:  Blood Updated:  07/08/20 1714     Glucose 102 mg/dL      Comment: : [id number] Jori  Wil ID: [id number]       CBC & Differential [[id number]] Collected:  07/08/20 0509    Specimen:  Blood Updated:  07/08/20 0643    Narrative:       The following orders were created for panel order CBC & Differential.  Procedure                               Abnormality         Status                     ---------                               -----------         ------                     CBC Auto Differential[[id number]]        Abnormal            Final result                 Please view results for these tests on the individual orders.    CBC Auto Differential [[id number]]  (Abnormal) Collected:  07/08/20 0509    Specimen:  Blood Updated:  07/08/20 0643     WBC 13.25 10*3/mm3      RBC 3.48 10*6/mm3      Hemoglobin 10.3 g/dL      Hematocrit 32.0 %      MCV 92.0 fL      MCH 29.6 pg      MCHC 32.2 g/dL      RDW 15.1 %      RDW-SD 51.0 fl      MPV 9.7 fL      Platelets 156 10*3/mm3     Manual Differential [[id number]]  (Abnormal) Collected:  07/08/20 0509    Specimen:  Blood Updated:  07/08/20 0643     Neutrophil % 63.0 %      Lymphocyte % 13.0 %      Monocyte % 2.0 %      Bands %  22.0 %      Neutrophils Absolute 11.26 10*3/mm3      Lymphocytes Absolute 1.72 10*3/mm3      Monocytes Absolute 0.27 10*3/mm3      Anisocytosis Slight/1+     Polychromasia Slight/1+     Toxic Granulation Mod/2+     Platelet Morphology Normal    Basic Metabolic Panel [[id number]]  (Abnormal) Collected:  07/08/20 0509    Specimen:  Blood Updated:  07/08/20 0548     Glucose 107 mg/dL      BUN 18 mg/dL      Creatinine 0.65 mg/dL      Sodium 147 mmol/L      Potassium 3.5 mmol/L      Chloride 115 mmol/L      CO2 22.0 mmol/L      Calcium 8.4 mg/dL      eGFR Non African Amer 87 mL/min/1.73      BUN/Creatinine Ratio 27.7     Anion Gap 10.0 mmol/L     Narrative:       GFR Normal >60  Chronic Kidney Disease <60  Kidney Failure <15      Blood Culture ID, PCR - Blood, Arm, Left [[id number]]  (Abnormal) Collected:  07/07/20 0122    Specimen:  Blood  from Arm, Left Updated:  07/07/20 2046     BCID, PCR Staphylococcus spp, not aureus. Identification by BCID PCR.     BOTTLE TYPE Anaerobic Bottle    Troponin [[id number]]  (Abnormal) Collected:  07/07/20 1542    Specimen:  Blood Updated:  07/07/20 1613     Troponin T 0.123 ng/mL     Narrative:       Troponin T Reference Range:  <= 0.03 ng/mL-   Negative for AMI  >0.03 ng/mL-     Abnormal for myocardial necrosis.  Clinicians would have to utilize clinical acumen, EKG, Troponin and serial changes to determine if it is an Acute Myocardial Infarction or myocardial injury due to an underlying chronic condition.       Results may be falsely decreased if patient taking Biotin.          Imaging Results (Last 7 Days)     Procedure Component Value Units Date/Time    SCANNED - IMAGING [[id number]] Resulted:  07/07/20      Updated:  07/08/20 0657    CT Angiogram Chest [[id number]] Collected:  07/07/20 0732     Updated:  07/07/20 0741    Narrative:       CT chest angiogram dated 7/7/2020 2:00 AM CDT      HISTORY: Acute chest pain     COMPARISON: None.      DLP: 220 mGy cm. Automated exposure control was utilized to diminish  patient radiation dose.     TECHNIQUE: Helical tomographic images of the chest were obtained after  the administration of intravenous contrast following angiogram protocol.  Additionally, 3D MIP reconstructions in the coronal and sagittal planes  were provided. The study is limited secondary to motion artifact.     FINDINGS:    The imaged portion of the base of the neck appears unremarkable.      There is adequate enhancement of the pulmonary arteries to evaluate for  central and segmental pulmonary emboli. No discrete filling defects are  identified to suggest pulmonary emboli although the more distal  subsegmental lower lobe branches, particularly on the left are partially  obscured secondary to motion artifact.. The pulmonary vessels are within  normal limits.      There is patchy atelectasis within both  lungs with left lower lobe  pneumonia. Patchy left lingular and right middle lobe bronchopneumonia  are also present. No pleural effusion is demonstrated.. The trachea and  bronchial tree are patent.      Cardiomegaly is present. There are coronary calcifications.. There is no  pericardial effusion.      No enlarged axillary or mediastinal lymph nodes are present.      The osseous structures of the thorax and surrounding soft tissues  demonstrate no acute process.      A biliary drain is in place with moderate distention of the extrahepatic  bile duct. There is heavy atheromatous calcification of the abdominal  aorta. The adrenals are unremarkable. The patient is status post  cholecystectomy. Mild to moderate intrahepatic biliary dilatation is  present..        Impression:       1. No gross evidence of pulmonary embolus. The study is somewhat limited  secondary to rather extensive motion artifact. This is particularly  noted within the lower lobes.  2. Left lower lobe pneumonia. There is also patchy bronchopneumonia  within the left lingula and right middle lobe. No effusion is present.  3. Cardiomegaly with coronary calcifications. No evidence of pericardial  effusion.  4. No evidence of aneurysm or dissection of the thoracic aorta. There is  atheromatous calcification of the aortic arch and proximal left  subclavian artery..  5. A biliary stent is in place within the upper abdomen not imaged in  its entirety. There is moderate intra and extrahepatic biliary  dilatation present.        This report was finalized on 07/07/2020 07:38 by Dr. Michael Raymundo MD.    XR Chest 1 View [[id number]] Collected:  07/07/20 0727     Updated:  07/07/20 0733    Narrative:       HISTORY: Dyspnea         CXR: A frontal view the chest obtained.     COMPARISON: 03/20/2016     FINDINGS: There are medial left basilar opacities suspicious for  pneumonia. Interstitial densities within the perihilar regions may  represent pneumonitis. The  "cardiac mediastinal contours are normal. No  pleural effusion or pneumothorax. Thoracic aortic calcification.  Arthritic change of the shoulders.     Partial visualization of a stent within the right upper quadrant.       Impression:       1. Medial left basilar consolidation suspicious for pneumonia. Perihilar  interstitial densities may represent pneumonitis or edema.  This report was finalized on 07/07/2020 07:30 by Dr. Karla Cuello MD.          Condition on Discharge:   Stable  Physical Exam on Discharge:  /58 (BP Location: Left arm, Patient Position: Lying)   Pulse 63   Temp 98.4 °F (36.9 °C) (Axillary)   Resp 16   Ht 160 cm (62.99\")   Wt 56.2 kg (124 lb)   SpO2 100%   Breastfeeding No   BMI 21.97 kg/m²   Physical Exam   Resting.  No distress.  Oxygen saturation in room air is 100%  Normocephalic and atraumatic head  anicteric sclera and has pink conjunctiva.  She has diminished inspiratory effort, I could not appreciate any wheezes or crackles  Normocephalic and atraumatic head  No thyromegaly  S1-S2 grossly no murmur, regular heart rate  Soft abdomen, nontender, no gross organomegaly  No cyanosis or clubbing; pedal edema;  contractures of lower extremities         Discharge Disposition:  Skilled Nursing Facility (DC - External)    Discharge Medications:     Discharge Medications      New Medications      Instructions Start Date   amoxicillin-clavulanate 250-62.5 MG/5ML suspension  Commonly known as:  Augmentin   875 mg, Oral, 2 Times Daily         Continue These Medications      Instructions Start Date   acetaminophen 325 MG tablet  Commonly known as:  TYLENOL   650 mg, Oral, Every 4 Hours PRN      apixaban 5 MG tablet tablet  Commonly known as:  ELIQUIS   5 mg, Oral, 2 Times Daily      fentaNYL 25 MCG/HR patch  Commonly known as:  DURAGESIC   1 patch, Transdermal, Every 72 Hours      fludrocortisone 0.1 MG tablet   0.1 mg, Oral, Daily      HYDROcodone-acetaminophen 5-325 MG per " tablet  Commonly known as:  NORCO   1 tablet, Oral, 2 times daily      megestrol acetate 400 MG/10ML suspension oral suspension  Commonly known as:  MEGACE   400 mg, Oral, Daily      risperiDONE 0.25 MG tablet  Commonly known as:  risperDAL   0.25 mg, Oral, 2 Times Daily         Stop These Medications    bisacodyl 10 MG suppository  Commonly known as:  DULCOLAX     haloperidol 1 MG tablet  Commonly known as:  HALDOL     lactulose 10 GM/15ML solution  Commonly known as:  CHRONULAC     magnesium hydroxide 400 MG/5ML suspension  Commonly known as:  MILK OF MAGNESIA     Menthol (Topical Analgesic) 4 % gel     polyethylene glycol 17 g packet  Commonly known as:  MIRALAX     potassium chloride 10 MEQ CR tablet  Commonly known as:  K-DUR,KLOR-CON     SENNOSIDES PO     traZODone 150 MG tablet  Commonly known as:  DESYREL            Discharge Diet:   Diet Instructions     Diet: Dysphagia; Honey Thick Liquids; Pureed      Discharge Diet:  Dysphagia    Fluid Consistency:  Honey Thick Liquids    Pureed Options:  Pureed    Magic cup dietary nutritional supplement.            Activity at Discharge:   Activity Instructions     Activity as Tolerated            Follow-up Appointments:   Nursing home PCP within 24 to 48 hours      Test Results Pending at Discharge:    Order Current Status    Blood Culture - Blood, Arm, Right Preliminary result    Blood Culture With SILAS - Blood, Arm, Left Preliminary result           Kishore Simon MD  07/10/20  14:38    Time: Greater than 30 minutes      Part of this note may be an electronic transcription/translation of spoken language to printed text using the Dragon Dictation System.

## 2020-07-10 NOTE — PROGRESS NOTES
Continued Stay Note   [place]     Patient Name: Zuleyma Ernst  MRN: [id number]  Today's Date: 7/10/2020    Admit Date: 7/7/2020    Discharge Plan     Row Name 07/10/20 0933       Plan    Plan Comments  Pt is able to return to [facility] at discharge. SW will follow and assist with any other discharge needs that may arise.   Phone: [phone]         Fax: [phone]                Discharge Codes    No documentation.             Louisa Monet

## 2020-07-10 NOTE — PLAN OF CARE
Problem: Patient Care Overview  Goal: Plan of Care Review  Outcome: Ongoing (interventions implemented as appropriate)  Note:   VSS, Bp elevated at times; no nonverbal indicators of pain; pillow support provided for contractures, turned Q2, and Z-guard applied to bottom; IV fluids and abx continue; bed alarm set; safety maintained, will continue to monitor      Problem: Patient Care Overview  Goal: Individualization and Mutuality  Outcome: Ongoing (interventions implemented as appropriate)     Problem: Patient Care Overview  Goal: Discharge Needs Assessment  Outcome: Ongoing (interventions implemented as appropriate)     Problem: Patient Care Overview  Goal: Interprofessional Rounds/Family Conf  Outcome: Ongoing (interventions implemented as appropriate)     Problem: Fall Risk (Adult)  Goal: Absence of Fall  Outcome: Ongoing (interventions implemented as appropriate)     Problem: Skin Injury Risk (Adult)  Goal: Skin Health and Integrity  Outcome: Ongoing (interventions implemented as appropriate)     Problem: Pneumonia (Adult)  Goal: Signs and Symptoms of Listed Potential Problems Will be Absent, Minimized or Managed (Pneumonia)  Outcome: Ongoing (interventions implemented as appropriate)     Problem: Sepsis/Septic Shock (Adult)  Goal: Signs and Symptoms of Listed Potential Problems Will be Absent, Minimized or Managed (Sepsis/Septic Shock)  Outcome: Ongoing (interventions implemented as appropriate)

## 2020-07-12 LAB — BACTERIA SPEC AEROBE CULT: NORMAL

## 2020-07-12 NOTE — THERAPY DISCHARGE NOTE
Acute Care - Speech Language Pathology Discharge Summary  [facility]       Patient Name: Zuleyma Ernst  : 1940  MRN: [id number]    Today's Date: 2020                   Admit Date: 2020      SLP Recommendation and Plan  Pureed diet consistency with honey thick liquid consistency. Meds crushed (if safe to be crushed) in pudding/applesauce. 1:1 feeds with staff. RN to monitor for increased lung congestion. If aggressive measures continue to be desired within SNF, pt would benefit from continued SLP services for dysphagia. Thanks! Brenna Costello CCC-SLP 2020 08:39    Visit Dx:    ICD-10-CM ICD-9-CM   1. Oropharyngeal dysphagia R13.12 787.22   2. Pneumonia of left lower lobe due to infectious organism J18.9 486   3. Chronic low back pain, unspecified back pain laterality, unspecified whether sciatica present M54.5 724.2    G89.29 338.29               SLP GOALS     Row Name 20 0838 20 0842          Oral Nutrition/Hydration Goal 1 (SLP)    Oral Nutrition/Hydration Goal 1, SLP  Pt will tolerate least restrictive diet with no overt s/s of aspiration.  -TM  Pt will tolerate least restrictive diet with no overt s/s of aspiration.  -TM     Time Frame (Oral Nutrition/Hydration Goal 1, SLP)  by discharge  -TM  by discharge  -TM     Barriers (Oral Nutrition/Hydration Goal 1, SLP)  Advanced dementia  -TM  Advanced dementia  -TM     Progress/Outcomes (Oral Nutrition/Hydration Goal 1, SLP)  goal not met;discharged from facility  -TM  progress slower than expected  -TM       User Key  (r) = Recorded By, (t) = Taken By, (c) = Cosigned By    Initials Name Provider Type    TM Brenna Costello CCC-SLP Speech and Language Pathologist                  SLP Discharge Summary  Anticipated Dischage Disposition (SLP): skilled nursing facility  Reason for Discharge: discharge from this facility  Progress Toward Achieving Short/long Term Goals: goals not met within established timelines  Discharge Destination:  [facility]      Brenna Costello, CCC-SLP  7/12/2020

## 2020-07-13 LAB — BACTERIA SPEC AEROBE CULT: NORMAL

## 2020-07-30 RX ORDER — HYDROCODONE BITARTRATE AND ACETAMINOPHEN 5; 325 MG/1; MG/1
TABLET ORAL
Qty: 120 TABLET | Refills: 0 | Status: SHIPPED | OUTPATIENT
Start: 2020-07-30 | End: 2020-09-29

## 2020-08-12 RX ORDER — FENTANYL 25 UG/H
PATCH TRANSDERMAL
Qty: 10 PATCH | Refills: 0 | Status: SHIPPED | OUTPATIENT
Start: 2020-08-12 | End: 2020-09-09

## 2020-09-09 RX ORDER — FENTANYL 25 UG/H
PATCH TRANSDERMAL
Qty: 10 PATCH | Refills: 0 | Status: SHIPPED | OUTPATIENT
Start: 2020-09-09 | End: 2020-10-13

## 2020-09-29 RX ORDER — HYDROCODONE BITARTRATE AND ACETAMINOPHEN 5; 325 MG/1; MG/1
TABLET ORAL
Qty: 120 TABLET | Refills: 0 | Status: SHIPPED | OUTPATIENT
Start: 2020-09-29 | End: 2020-10-29

## 2020-10-01 ENCOUNTER — APPOINTMENT (OUTPATIENT)
Dept: GENERAL RADIOLOGY | Facility: HOSPITAL | Age: 80
End: 2020-10-01

## 2020-10-01 ENCOUNTER — APPOINTMENT (OUTPATIENT)
Dept: CARDIOLOGY | Facility: HOSPITAL | Age: 80
End: 2020-10-01

## 2020-10-01 ENCOUNTER — HOSPITAL ENCOUNTER (INPATIENT)
Facility: HOSPITAL | Age: 80
LOS: 1 days | Discharge: NURSING, REHAB OR LONG-TERM CARE | End: 2020-10-02
Attending: EMERGENCY MEDICINE | Admitting: FAMILY MEDICINE

## 2020-10-01 DIAGNOSIS — I26.99 OTHER ACUTE PULMONARY EMBOLISM WITHOUT ACUTE COR PULMONALE (HCC): ICD-10-CM

## 2020-10-01 DIAGNOSIS — I15.9 SECONDARY HYPERTENSION: ICD-10-CM

## 2020-10-01 DIAGNOSIS — J18.9 PNEUMONIA OF RIGHT LOWER LOBE DUE TO INFECTIOUS ORGANISM: Primary | ICD-10-CM

## 2020-10-01 DIAGNOSIS — I48.0 PAROXYSMAL ATRIAL FIBRILLATION WITH RAPID VENTRICULAR RESPONSE (HCC): ICD-10-CM

## 2020-10-01 DIAGNOSIS — R53.81 DEBILITY: ICD-10-CM

## 2020-10-01 DIAGNOSIS — F31.9 BIPOLAR 1 DISORDER (HCC): ICD-10-CM

## 2020-10-01 DIAGNOSIS — F02.80 ALZHEIMER'S DEMENTIA WITHOUT BEHAVIORAL DISTURBANCE, UNSPECIFIED TIMING OF DEMENTIA ONSET: ICD-10-CM

## 2020-10-01 DIAGNOSIS — G30.9 ALZHEIMER'S DEMENTIA WITHOUT BEHAVIORAL DISTURBANCE, UNSPECIFIED TIMING OF DEMENTIA ONSET: ICD-10-CM

## 2020-10-01 DIAGNOSIS — Z86.711 HISTORY OF PULMONARY EMBOLISM: Chronic | ICD-10-CM

## 2020-10-01 DIAGNOSIS — A41.9 SEPSIS, DUE TO UNSPECIFIED ORGANISM, UNSPECIFIED WHETHER ACUTE ORGAN DYSFUNCTION PRESENT (HCC): ICD-10-CM

## 2020-10-01 DIAGNOSIS — G89.29 CHRONIC LOW BACK PAIN, UNSPECIFIED BACK PAIN LATERALITY, UNSPECIFIED WHETHER SCIATICA PRESENT: ICD-10-CM

## 2020-10-01 DIAGNOSIS — J96.01 ACUTE RESPIRATORY FAILURE WITH HYPOXIA (HCC): ICD-10-CM

## 2020-10-01 DIAGNOSIS — F03.91 DEMENTIA WITH BEHAVIORAL DISTURBANCE, UNSPECIFIED DEMENTIA TYPE: ICD-10-CM

## 2020-10-01 DIAGNOSIS — R13.10 DYSPHAGIA, UNSPECIFIED TYPE: ICD-10-CM

## 2020-10-01 DIAGNOSIS — A49.8 PROTEUS MIRABILIS INFECTION: ICD-10-CM

## 2020-10-01 DIAGNOSIS — J18.9 PNEUMONIA OF LEFT LOWER LOBE DUE TO INFECTIOUS ORGANISM: ICD-10-CM

## 2020-10-01 DIAGNOSIS — M54.50 CHRONIC LOW BACK PAIN, UNSPECIFIED BACK PAIN LATERALITY, UNSPECIFIED WHETHER SCIATICA PRESENT: ICD-10-CM

## 2020-10-01 PROBLEM — F03.918 DEMENTIA WITH BEHAVIORAL DISTURBANCE (HCC): Status: ACTIVE | Noted: 2020-10-01

## 2020-10-01 LAB
ALBUMIN SERPL-MCNC: 3.6 G/DL (ref 3.5–5.2)
ALBUMIN/GLOB SERPL: 1.1 G/DL
ALP SERPL-CCNC: 147 U/L (ref 39–117)
ALT SERPL W P-5'-P-CCNC: 32 U/L (ref 1–33)
ANION GAP SERPL CALCULATED.3IONS-SCNC: 18 MMOL/L (ref 5–15)
ANION GAP SERPL CALCULATED.3IONS-SCNC: 20 MMOL/L (ref 5–15)
ARTERIAL PATENCY WRIST A: ABNORMAL
AST SERPL-CCNC: 26 U/L (ref 1–32)
ATMOSPHERIC PRESS: 751 MMHG
BACTERIA UR QL AUTO: ABNORMAL /HPF
BASE EXCESS BLDA CALC-SCNC: -1.1 MMOL/L (ref 0–2)
BDY SITE: ABNORMAL
BH CV ECHO MEAS - AO MAX PG (FULL): 10 MMHG
BH CV ECHO MEAS - AO MAX PG: 15.1 MMHG
BH CV ECHO MEAS - AO MEAN PG (FULL): 0.6 MMHG
BH CV ECHO MEAS - AO MEAN PG: 3.6 MMHG
BH CV ECHO MEAS - AO ROOT AREA (BSA CORRECTED): 1.9
BH CV ECHO MEAS - AO ROOT AREA: 6.6 CM^2
BH CV ECHO MEAS - AO ROOT DIAM: 2.9 CM
BH CV ECHO MEAS - AO V2 MAX: 194 CM/SEC
BH CV ECHO MEAS - AO V2 MEAN: 82.8 CM/SEC
BH CV ECHO MEAS - AO V2 VTI: 21.6 CM
BH CV ECHO MEAS - AVA(I,A): 3.2 CM^2
BH CV ECHO MEAS - AVA(I,D): 3.2 CM^2
BH CV ECHO MEAS - AVA(V,A): 1.8 CM^2
BH CV ECHO MEAS - AVA(V,D): 1.8 CM^2
BH CV ECHO MEAS - BSA(HAYCOCK): 1.5 M^2
BH CV ECHO MEAS - BSA: 1.5 M^2
BH CV ECHO MEAS - BZI_BMI: 20.9 KILOGRAMS/M^2
BH CV ECHO MEAS - BZI_METRIC_HEIGHT: 160 CM
BH CV ECHO MEAS - BZI_METRIC_WEIGHT: 53.5 KG
BH CV ECHO MEAS - EDV(CUBED): 65 ML
BH CV ECHO MEAS - EDV(MOD-SP4): 35.3 ML
BH CV ECHO MEAS - EDV(TEICH): 70.8 ML
BH CV ECHO MEAS - EF(CUBED): 79.5 %
BH CV ECHO MEAS - EF(MOD-SP4): 56.7 %
BH CV ECHO MEAS - EF(TEICH): 72.4 %
BH CV ECHO MEAS - ESV(CUBED): 13.3 ML
BH CV ECHO MEAS - ESV(MOD-SP4): 15.3 ML
BH CV ECHO MEAS - ESV(TEICH): 19.5 ML
BH CV ECHO MEAS - FS: 41 %
BH CV ECHO MEAS - IVS/LVPW: 1.3
BH CV ECHO MEAS - IVSD: 1 CM
BH CV ECHO MEAS - LA DIMENSION: 3.3 CM
BH CV ECHO MEAS - LA/AO: 1.1
BH CV ECHO MEAS - LAT PEAK E' VEL: 5.4 CM/SEC
BH CV ECHO MEAS - LV DIASTOLIC VOL/BSA (35-75): 22.8 ML/M^2
BH CV ECHO MEAS - LV MASS(C)D: 114.2 GRAMS
BH CV ECHO MEAS - LV MASS(C)DI: 73.9 GRAMS/M^2
BH CV ECHO MEAS - LV MAX PG: 5 MMHG
BH CV ECHO MEAS - LV MEAN PG: 3 MMHG
BH CV ECHO MEAS - LV SYSTOLIC VOL/BSA (12-30): 9.9 ML/M^2
BH CV ECHO MEAS - LV V1 MAX: 111.5 CM/SEC
BH CV ECHO MEAS - LV V1 MEAN: 79.8 CM/SEC
BH CV ECHO MEAS - LV V1 VTI: 22.2 CM
BH CV ECHO MEAS - LVIDD: 4 CM
BH CV ECHO MEAS - LVIDS: 2.4 CM
BH CV ECHO MEAS - LVLD AP4: 6 CM
BH CV ECHO MEAS - LVLS AP4: 5.1 CM
BH CV ECHO MEAS - LVOT AREA (M): 3.1 CM^2
BH CV ECHO MEAS - LVOT AREA: 3.1 CM^2
BH CV ECHO MEAS - LVOT DIAM: 2 CM
BH CV ECHO MEAS - LVPWD: 0.8 CM
BH CV ECHO MEAS - MED PEAK E' VEL: 4.24 CM/SEC
BH CV ECHO MEAS - MV A MAX VEL: 84.9 CM/SEC
BH CV ECHO MEAS - MV DEC TIME: 0.21 SEC
BH CV ECHO MEAS - MV E MAX VEL: 84.9 CM/SEC
BH CV ECHO MEAS - MV E/A: 1
BH CV ECHO MEAS - PA MAX PG: 2 MMHG
BH CV ECHO MEAS - PA V2 MAX: 70.3 CM/SEC
BH CV ECHO MEAS - RAP SYSTOLE: 10 MMHG
BH CV ECHO MEAS - RVSP: 41.1 MMHG
BH CV ECHO MEAS - SI(AO): 92.2 ML/M^2
BH CV ECHO MEAS - SI(CUBED): 33.4 ML/M^2
BH CV ECHO MEAS - SI(LVOT): 45.1 ML/M^2
BH CV ECHO MEAS - SI(MOD-SP4): 12.9 ML/M^2
BH CV ECHO MEAS - SI(TEICH): 33.2 ML/M^2
BH CV ECHO MEAS - SV(AO): 142.4 ML
BH CV ECHO MEAS - SV(CUBED): 51.7 ML
BH CV ECHO MEAS - SV(LVOT): 69.7 ML
BH CV ECHO MEAS - SV(MOD-SP4): 20 ML
BH CV ECHO MEAS - SV(TEICH): 51.3 ML
BH CV ECHO MEAS - TR MAX VEL: 279 CM/SEC
BH CV ECHO MEASUREMENTS AVERAGE E/E' RATIO: 17.61
BILIRUB SERPL-MCNC: 1.1 MG/DL (ref 0–1.2)
BILIRUB UR QL STRIP: ABNORMAL
BODY TEMPERATURE: 37 C
BUN SERPL-MCNC: 18 MG/DL (ref 8–23)
BUN SERPL-MCNC: 23 MG/DL (ref 8–23)
BUN/CREAT SERPL: 31 (ref 7–25)
BUN/CREAT SERPL: 33.3 (ref 7–25)
CALCIUM SPEC-SCNC: 8.3 MG/DL (ref 8.6–10.5)
CALCIUM SPEC-SCNC: 9.5 MG/DL (ref 8.6–10.5)
CHLORIDE SERPL-SCNC: 107 MMOL/L (ref 98–107)
CHLORIDE SERPL-SCNC: 112 MMOL/L (ref 98–107)
CLARITY UR: CLEAR
CO2 SERPL-SCNC: 19 MMOL/L (ref 22–29)
CO2 SERPL-SCNC: 22 MMOL/L (ref 22–29)
COLOR UR: ABNORMAL
CREAT SERPL-MCNC: 0.58 MG/DL (ref 0.57–1)
CREAT SERPL-MCNC: 0.69 MG/DL (ref 0.57–1)
D DIMER PPP FEU-MCNC: 0.75 MG/L (FEU) (ref 0–0.5)
D-LACTATE SERPL-SCNC: 6.9 MMOL/L (ref 0.5–2)
D-LACTATE SERPL-SCNC: 8.9 MMOL/L (ref 0.5–2)
DEPRECATED RDW RBC AUTO: 49 FL (ref 37–54)
DEPRECATED RDW RBC AUTO: 49.8 FL (ref 37–54)
EOSINOPHIL # BLD MANUAL: 0.05 10*3/MM3 (ref 0–0.4)
EOSINOPHIL # BLD MANUAL: 0.09 10*3/MM3 (ref 0–0.4)
EOSINOPHIL NFR BLD MANUAL: 1 % (ref 0.3–6.2)
EOSINOPHIL NFR BLD MANUAL: 2 % (ref 0.3–6.2)
ERYTHROCYTE [DISTWIDTH] IN BLOOD BY AUTOMATED COUNT: 14.5 % (ref 12.3–15.4)
ERYTHROCYTE [DISTWIDTH] IN BLOOD BY AUTOMATED COUNT: 14.6 % (ref 12.3–15.4)
GAS FLOW AIRWAY: 4 LPM
GFR SERPL CREATININE-BSD FRML MDRD: 100 ML/MIN/1.73
GFR SERPL CREATININE-BSD FRML MDRD: 81 ML/MIN/1.73
GIANT PLATELETS: ABNORMAL
GLOBULIN UR ELPH-MCNC: 3.4 GM/DL
GLUCOSE BLDC GLUCOMTR-MCNC: 176 MG/DL (ref 70–130)
GLUCOSE BLDC GLUCOMTR-MCNC: 191 MG/DL (ref 70–130)
GLUCOSE BLDC GLUCOMTR-MCNC: 206 MG/DL (ref 70–130)
GLUCOSE SERPL-MCNC: 213 MG/DL (ref 65–99)
GLUCOSE SERPL-MCNC: 227 MG/DL (ref 65–99)
GLUCOSE UR STRIP-MCNC: ABNORMAL MG/DL
HBA1C MFR BLD: 6.4 % (ref 4.8–5.6)
HCO3 BLDA-SCNC: 20.1 MMOL/L (ref 20–26)
HCT VFR BLD AUTO: 37.3 % (ref 34–46.6)
HCT VFR BLD AUTO: 43.9 % (ref 34–46.6)
HGB BLD-MCNC: 11.6 G/DL (ref 12–15.9)
HGB BLD-MCNC: 14.1 G/DL (ref 12–15.9)
HGB UR QL STRIP.AUTO: ABNORMAL
HYALINE CASTS UR QL AUTO: ABNORMAL /LPF
KETONES UR QL STRIP: ABNORMAL
LACTATE HOLD SPECIMEN: NORMAL
LEFT ATRIUM VOLUME INDEX: 30.8 ML/M2
LEFT ATRIUM VOLUME: 47.7 CM3
LEUKOCYTE ESTERASE UR QL STRIP.AUTO: ABNORMAL
LYMPHOCYTES # BLD MANUAL: 0.55 10*3/MM3 (ref 0.7–3.1)
LYMPHOCYTES # BLD MANUAL: 1.53 10*3/MM3 (ref 0.7–3.1)
LYMPHOCYTES NFR BLD MANUAL: 11.1 % (ref 19.6–45.3)
LYMPHOCYTES NFR BLD MANUAL: 35 % (ref 19.6–45.3)
LYMPHOCYTES NFR BLD MANUAL: 4 % (ref 5–12)
LYMPHOCYTES NFR BLD MANUAL: 4 % (ref 5–12)
Lab: ABNORMAL
Lab: ABNORMAL
MAGNESIUM SERPL-MCNC: 2.2 MG/DL (ref 1.6–2.4)
MAXIMAL PREDICTED HEART RATE: 138 BPM
MCH RBC QN AUTO: 29.1 PG (ref 26.6–33)
MCH RBC QN AUTO: 29.7 PG (ref 26.6–33)
MCHC RBC AUTO-ENTMCNC: 31.1 G/DL (ref 31.5–35.7)
MCHC RBC AUTO-ENTMCNC: 32.1 G/DL (ref 31.5–35.7)
MCV RBC AUTO: 92.4 FL (ref 79–97)
MCV RBC AUTO: 93.7 FL (ref 79–97)
METAMYELOCYTES NFR BLD MANUAL: 1 % (ref 0–0)
MODALITY: ABNORMAL
MONOCYTES # BLD AUTO: 0.18 10*3/MM3 (ref 0.1–0.9)
MONOCYTES # BLD AUTO: 0.2 10*3/MM3 (ref 0.1–0.9)
NEUTROPHILS # BLD AUTO: 2.58 10*3/MM3 (ref 1.7–7)
NEUTROPHILS # BLD AUTO: 4.12 10*3/MM3 (ref 1.7–7)
NEUTROPHILS NFR BLD MANUAL: 48 % (ref 42.7–76)
NEUTROPHILS NFR BLD MANUAL: 54.5 % (ref 42.7–76)
NEUTS BAND NFR BLD MANUAL: 11 % (ref 0–5)
NEUTS BAND NFR BLD MANUAL: 28.3 % (ref 0–5)
NITRITE UR QL STRIP: NEGATIVE
NOTIFIED BY: ABNORMAL
NOTIFIED WHO: ABNORMAL
NRBC SPEC MANUAL: 1 /100 WBC (ref 0–0.2)
NT-PROBNP SERPL-MCNC: 1507 PG/ML (ref 0–1800)
PCO2 BLDA: 24.5 MM HG (ref 35–45)
PCO2 TEMP ADJ BLD: 24.5 MM HG (ref 35–45)
PH BLDA: 7.52 PH UNITS (ref 7.35–7.45)
PH UR STRIP.AUTO: 6.5 [PH] (ref 5–8)
PH, TEMP CORRECTED: 7.52 PH UNITS (ref 7.35–7.45)
PHOSPHATE SERPL-MCNC: 2.8 MG/DL (ref 2.5–4.5)
PLATELET # BLD AUTO: 151 10*3/MM3 (ref 140–450)
PLATELET # BLD AUTO: 183 10*3/MM3 (ref 140–450)
PMV BLD AUTO: 10.5 FL (ref 6–12)
PMV BLD AUTO: 9.8 FL (ref 6–12)
PO2 BLDA: 49.9 MM HG (ref 83–108)
PO2 TEMP ADJ BLD: 49.9 MM HG (ref 83–108)
POIKILOCYTOSIS BLD QL SMEAR: ABNORMAL
POLYCHROMASIA BLD QL SMEAR: ABNORMAL
POTASSIUM SERPL-SCNC: 3.6 MMOL/L (ref 3.5–5.2)
POTASSIUM SERPL-SCNC: 3.8 MMOL/L (ref 3.5–5.2)
PROT SERPL-MCNC: 7 G/DL (ref 6–8.5)
PROT UR QL STRIP: ABNORMAL
RBC # BLD AUTO: 3.98 10*6/MM3 (ref 3.77–5.28)
RBC # BLD AUTO: 4.75 10*6/MM3 (ref 3.77–5.28)
RBC # UR: ABNORMAL /HPF
RBC MORPH BLD: NORMAL
REF LAB TEST METHOD: ABNORMAL
SAO2 % BLDCOA: 89.9 % (ref 94–99)
SARS-COV-2 RDRP RESP QL NAA+PROBE: NOT DETECTED
SMALL PLATELETS BLD QL SMEAR: ADEQUATE
SODIUM SERPL-SCNC: 149 MMOL/L (ref 136–145)
SODIUM SERPL-SCNC: 149 MMOL/L (ref 136–145)
SP GR UR STRIP: >=1.03 (ref 1–1.03)
SQUAMOUS #/AREA URNS HPF: ABNORMAL /HPF
STRESS TARGET HR: 117 BPM
T4 FREE SERPL-MCNC: 1.11 NG/DL (ref 0.93–1.7)
TROPONIN T SERPL-MCNC: 0.08 NG/ML (ref 0–0.03)
TROPONIN T SERPL-MCNC: 0.12 NG/ML (ref 0–0.03)
TSH SERPL DL<=0.05 MIU/L-ACNC: 2.45 UIU/ML (ref 0.27–4.2)
UROBILINOGEN UR QL STRIP: ABNORMAL
VENTILATOR MODE: ABNORMAL
WBC # BLD AUTO: 4.38 10*3/MM3 (ref 3.4–10.8)
WBC # BLD AUTO: 4.98 10*3/MM3 (ref 3.4–10.8)
WBC MORPH BLD: NORMAL
WBC MORPH BLD: NORMAL
WBC UR QL AUTO: ABNORMAL /HPF

## 2020-10-01 PROCEDURE — 83036 HEMOGLOBIN GLYCOSYLATED A1C: CPT | Performed by: INTERNAL MEDICINE

## 2020-10-01 PROCEDURE — 93005 ELECTROCARDIOGRAM TRACING: CPT | Performed by: EMERGENCY MEDICINE

## 2020-10-01 PROCEDURE — 81001 URINALYSIS AUTO W/SCOPE: CPT | Performed by: EMERGENCY MEDICINE

## 2020-10-01 PROCEDURE — 84439 ASSAY OF FREE THYROXINE: CPT | Performed by: INTERNAL MEDICINE

## 2020-10-01 PROCEDURE — 25010000002 AZITHROMYCIN PER 500 MG: Performed by: EMERGENCY MEDICINE

## 2020-10-01 PROCEDURE — 36600 WITHDRAWAL OF ARTERIAL BLOOD: CPT

## 2020-10-01 PROCEDURE — 93356 MYOCRD STRAIN IMG SPCKL TRCK: CPT | Performed by: INTERNAL MEDICINE

## 2020-10-01 PROCEDURE — P9612 CATHETERIZE FOR URINE SPEC: HCPCS

## 2020-10-01 PROCEDURE — 84100 ASSAY OF PHOSPHORUS: CPT | Performed by: INTERNAL MEDICINE

## 2020-10-01 PROCEDURE — 87150 DNA/RNA AMPLIFIED PROBE: CPT | Performed by: EMERGENCY MEDICINE

## 2020-10-01 PROCEDURE — 71045 X-RAY EXAM CHEST 1 VIEW: CPT

## 2020-10-01 PROCEDURE — 99285 EMERGENCY DEPT VISIT HI MDM: CPT

## 2020-10-01 PROCEDURE — 83735 ASSAY OF MAGNESIUM: CPT | Performed by: INTERNAL MEDICINE

## 2020-10-01 PROCEDURE — 93005 ELECTROCARDIOGRAM TRACING: CPT | Performed by: INTERNAL MEDICINE

## 2020-10-01 PROCEDURE — 94799 UNLISTED PULMONARY SVC/PX: CPT

## 2020-10-01 PROCEDURE — 87040 BLOOD CULTURE FOR BACTERIA: CPT | Performed by: EMERGENCY MEDICINE

## 2020-10-01 PROCEDURE — 83880 ASSAY OF NATRIURETIC PEPTIDE: CPT | Performed by: EMERGENCY MEDICINE

## 2020-10-01 PROCEDURE — 92610 EVALUATE SWALLOWING FUNCTION: CPT

## 2020-10-01 PROCEDURE — 85025 COMPLETE CBC W/AUTO DIFF WBC: CPT | Performed by: EMERGENCY MEDICINE

## 2020-10-01 PROCEDURE — 94640 AIRWAY INHALATION TREATMENT: CPT

## 2020-10-01 PROCEDURE — 84484 ASSAY OF TROPONIN QUANT: CPT | Performed by: EMERGENCY MEDICINE

## 2020-10-01 PROCEDURE — 85007 BL SMEAR W/DIFF WBC COUNT: CPT | Performed by: INTERNAL MEDICINE

## 2020-10-01 PROCEDURE — 93356 MYOCRD STRAIN IMG SPCKL TRCK: CPT

## 2020-10-01 PROCEDURE — 87147 CULTURE TYPE IMMUNOLOGIC: CPT | Performed by: EMERGENCY MEDICINE

## 2020-10-01 PROCEDURE — 82803 BLOOD GASES ANY COMBINATION: CPT

## 2020-10-01 PROCEDURE — 25010000002 VANCOMYCIN PER 500 MG: Performed by: INTERNAL MEDICINE

## 2020-10-01 PROCEDURE — 25010000002 CEFTRIAXONE PER 250 MG: Performed by: EMERGENCY MEDICINE

## 2020-10-01 PROCEDURE — 87635 SARS-COV-2 COVID-19 AMP PRB: CPT | Performed by: EMERGENCY MEDICINE

## 2020-10-01 PROCEDURE — 85379 FIBRIN DEGRADATION QUANT: CPT | Performed by: EMERGENCY MEDICINE

## 2020-10-01 PROCEDURE — 93010 ELECTROCARDIOGRAM REPORT: CPT | Performed by: INTERNAL MEDICINE

## 2020-10-01 PROCEDURE — 85025 COMPLETE CBC W/AUTO DIFF WBC: CPT | Performed by: INTERNAL MEDICINE

## 2020-10-01 PROCEDURE — 25010000002 LORAZEPAM PER 2 MG: Performed by: EMERGENCY MEDICINE

## 2020-10-01 PROCEDURE — 83605 ASSAY OF LACTIC ACID: CPT | Performed by: EMERGENCY MEDICINE

## 2020-10-01 PROCEDURE — 25010000002 PIPERACILLIN SOD-TAZOBACTAM PER 1 G: Performed by: INTERNAL MEDICINE

## 2020-10-01 PROCEDURE — 93306 TTE W/DOPPLER COMPLETE: CPT

## 2020-10-01 PROCEDURE — 93306 TTE W/DOPPLER COMPLETE: CPT | Performed by: INTERNAL MEDICINE

## 2020-10-01 PROCEDURE — 84484 ASSAY OF TROPONIN QUANT: CPT | Performed by: INTERNAL MEDICINE

## 2020-10-01 PROCEDURE — 99222 1ST HOSP IP/OBS MODERATE 55: CPT | Performed by: CLINICAL NURSE SPECIALIST

## 2020-10-01 PROCEDURE — 36415 COLL VENOUS BLD VENIPUNCTURE: CPT

## 2020-10-01 PROCEDURE — 82962 GLUCOSE BLOOD TEST: CPT

## 2020-10-01 PROCEDURE — 80053 COMPREHEN METABOLIC PANEL: CPT | Performed by: EMERGENCY MEDICINE

## 2020-10-01 PROCEDURE — 25010000002 ENOXAPARIN PER 10 MG: Performed by: FAMILY MEDICINE

## 2020-10-01 PROCEDURE — 84443 ASSAY THYROID STIM HORMONE: CPT | Performed by: INTERNAL MEDICINE

## 2020-10-01 PROCEDURE — 85007 BL SMEAR W/DIFF WBC COUNT: CPT | Performed by: EMERGENCY MEDICINE

## 2020-10-01 RX ORDER — ONDANSETRON 4 MG/1
4 TABLET, FILM COATED ORAL EVERY 6 HOURS PRN
Status: DISCONTINUED | OUTPATIENT
Start: 2020-10-01 | End: 2020-10-02 | Stop reason: HOSPADM

## 2020-10-01 RX ORDER — ACETAMINOPHEN 325 MG/1
650 TABLET ORAL EVERY 4 HOURS PRN
Status: DISCONTINUED | OUTPATIENT
Start: 2020-10-01 | End: 2020-10-01 | Stop reason: SDUPTHER

## 2020-10-01 RX ORDER — GUAIFENESIN 600 MG/1
1200 TABLET, EXTENDED RELEASE ORAL EVERY 12 HOURS SCHEDULED
Status: DISCONTINUED | OUTPATIENT
Start: 2020-10-01 | End: 2020-10-01

## 2020-10-01 RX ORDER — NITROGLYCERIN 0.4 MG/1
0.4 TABLET SUBLINGUAL
Status: DISCONTINUED | OUTPATIENT
Start: 2020-10-01 | End: 2020-10-02 | Stop reason: HOSPADM

## 2020-10-01 RX ORDER — SODIUM CHLORIDE 450 MG/100ML
75 INJECTION, SOLUTION INTRAVENOUS CONTINUOUS
Status: DISCONTINUED | OUTPATIENT
Start: 2020-10-01 | End: 2020-10-02

## 2020-10-01 RX ORDER — DEXTROSE MONOHYDRATE 25 G/50ML
25 INJECTION, SOLUTION INTRAVENOUS
Status: DISCONTINUED | OUTPATIENT
Start: 2020-10-01 | End: 2020-10-02

## 2020-10-01 RX ORDER — VANCOMYCIN HYDROCHLORIDE 1 G/200ML
1000 INJECTION, SOLUTION INTRAVENOUS EVERY 24 HOURS
Status: DISCONTINUED | OUTPATIENT
Start: 2020-10-01 | End: 2020-10-02

## 2020-10-01 RX ORDER — ACETAMINOPHEN 325 MG/1
650 TABLET ORAL EVERY 4 HOURS PRN
Status: DISCONTINUED | OUTPATIENT
Start: 2020-10-01 | End: 2020-10-02 | Stop reason: HOSPADM

## 2020-10-01 RX ORDER — ALBUTEROL SULFATE 2.5 MG/3ML
2.5 SOLUTION RESPIRATORY (INHALATION) ONCE AS NEEDED
Status: DISCONTINUED | OUTPATIENT
Start: 2020-10-01 | End: 2020-10-02 | Stop reason: HOSPADM

## 2020-10-01 RX ORDER — HYDROCODONE BITARTRATE AND ACETAMINOPHEN 5; 325 MG/1; MG/1
1 TABLET ORAL 2 TIMES DAILY
Status: DISCONTINUED | OUTPATIENT
Start: 2020-10-01 | End: 2020-10-01

## 2020-10-01 RX ORDER — SODIUM CHLORIDE 0.9 % (FLUSH) 0.9 %
10 SYRINGE (ML) INJECTION AS NEEDED
Status: DISCONTINUED | OUTPATIENT
Start: 2020-10-01 | End: 2020-10-02 | Stop reason: HOSPADM

## 2020-10-01 RX ORDER — ONDANSETRON 2 MG/ML
4 INJECTION INTRAMUSCULAR; INTRAVENOUS EVERY 6 HOURS PRN
Status: DISCONTINUED | OUTPATIENT
Start: 2020-10-01 | End: 2020-10-02 | Stop reason: HOSPADM

## 2020-10-01 RX ORDER — VANCOMYCIN HYDROCHLORIDE 1 G/200ML
20 INJECTION, SOLUTION INTRAVENOUS ONCE
Status: DISCONTINUED | OUTPATIENT
Start: 2020-10-01 | End: 2020-10-01 | Stop reason: SDUPTHER

## 2020-10-01 RX ORDER — SODIUM CHLORIDE 0.9 % (FLUSH) 0.9 %
10 SYRINGE (ML) INJECTION EVERY 12 HOURS SCHEDULED
Status: DISCONTINUED | OUTPATIENT
Start: 2020-10-01 | End: 2020-10-02 | Stop reason: HOSPADM

## 2020-10-01 RX ORDER — NICOTINE POLACRILEX 4 MG
15 LOZENGE BUCCAL
Status: DISCONTINUED | OUTPATIENT
Start: 2020-10-01 | End: 2020-10-02

## 2020-10-01 RX ORDER — IPRATROPIUM BROMIDE AND ALBUTEROL SULFATE 2.5; .5 MG/3ML; MG/3ML
3 SOLUTION RESPIRATORY (INHALATION)
Status: DISCONTINUED | OUTPATIENT
Start: 2020-10-01 | End: 2020-10-02

## 2020-10-01 RX ORDER — LORAZEPAM 2 MG/ML
1 INJECTION INTRAMUSCULAR ONCE
Status: COMPLETED | OUTPATIENT
Start: 2020-10-01 | End: 2020-10-01

## 2020-10-01 RX ORDER — SODIUM CHLORIDE 9 MG/ML
100 INJECTION, SOLUTION INTRAVENOUS CONTINUOUS
Status: DISCONTINUED | OUTPATIENT
Start: 2020-10-01 | End: 2020-10-01

## 2020-10-01 RX ORDER — FLUDROCORTISONE ACETATE 0.1 MG/1
0.1 TABLET ORAL DAILY
Status: DISCONTINUED | OUTPATIENT
Start: 2020-10-01 | End: 2020-10-01

## 2020-10-01 RX ORDER — ACETAMINOPHEN 650 MG/1
650 SUPPOSITORY RECTAL EVERY 4 HOURS PRN
Status: DISCONTINUED | OUTPATIENT
Start: 2020-10-01 | End: 2020-10-02 | Stop reason: HOSPADM

## 2020-10-01 RX ORDER — FAMOTIDINE 10 MG/ML
20 INJECTION, SOLUTION INTRAVENOUS DAILY
Status: DISCONTINUED | OUTPATIENT
Start: 2020-10-01 | End: 2020-10-02

## 2020-10-01 RX ORDER — FENTANYL 25 UG/H
1 PATCH TRANSDERMAL
Status: DISCONTINUED | OUTPATIENT
Start: 2020-10-02 | End: 2020-10-01

## 2020-10-01 RX ORDER — RISPERIDONE 0.25 MG/1
0.25 TABLET ORAL 2 TIMES DAILY PRN
Status: DISCONTINUED | OUTPATIENT
Start: 2020-10-01 | End: 2020-10-01

## 2020-10-01 RX ORDER — ACETAMINOPHEN 160 MG/5ML
650 SOLUTION ORAL EVERY 4 HOURS PRN
Status: DISCONTINUED | OUTPATIENT
Start: 2020-10-01 | End: 2020-10-02 | Stop reason: HOSPADM

## 2020-10-01 RX ADMIN — TAZOBACTAM SODIUM AND PIPERACILLIN SODIUM 3.38 G: 375; 3 INJECTION, SOLUTION INTRAVENOUS at 06:40

## 2020-10-01 RX ADMIN — TAZOBACTAM SODIUM AND PIPERACILLIN SODIUM 3.38 G: 375; 3 INJECTION, SOLUTION INTRAVENOUS at 20:57

## 2020-10-01 RX ADMIN — ENOXAPARIN SODIUM 50 MG: 60 INJECTION SUBCUTANEOUS at 14:43

## 2020-10-01 RX ADMIN — SODIUM CHLORIDE 75 ML/HR: 4.5 INJECTION, SOLUTION INTRAVENOUS at 12:55

## 2020-10-01 RX ADMIN — LORAZEPAM 1 MG: 2 INJECTION INTRAMUSCULAR; INTRAVENOUS at 03:22

## 2020-10-01 RX ADMIN — AZITHROMYCIN 500 MG: 500 INJECTION, POWDER, LYOPHILIZED, FOR SOLUTION INTRAVENOUS at 04:23

## 2020-10-01 RX ADMIN — IPRATROPIUM BROMIDE AND ALBUTEROL SULFATE 3 ML: 2.5; .5 SOLUTION RESPIRATORY (INHALATION) at 07:29

## 2020-10-01 RX ADMIN — SODIUM CHLORIDE 1000 ML: 9 INJECTION, SOLUTION INTRAVENOUS at 02:42

## 2020-10-01 RX ADMIN — VANCOMYCIN HYDROCHLORIDE 1000 MG: 1 INJECTION, SOLUTION INTRAVENOUS at 12:49

## 2020-10-01 RX ADMIN — DILTIAZEM HYDROCHLORIDE 5 MG/HR: 5 INJECTION INTRAVENOUS at 02:21

## 2020-10-01 RX ADMIN — CEFTRIAXONE SODIUM 1 G: 1 INJECTION, POWDER, FOR SOLUTION INTRAMUSCULAR; INTRAVENOUS at 03:15

## 2020-10-01 RX ADMIN — FAMOTIDINE 20 MG: 10 INJECTION INTRAVENOUS at 12:54

## 2020-10-01 RX ADMIN — SODIUM CHLORIDE, PRESERVATIVE FREE 10 ML: 5 INJECTION INTRAVENOUS at 20:58

## 2020-10-01 RX ADMIN — SODIUM CHLORIDE 100 ML/HR: 9 INJECTION, SOLUTION INTRAVENOUS at 05:41

## 2020-10-01 RX ADMIN — IPRATROPIUM BROMIDE AND ALBUTEROL SULFATE 3 ML: 2.5; .5 SOLUTION RESPIRATORY (INHALATION) at 21:14

## 2020-10-01 RX ADMIN — TAZOBACTAM SODIUM AND PIPERACILLIN SODIUM 3.38 G: 375; 3 INJECTION, SOLUTION INTRAVENOUS at 14:43

## 2020-10-01 RX ADMIN — SODIUM CHLORIDE 1602 ML: 9 INJECTION, SOLUTION INTRAVENOUS at 03:16

## 2020-10-01 RX ADMIN — IPRATROPIUM BROMIDE AND ALBUTEROL SULFATE 3 ML: 2.5; .5 SOLUTION RESPIRATORY (INHALATION) at 11:24

## 2020-10-01 RX ADMIN — ENOXAPARIN SODIUM 50 MG: 60 INJECTION SUBCUTANEOUS at 20:57

## 2020-10-01 NOTE — PLAN OF CARE
Problem: Adult Inpatient Plan of Care  Goal: Plan of Care Review  10/1/2020 1649 by Dolly Trotter RN  Outcome: Ongoing, Progressing  Flowsheets (Taken 10/1/2020 1649)  Plan of Care Reviewed With: durable power of   Outcome Summary: Pt responds to pain and movement, continues on IV abx, remains NPO for failed dysphagia screening, VSS, bed alarm set, will continue to monitor.   Goal Outcome Evaluation:  Plan of Care Reviewed With: durable power of   Progress: no change  Outcome Summary: Pt

## 2020-10-01 NOTE — ED PROVIDER NOTES
Subjective   Patient is brought to the emergency room by ambulance from the nursing home with a report from the nursing home that the patient has been increasingly short of breath all day.  They says she is having more more difficulty breathing.  They finally called EMS tonight.  Paramedics say when they got there the patient's pulse ox was in the 85 to 86% range on room air.  They put her on 3 L and got her oxygen level above the 90% range on pulse ox.  There is no report of fever or chills or vomiting or diarrhea.      History provided by:  Medical records, nursing home and EMS personnel  History limited by:  Dementia   used: No    Shortness of Breath  Severity:  Moderate  Onset quality:  Gradual  Duration:  1 day  Timing:  Constant  Progression:  Worsening  Chronicity:  New  Context: not activity, not animal exposure, not emotional upset, not fumes, not known allergens, not occupational exposure, not pollens, not smoke exposure, not strong odors, not URI and not weather changes    Relieved by:  Nothing  Worsened by:  Nothing  Ineffective treatments:  None tried  Associated symptoms: no abdominal pain, no chest pain, no claudication, no cough, no diaphoresis, no ear pain, no fever, no headaches, no hemoptysis, no neck pain, no PND, no rash, no sore throat, no sputum production, no syncope, no swollen glands, no vomiting and no wheezing    Risk factors: no recent alcohol use, no family hx of DVT, no hx of cancer, no hx of PE/DVT, no obesity, no oral contraceptive use, no prolonged immobilization, no recent surgery and no tobacco use        Review of Systems   Constitutional: Negative.  Negative for diaphoresis and fever.   HENT: Negative.  Negative for ear pain and sore throat.    Respiratory: Positive for shortness of breath. Negative for cough, hemoptysis, sputum production and wheezing.    Cardiovascular: Negative.  Negative for chest pain, claudication, syncope and PND.   Gastrointestinal:  Negative.  Negative for abdominal pain and vomiting.   Genitourinary: Negative.    Musculoskeletal: Negative.  Negative for neck pain.   Skin: Negative.  Negative for rash.   Neurological: Negative.  Negative for headaches.   Psychiatric/Behavioral: Negative.    All other systems reviewed and are negative.      Past Medical History:   Diagnosis Date   • Alzheimer disease (CMS/[facility])     Per Nursing home paperwork   • Alzheimer's dementia (CMS/[facility])    • Anxiety disorder     per Nursing home paperwork   • Arthritis    • Bipolar 1 disorder (CMS/[facility])     Nursing home paperwork   • Chronic back pain     per Nursing home paperwork   • Constipation     Per nursing home paperwork   • Dementia (CMS/[facility])     per nursing home paperwork   • Dysphagia    • Hyperlipidemia    • Hypertension    • Major depressive disorder     per Nursing home paperwork   • Osteoarthritis     per nursing home paperwork.   • Pulmonary air embolism (CMS/[facility])     Per nursing home paperwork       Allergies   Allergen Reactions   • Sulfa Antibiotics Unknown - High Severity   • Ultram [Tramadol Hcl] Unknown - High Severity       History reviewed. No pertinent surgical history.    History reviewed. No pertinent family history.    Social History     Socioeconomic History   • Marital status:      Spouse name: Not on file   • Number of children: Not on file   • Years of education: Not on file   • Highest education level: Not on file   Tobacco Use   • Smoking status: Never Smoker   • Smokeless tobacco: Never Used   Substance and Sexual Activity   • Alcohol use: Not Currently   • Drug use: Not Currently   • Sexual activity: Defer       Prior to Admission medications    Medication Sig Start Date End Date Taking? Authorizing Provider   acetaminophen (TYLENOL) 325 MG tablet Take 650 mg by mouth Every 4 (Four) Hours As Needed for Mild Pain  or Fever.    Provider, MD Jose   apixaban (ELIQUIS) 5 MG tablet tablet Take 5 mg by mouth 2 (Two) Times a Day.     ProviderJose MD   fentaNYL (DURAGESIC) 25 MCG/HR patch Place 1 patch on the skin as directed by provider Every 72 (Seventy-Two) Hours. 7/10/20   Kishore Simon MD   fludrocortisone 0.1 MG tablet Take 0.1 mg by mouth Daily.    Jose Harp MD   HYDROcodone-acetaminophen (NORCO) 5-325 MG per tablet Take 1 tablet by mouth 2 (two) times a day. 7/10/20   Kishore Simon MD   megestrol acetate (MEGACE) 400 MG/10ML suspension oral suspension Take 400 mg by mouth Daily.    Jose Harp MD   risperiDONE (risperDAL) 0.25 MG tablet Take 0.25 mg by mouth 2 (Two) Times a Day.    Jose Harp MD       Medications   sodium chloride 0.9 % flush 10 mL (has no administration in time range)   dilTIAZem (CARDIZEM) 125 mg in sodium chloride 0.9 % 125 mL (1 mg/mL) infusion (12.5 mg/hr Intravenous Rate/Dose Change 10/1/20 0315)   sodium chloride 0.9 % bolus 1,602 mL (1,602 mL Intravenous New Bag 10/1/20 0316)   cefTRIAXone (ROCEPHIN) 1 g/100 mL 0.9% NS (MBP) (1 g Intravenous New Bag 10/1/20 0315)   AZITHROMYCIN 500 MG/250 ML 0.9% NS IVPB (vial-mate) (has no administration in time range)   LORazepam (ATIVAN) injection 1 mg (has no administration in time range)   sodium chloride 0.9 % bolus 1,000 mL (1,000 mL Intravenous New Bag 10/1/20 0242)   dilTIAZem (CARDIZEM) bolus from bag 1 mg/mL 10 mg (10 mg Intravenous Bolus from Bag 10/1/20 0220)       Vitals:    10/01/20 0310   BP: 125/90   Pulse:    Resp:    Temp:    SpO2: 95%         Objective   Physical Exam  Vitals signs and nursing note reviewed.   Constitutional:       Appearance: She is well-developed.   HENT:      Head: Normocephalic and atraumatic.   Neck:      Musculoskeletal: Normal range of motion and neck supple.   Cardiovascular:      Rate and Rhythm: Tachycardia present.      Comments: Patient is markedly tachycardic and seems to be in an irregular rhythm.  Pulmonary:      Breath sounds: Examination of the right-middle field  reveals rhonchi. Examination of the left-middle field reveals rhonchi. Rhonchi present.      Comments: Patient does seem to be tachypneic with scattered rhonchi bilaterally.  There is mild distress.  On the scene accessory muscle usage.  Abdominal:      General: Bowel sounds are normal.      Palpations: Abdomen is soft.   Musculoskeletal: Normal range of motion.   Neurological:      General: No focal deficit present.      Mental Status: She is alert.      Comments: Uncooperative with exam due to dementia.   Psychiatric:      Comments: Confused nonverbal.         Procedures         Lab Results (last 24 hours)     Procedure Component Value Units Date/Time    Blood Culture - Blood, Arm, Right [[id number]] Collected: 10/01/20 0150    Specimen: Blood from Arm, Right Updated: 10/01/20 0215    Blood Gas, Arterial [[id number]]  (Abnormal) Collected: 10/01/20 0155    Specimen: Arterial Blood Updated: 10/01/20 0159     Site Right Brachial     Daniel's Test N/A     pH, Arterial 7.522 pH units      Comment: 83 Value above reference range        pCO2, Arterial 24.5 mm Hg      Comment: 84 Value below reference range        pO2, Arterial 49.9 mm Hg      Comment: 85 Value below critical limit        HCO3, Arterial 20.1 mmol/L      Base Excess, Arterial -1.1 mmol/L      Comment: 84 Value below reference range        O2 Saturation, Arterial 89.9 %      Comment: 84 Value below reference range        Temperature 37.0 C      Barometric Pressure for Blood Gas 751 mmHg      Modality Nasal Cannula     Flow Rate 4.0 lpm      Ventilator Mode NA     Notified Who DR CONTEH     Notified By [id number]     Notified Time 10/01/2020 01:58     Collected by [id number]     Comment: Meter: [id number]     :  [id number]        pCO2, Temperature Corrected 24.5 mm Hg      pH, Temp Corrected 7.522 pH Units      pO2, Temperature Corrected 49.9 mm Hg     CBC & Differential [[id number]]  (Normal) Collected: 10/01/20 0200    Specimen: Blood from Arm, Right  Updated: 10/01/20 0247    Narrative:      The following orders were created for panel order CBC & Differential.  Procedure                               Abnormality         Status                     ---------                               -----------         ------                     CBC Auto Differential[[id number]]        Normal              Final result                 Please view results for these tests on the individual orders.    Blood Culture - Blood, Arm, Left [[id number]] Collected: 10/01/20 0200    Specimen: Blood from Arm, Left Updated: 10/01/20 0215    Lactic Acid, Plasma [[id number]]  (Abnormal) Collected: 10/01/20 0200    Specimen: Blood from Arm, Right Updated: 10/01/20 0237     Lactate 6.9 mmol/L     CBC Auto Differential [[id number]]  (Normal) Collected: 10/01/20 0200    Specimen: Blood from Arm, Right Updated: 10/01/20 0247     WBC 4.38 10*3/mm3      RBC 4.75 10*6/mm3      Hemoglobin 14.1 g/dL      Hematocrit 43.9 %      MCV 92.4 fL      MCH 29.7 pg      MCHC 32.1 g/dL      RDW 14.5 %      RDW-SD 49.0 fl      MPV 10.5 fL      Platelets 183 10*3/mm3     Lactic Acid, Reflex Timer (This will reflex a repeat order 3-3:15 hours after ordered.) [[id number]] Collected: 10/01/20 0200    Specimen: Blood from Arm, Right Updated: 10/01/20 0237    Manual Differential [[id number]]  (Abnormal) Collected: 10/01/20 0200    Specimen: Blood from Arm, Right Updated: 10/01/20 0247     Neutrophil % 48.0 %      Lymphocyte % 35.0 %      Monocyte % 4.0 %      Eosinophil % 2.0 %      Bands %  11.0 %      Neutrophils Absolute 2.58 10*3/mm3      Lymphocytes Absolute 1.53 10*3/mm3      Monocytes Absolute 0.18 10*3/mm3      Eosinophils Absolute 0.09 10*3/mm3      RBC Morphology Normal     WBC Morphology Normal     Platelet Estimate Adequate    COVID PRE-OP / PRE-PROCEDURE SCREENING ORDER (NO ISOLATION) - Swab, Nasal Cavity [[id number]]  (Normal) Collected: 10/01/20 0207    Specimen: Swab from Nasal Cavity Updated: 10/01/20 0236     Narrative:      The following orders were created for panel order COVID PRE-OP / PRE-PROCEDURE SCREENING ORDER (NO ISOLATION) - Swab, Nasal Cavity.  Procedure                               Abnormality         Status                     ---------                               -----------         ------                     COVID-19, [facility] IN-HOUS...[[id number]]  Normal              Final result                 Please view results for these tests on the individual orders.    COVID-19, [facility] IN-HOUSE,NP Swab (NO TRANSPORT MEDIA) 2 HR TAT - Swab, Nasal Cavity [[id number]]  (Normal) Collected: 10/01/20 0207    Specimen: Swab from Nasal Cavity Updated: 10/01/20 0236     COVID19 Not Detected    Narrative:      Fact sheet for providers: https://www.fda.gov/media/596915/download     Fact sheet for patients: https://www.fda.gov/media/796104/download    Urinalysis With Culture If Indicated - Urine, Catheter [[id number]]  (Abnormal) Collected: 10/01/20 0218    Specimen: Urine, Catheter Updated: 10/01/20 0234     Color, UA [place]     Appearance, UA Clear     pH, UA 6.5     Specific Gravity, UA >=1.030     Glucose,  mg/dL (Trace)     Ketones, UA Trace     Bilirubin, UA Small (1+)     Blood, UA Large (3+)     Protein, UA >=300 mg/dL (3+)     Leuk Esterase, UA Small (1+)     Nitrite, UA Negative     Urobilinogen, UA 1.0 E.U./dL    Narrative:      Dipstick results may be inaccurate due to color interference.    Urinalysis, Microscopic Only - Urine, Catheter [[id number]]  (Abnormal) Collected: 10/01/20 0218    Specimen: Urine, Catheter Updated: 10/01/20 0250     RBC, UA 31-50 /HPF      WBC, UA 3-5 /HPF      Bacteria, UA Trace /HPF      Squamous Epithelial Cells, UA 0-2 /HPF      Hyaline Casts, UA 0-2 /LPF      Methodology Manual Light Microscopy    Comprehensive Metabolic Panel [[id number]]  (Abnormal) Collected: 10/01/20 0240    Specimen: Blood from Arm, Right Updated: 10/01/20 0312     Glucose 227 mg/dL      BUN 23 mg/dL       Creatinine 0.69 mg/dL      Sodium 149 mmol/L      Potassium 3.8 mmol/L      Chloride 107 mmol/L      CO2 22.0 mmol/L      Calcium 9.5 mg/dL      Total Protein 7.0 g/dL      Albumin 3.60 g/dL      ALT (SGPT) 32 U/L      AST (SGOT) 26 U/L      Alkaline Phosphatase 147 U/L      Total Bilirubin 1.1 mg/dL      eGFR Non African Amer 81 mL/min/1.73      Globulin 3.4 gm/dL      A/G Ratio 1.1 g/dL      BUN/Creatinine Ratio 33.3     Anion Gap 20.0 mmol/L     Narrative:      GFR Normal >60  Chronic Kidney Disease <60  Kidney Failure <15      BNP [[id number]]  (Normal) Collected: 10/01/20 0240    Specimen: Blood from Arm, Right Updated: 10/01/20 0309     proBNP 1,507.0 pg/mL     Narrative:      Among patients with dyspnea, NT-proBNP is highly sensitive for the detection of acute congestive heart failure. In addition NT-proBNP of <300 pg/ml effectively rules out acute congestive heart failure with 99% negative predictive value.    Results may be falsely decreased if patient taking Biotin.      D-dimer, Quantitative [[id number]]  (Abnormal) Collected: 10/01/20 0240    Specimen: Blood from Arm, Right Updated: 10/01/20 0259     D-Dimer, Quantitative 0.75 mg/L (FEU)     Narrative:      Reference Range is 0-0.50 mg/L FEU. However, results <0.50 mg/L FEU tends to rule out DVT or PE. Results >0.50 mg/L FEU are not useful in predicting absence or presence of DVT or PE.      Troponin [[id number]]  (Abnormal) Collected: 10/01/20 0240    Specimen: Blood from Arm, Right Updated: 10/01/20 0316     Troponin T 0.080 ng/mL     Narrative:      Troponin T Reference Range:  <= 0.03 ng/mL-   Negative for AMI  >0.03 ng/mL-     Abnormal for myocardial necrosis.  Clinicians would have to utilize clinical acumen, EKG, Troponin and serial changes to determine if it is an Acute Myocardial Infarction or myocardial injury due to an underlying chronic condition.       Results may be falsely decreased if patient taking Biotin.            XR Chest 1 View   ED  Interpretation   RLL infiltrate          ED Course  ED Course as of Oct 01 0320   Thu Oct 01, 2020   0242 I was just notified of the elevated lactic acid.  Her chest x-ray looks like she has a right lower lobe infiltrate also.  We will treat her as septic and with pneumonia.    [TR]   0318 Believe the patient has a pneumonia and does qualify septic.  Her tachycardia does seem to be mostly in atrial fib however.  We will admit for further care.  I spoke with Dr. Mcknight.    [TR]      ED Course User Index  [TR] Jori Lombardi Jr., MD          MDM  Number of Diagnoses or Management Options  Pneumonia of right lower lobe due to infectious organism: new and requires workup     Amount and/or Complexity of Data Reviewed  Clinical lab tests: ordered and reviewed  Tests in the radiology section of CPT®: ordered and reviewed  Tests in the medicine section of CPT®: ordered and reviewed  Discuss the patient with other providers: yes  Independent visualization of images, tracings, or specimens: yes    Risk of Complications, Morbidity, and/or Mortality  Presenting problems: moderate  Diagnostic procedures: moderate  Management options: moderate    Patient Progress  Patient progress: stable      Final diagnoses:   Pneumonia of right lower lobe due to infectious organism          Jori Lombardi Jr., MD  10/01/20 0320

## 2020-10-01 NOTE — PROGRESS NOTES
[facility] Medicine Services  INPATIENT PROGRESS NOTE    Length of Stay: 0  Date of Admission: 10/1/2020  Primary Care Physician: Slade Barroso MD    Subjective   Chief Complaint: Acute respiratory failure with hypoxia/pneumonia/dementia.    HPI   Patient is currently tachypneic with nonrebreather.  Patient does withdraw and grimace to pain.  Sepsis bolus was given in ER.  Question of positive fluid overload today.  Rhonchi's wheezing bilateral lung.    Review of Systems   Unable to obtain due to dementia and altered mental status.    All pertinent negatives and positives are as above. All other systems have been reviewed and are negative unless otherwise stated.     Objective    Temp:  [98.2 °F (36.8 °C)-98.6 °F (37 °C)] 98.2 °F (36.8 °C)  Heart Rate:  [105-176] 105  Resp:  [24-42] 42  BP: (110-138)/(56-95) 115/66    Intake/Output Summary (Last 24 hours) at 10/1/2020 0834  Last data filed at 10/1/2020 0400  Gross per 24 hour   Intake 2100 ml   Output --   Net 2100 ml     Physical Exam  Vitals signs and nursing note reviewed.   Constitutional:       Appearance: She is well-developed.   HENT:      Head: Normocephalic.   Eyes:      Conjunctiva/sclera: Conjunctivae normal.   Neck:      Musculoskeletal: Neck supple.      Vascular: No JVD.   Cardiovascular:      Heart sounds: Normal heart sounds. No murmur. No friction rub. No gallop.       Comments: Irregular.  Rates controlled in the 90s with a Cardizem drip.  Pulmonary:      Effort: No respiratory distress.      Breath sounds: Wheezing, rhonchi and rales present.      Comments: Patient is currently on nonrebreather mask.  Tachypneic.  Wheezing and rhonchi bilateral lungs.  Chest:      Chest wall: No tenderness.   Abdominal:      General: Bowel sounds are normal. There is no distension.      Palpations: Abdomen is soft.      Tenderness: There is no abdominal tenderness. There is no guarding or rebound.   Musculoskeletal:          General: No tenderness or deformity.   Skin:     General: Skin is warm and dry.      Capillary Refill: Capillary refill takes 2 to 3 seconds.      Findings: No rash.   Neurological:      Cranial Nerves: No cranial nerve deficit.      Motor: Weakness present. No abnormal muscle tone.      Coordination: Coordination abnormal.      Gait: Gait abnormal.      Deep Tendon Reflexes: Reflexes normal.       Results Review:  Lab Results (last 24 hours)     Procedure Component Value Units Date/Time    CBC Auto Differential [[id number]]  (Abnormal) Collected: 10/01/20 0617    Specimen: Blood Updated: 10/01/20 0703     WBC 4.98 10*3/mm3      RBC 3.98 10*6/mm3      Hemoglobin 11.6 g/dL      Hematocrit 37.3 %      MCV 93.7 fL      MCH 29.1 pg      MCHC 31.1 g/dL      RDW 14.6 %      RDW-SD 49.8 fl      MPV 9.8 fL      Platelets 151 10*3/mm3     Manual Differential [[id number]]  (Abnormal) Collected: 10/01/20 0617    Specimen: Blood Updated: 10/01/20 0703     Neutrophil % 54.5 %      Lymphocyte % 11.1 %      Monocyte % 4.0 %      Eosinophil % 1.0 %      Bands %  28.3 %      Metamyelocyte % 1.0 %      Neutrophils Absolute 4.12 10*3/mm3      Lymphocytes Absolute 0.55 10*3/mm3      Monocytes Absolute 0.20 10*3/mm3      Eosinophils Absolute 0.05 10*3/mm3      nRBC 1.0 /100 WBC      Poikilocytes Slight/1+     Polychromasia Slight/1+     WBC Morphology Normal     Giant Platelets Slight/1+    Troponin [[id number]]  (Abnormal) Collected: 10/01/20 0617    Specimen: Blood Updated: 10/01/20 0651     Troponin T 0.121 ng/mL     Narrative:      Troponin T Reference Range:  <= 0.03 ng/mL-   Negative for AMI  >0.03 ng/mL-     Abnormal for myocardial necrosis.  Clinicians would have to utilize clinical acumen, EKG, Troponin and serial changes to determine if it is an Acute Myocardial Infarction or myocardial injury due to an underlying chronic condition.       Results may be falsely decreased if patient taking Biotin.      Basic Metabolic Panel  [[id number]]  (Abnormal) Collected: 10/01/20 0617    Specimen: Blood Updated: 10/01/20 0649     Glucose 213 mg/dL      BUN 18 mg/dL      Creatinine 0.58 mg/dL      Sodium 149 mmol/L      Potassium 3.6 mmol/L      Chloride 112 mmol/L      CO2 19.0 mmol/L      Calcium 8.3 mg/dL      eGFR Non African Amer 100 mL/min/1.73      BUN/Creatinine Ratio 31.0     Anion Gap 18.0 mmol/L     Narrative:      GFR Normal >60  Chronic Kidney Disease <60  Kidney Failure <15      Lactic Acid, Reflex [[id number]]  (Abnormal) Collected: 10/01/20 0617    Specimen: Blood Updated: 10/01/20 0645     Lactate 8.9 mmol/L     T4, Free [[id number]]  (Normal) Collected: 10/01/20 0240    Specimen: Blood from Arm, Right Updated: 10/01/20 0610     Free T4 1.11 ng/dL     Narrative:      Results may be falsely increased if patient taking Biotin.      TSH [[id number]]  (Normal) Collected: 10/01/20 0240    Specimen: Blood from Arm, Right Updated: 10/01/20 0609     TSH 2.450 uIU/mL     Phosphorus [[id number]]  (Normal) Collected: 10/01/20 0240    Specimen: Blood from Arm, Right Updated: 10/01/20 0559     Phosphorus 2.8 mg/dL     Magnesium [[id number]]  (Normal) Collected: 10/01/20 0240    Specimen: Blood from Arm, Right Updated: 10/01/20 0557     Magnesium 2.2 mg/dL     Hemoglobin A1c [[id number]]  (Abnormal) Collected: 10/01/20 0200    Specimen: Blood from Arm, Right Updated: 10/01/20 0546     Hemoglobin A1C 6.40 %     Narrative:      Hemoglobin A1C Ranges:    Increased Risk for Diabetes  5.7% to 6.4%  Diabetes                     >= 6.5%  Diabetic Goal                < 7.0%    Lactic Acid, Reflex Timer (This will reflex a repeat order 3-3:15 hours after ordered.) [[id number]] Collected: 10/01/20 0200    Specimen: Blood from Arm, Right Updated: 10/01/20 0545     Hold Tube Hold for add-ons.     Comment: Auto resulted.       Troponin [[id number]]  (Abnormal) Collected: 10/01/20 0240    Specimen: Blood from Arm, Right Updated: 10/01/20 0316     Troponin T 0.080  ng/mL     Narrative:      Troponin T Reference Range:  <= 0.03 ng/mL-   Negative for AMI  >0.03 ng/mL-     Abnormal for myocardial necrosis.  Clinicians would have to utilize clinical acumen, EKG, Troponin and serial changes to determine if it is an Acute Myocardial Infarction or myocardial injury due to an underlying chronic condition.       Results may be falsely decreased if patient taking Biotin.      Comprehensive Metabolic Panel [[id number]]  (Abnormal) Collected: 10/01/20 0240    Specimen: Blood from Arm, Right Updated: 10/01/20 0312     Glucose 227 mg/dL      BUN 23 mg/dL      Creatinine 0.69 mg/dL      Sodium 149 mmol/L      Potassium 3.8 mmol/L      Chloride 107 mmol/L      CO2 22.0 mmol/L      Calcium 9.5 mg/dL      Total Protein 7.0 g/dL      Albumin 3.60 g/dL      ALT (SGPT) 32 U/L      AST (SGOT) 26 U/L      Alkaline Phosphatase 147 U/L      Total Bilirubin 1.1 mg/dL      eGFR Non African Amer 81 mL/min/1.73      Globulin 3.4 gm/dL      A/G Ratio 1.1 g/dL      BUN/Creatinine Ratio 33.3     Anion Gap 20.0 mmol/L     Narrative:      GFR Normal >60  Chronic Kidney Disease <60  Kidney Failure <15      BNP [[id number]]  (Normal) Collected: 10/01/20 0240    Specimen: Blood from Arm, Right Updated: 10/01/20 0309     proBNP 1,507.0 pg/mL     Narrative:      Among patients with dyspnea, NT-proBNP is highly sensitive for the detection of acute congestive heart failure. In addition NT-proBNP of <300 pg/ml effectively rules out acute congestive heart failure with 99% negative predictive value.    Results may be falsely decreased if patient taking Biotin.      D-dimer, Quantitative [[id number]]  (Abnormal) Collected: 10/01/20 0240    Specimen: Blood from Arm, Right Updated: 10/01/20 0259     D-Dimer, Quantitative 0.75 mg/L (FEU)     Narrative:      Reference Range is 0-0.50 mg/L FEU. However, results <0.50 mg/L FEU tends to rule out DVT or PE. Results >0.50 mg/L FEU are not useful in predicting absence or presence of  DVT or PE.      Urinalysis, Microscopic Only - Urine, Catheter [[id number]]  (Abnormal) Collected: 10/01/20 0218    Specimen: Urine, Catheter Updated: 10/01/20 0250     RBC, UA 31-50 /HPF      WBC, UA 3-5 /HPF      Bacteria, UA Trace /HPF      Squamous Epithelial Cells, UA 0-2 /HPF      Hyaline Casts, UA 0-2 /LPF      Methodology Manual Light Microscopy    CBC & Differential [[id number]]  (Normal) Collected: 10/01/20 0200    Specimen: Blood from Arm, Right Updated: 10/01/20 0247    Narrative:      The following orders were created for panel order CBC & Differential.  Procedure                               Abnormality         Status                     ---------                               -----------         ------                     CBC Auto Differential[[id number]]        Normal              Final result                 Please view results for these tests on the individual orders.    CBC Auto Differential [[id number]]  (Normal) Collected: 10/01/20 0200    Specimen: Blood from Arm, Right Updated: 10/01/20 0247     WBC 4.38 10*3/mm3      RBC 4.75 10*6/mm3      Hemoglobin 14.1 g/dL      Hematocrit 43.9 %      MCV 92.4 fL      MCH 29.7 pg      MCHC 32.1 g/dL      RDW 14.5 %      RDW-SD 49.0 fl      MPV 10.5 fL      Platelets 183 10*3/mm3     Manual Differential [[id number]]  (Abnormal) Collected: 10/01/20 0200    Specimen: Blood from Arm, Right Updated: 10/01/20 0247     Neutrophil % 48.0 %      Lymphocyte % 35.0 %      Monocyte % 4.0 %      Eosinophil % 2.0 %      Bands %  11.0 %      Neutrophils Absolute 2.58 10*3/mm3      Lymphocytes Absolute 1.53 10*3/mm3      Monocytes Absolute 0.18 10*3/mm3      Eosinophils Absolute 0.09 10*3/mm3      RBC Morphology Normal     WBC Morphology Normal     Platelet Estimate Adequate    Lactic Acid, Plasma [[id number]]  (Abnormal) Collected: 10/01/20 0200    Specimen: Blood from Arm, Right Updated: 10/01/20 0237     Lactate 6.9 mmol/L     COVID PRE-OP / PRE-PROCEDURE SCREENING ORDER  (NO ISOLATION) - Swab, Nasal Cavity [[id number]]  (Normal) Collected: 10/01/20 0207    Specimen: Swab from Nasal Cavity Updated: 10/01/20 0236    Narrative:      The following orders were created for panel order COVID PRE-OP / PRE-PROCEDURE SCREENING ORDER (NO ISOLATION) - Swab, Nasal Cavity.  Procedure                               Abnormality         Status                     ---------                               -----------         ------                     COVID-19, ABBOTT IN-HOUS...[[id number]]  Normal              Final result                 Please view results for these tests on the individual orders.    COVID-19, ABBOTT IN-HOUSE,NP Swab (NO TRANSPORT MEDIA) 2 HR TAT - Swab, Nasal Cavity [[id number]]  (Normal) Collected: 10/01/20 0207    Specimen: Swab from Nasal Cavity Updated: 10/01/20 0236     COVID19 Not Detected    Narrative:      Fact sheet for providers: https://www.fda.gov/media/804258/download     Fact sheet for patients: https://www.fda.gov/media/080656/download    Urinalysis With Culture If Indicated - Urine, Catheter [[id number]]  (Abnormal) Collected: 10/01/20 0218    Specimen: Urine, Catheter Updated: 10/01/20 0234     Color, UA [place]     Appearance, UA Clear     pH, UA 6.5     Specific Gravity, UA >=1.030     Glucose,  mg/dL (Trace)     Ketones, UA Trace     Bilirubin, UA Small (1+)     Blood, UA Large (3+)     Protein, UA >=300 mg/dL (3+)     Leuk Esterase, UA Small (1+)     Nitrite, UA Negative     Urobilinogen, UA 1.0 E.U./dL    Narrative:      Dipstick results may be inaccurate due to color interference.    Blood Culture - Blood, Arm, Right [[id number]] Collected: 10/01/20 0150    Specimen: Blood from Arm, Right Updated: 10/01/20 0215    Blood Culture - Blood, Arm, Left [[id number]] Collected: 10/01/20 0200    Specimen: Blood from Arm, Left Updated: 10/01/20 0215    Blood Gas, Arterial [[id number]]  (Abnormal) Collected: 10/01/20 0155    Specimen: Arterial Blood Updated: 10/01/20 0159       Site Right Brachial     Daniel's Test N/A     pH, Arterial 7.522 pH units      Comment: 83 Value above reference range        pCO2, Arterial 24.5 mm Hg      Comment: 84 Value below reference range        pO2, Arterial 49.9 mm Hg      Comment: 85 Value below critical limit        HCO3, Arterial 20.1 mmol/L      Base Excess, Arterial -1.1 mmol/L      Comment: 84 Value below reference range        O2 Saturation, Arterial 89.9 %      Comment: 84 Value below reference range        Temperature 37.0 C      Barometric Pressure for Blood Gas 751 mmHg      Modality Nasal Cannula     Flow Rate 4.0 lpm      Ventilator Mode NA     Notified Who DR CONTEH     Notified By [id number]     Notified Time 10/01/2020 01:58     Collected by [id number]     Comment: Meter: [id number]     :  [id number]        pCO2, Temperature Corrected 24.5 mm Hg      pH, Temp Corrected 7.522 pH Units      pO2, Temperature Corrected 49.9 mm Hg            Cultures:  No results found for: BLOODCX, URINECX, WOUNDCX, MRSACX, RESPCX, STOOLCX    Radiology Data:    Imaging Results (Last 24 Hours)     Procedure Component Value Units Date/Time    XR Chest 1 View [[id number]] Collected: 10/01/20 0646     Updated: 10/01/20 0650    Narrative:      EXAMINATION: XR CHEST 1 VW- 10/1/2020 6:46 AM CDT     HISTORY: Shortness of breath     COMPARISON: 7/7/2020     FINDINGS:  The heart and mediastinal contours are stable. Atherosclerotic  calcifications are seen in the aorta. No airspace opacities are noted in  the right lung base. This appears superimposed upon mild chronic  interstitial changes. There is no appreciable pneumothorax. There  appears to be trace pleural fluid on the right with blunting of the  costophrenic angle.       Impression:      Airspace disease in the right lung base concerning for  pneumonia.  This report was finalized on 10/01/2020 06:47 by Dr. Jose Love MD.          Allergies   Allergen Reactions   • Sulfa Antibiotics Unknown - High  Severity   • Ultram [Tramadol Hcl] Unknown - High Severity       Scheduled meds:   apixaban, 5 mg, Oral, Q12H  [START ON 10/2/2020] fentaNYL, 1 patch, Transdermal, Q72H  fludrocortisone, 0.1 mg, Oral, Daily  guaiFENesin, 1,200 mg, Oral, Q12H  HYDROcodone-acetaminophen, 1 tablet, Oral, BID  insulin lispro, 2-7 Units, Subcutaneous, TID AC  ipratropium-albuterol, 3 mL, Nebulization, 4x Daily - RT  piperacillin-tazobactam, 3.375 g, Intravenous, Q8H  sodium chloride, 10 mL, Intravenous, Q12H  vancomycin, 1,000 mg, Intravenous, Q24H        PRN meds:  •  [DISCONTINUED] acetaminophen **OR** acetaminophen **OR** acetaminophen  •  acetaminophen  •  albuterol  •  dextrose  •  dextrose  •  glucagon (human recombinant)  •  nitroglycerin  •  ondansetron **OR** ondansetron  •  risperiDONE  •  [COMPLETED] Insert peripheral IV **AND** sodium chloride  •  sodium chloride    Assessment/Plan       Pneumonia of right lower lobe due to infectious organism    Hypertension    Chronic back pain    Sepsis (CMS/HCC)    Dementia with behavioral disturbance (CMS/HCC)    History of pulmonary embolism    Acute respiratory failure with hypoxia (CMS/HCC)    Paroxysmal atrial fibrillation with rapid ventricular response (CMS/HCC)      Plan:  Altered mental status.  Patient does withdraw and grimace to pain.    Sepsis/acute respiratory failure with hypoxia/pneumonia/tachypneic.  Possible aspiration pneumonia.  Lactate 8.9.  On vancomycin and Zosyn.  Pulmonary toilet.  Mucinex.  Duo nebs.  Patient got 2600 cc bolus in ER.  Chest x-ray- Airspace disease in the right lung base concerning for pneumonia.    Atrial for with RVR/hypertension.  Cardene drip.  Change Eliquis to Lovenox therapeutic.  Echocardiogram pending.    Hypernatremia.  DC fludrocortisone.  DC fluids for now until echocardiogram come back.    History of pulmonary embolus.  On chronic anticoagulation.  Change Eliquis to Lovenox therapeutic.    Elevated d-dimer.  Patient on   anticoagulation.  No further work-up for now.    Diabetes.  Sliding scale.  Hemoglobin A1c 6.4.    Alzheimer dementia with behavioral disturbance.  Hold Risperdal.    Chronic pain.  DC fentanyl patch until patient is more alert.  Hold [place].    Respiratory PCR.  Strep pneumo.  Legionella antigen.  MRSA PCR.  Urine culture pending.  [id number].    Nutrition.  Patient is n.p.o.  Patient failed speech.    Discharge Planning: DNR/DNI.  Palliative care consult.  Prognosis guarded.    Electronically signed by Glenn Harding MD, 10/01/20, 8:34 AM CDT.

## 2020-10-01 NOTE — H&P
[facility] Medicine Services  HISTORY AND PHYSICAL    Date of Admission: 10/1/2020  Primary Care Physician: Slade Barroso MD    Subjective     Chief Complaint: Increasing shortness of breath    History of Present Illness  Patient is an 80-year-old white female nursing home resident history of Alzheimer's type dementia.  She was transferred over to the from the nursing home with apparently increasing shortness of breath.  She was found to be in some respiratory distress when she got here hypoxic placed on facemask oxygen.  Patient is a DNR/DNI.  Chest x-ray shows right lower infiltrate consistent with pneumonia.  Patient has been hospitalized here with what is thought to be probably aspiration pneumonia.  She does have a history listed as dysphasia.  She also has a recent history of this year with pulmonary embolism on anticoagulation for this.  Patient is essentially nonverbal unable to give any history.  Patient is also found to be in A. fib with rapid ventricular response.  He will be placed on a Cardizem drip cultured antibiotics initiated.  Admitted go ahead and broaden her coverage out due to the history of questionable aspiration as well as this will be about her fourth admission this year so probably better to treat her for healthcare acquired pneumonia.  She also has an elevated lactic of 6.9 so believe she qualifies for sepsis as well.  Her troponin is slightly elevated however it is about the same range that this is been in the past.  EKG does have some ST depression in the inferior lateral leads, but her rate is quite high.      Review of Systems   Unable to obtain patient is nonverbal and demented.  Otherwise complete ROS reviewed and negative except as mentioned in the HPI.    Past Medical History:   Past Medical History:   Diagnosis Date   • Alzheimer disease (CMS/HCC)     Per Nursing home paperwork   • Alzheimer's dementia (CMS/HCC)    • Anxiety disorder      per Nursing home paperwork   • Arthritis    • Bipolar 1 disorder (CMS/[facility])     Nursing home paperwork   • Chronic back pain     per Nursing home paperwork   • Constipation     Per nursing home paperwork   • Dementia (CMS/[facility])     per nursing home paperwork   • Dysphagia    • Hyperlipidemia    • Hypertension    • Major depressive disorder     per Nursing home paperwork   • Osteoarthritis     per nursing home paperwork.   • Pulmonary air embolism (CMS/[facility])     Per nursing home paperwork     Past Surgical History:History reviewed. No pertinent surgical history.  Social History:  reports that she has never smoked. She has never used smokeless tobacco. She reports previous alcohol use. She reports previous drug use.    Family History: family history is not on file.   Unable to obtain from patient she is demented.    Allergies:  Allergies   Allergen Reactions   • Sulfa Antibiotics Unknown - High Severity   • Ultram [Tramadol Hcl] Unknown - High Severity     Medications:  Prior to Admission medications    Medication Sig Start Date End Date Taking? Authorizing Provider   apixaban (ELIQUIS) 5 MG tablet tablet Take 5 mg by mouth 2 (Two) Times a Day.   Yes Jose Harp MD   fentaNYL (DURAGESIC) 25 MCG/HR patch Place 1 patch on the skin as directed by provider Every 72 (Seventy-Two) Hours. 7/10/20  Yes Kishore Simon MD   HYDROcodone-acetaminophen (NORCO) 5-325 MG per tablet Take 1 tablet by mouth 2 (two) times a day. 7/10/20  Yes Kishore Simon MD   megestrol acetate (MEGACE) 400 MG/10ML suspension oral suspension Take 400 mg by mouth Daily.   Yes Jose Harp MD   risperiDONE (risperDAL) 0.25 MG tablet Take 0.25 mg by mouth 2 (Two) Times a Day.   Yes Jose Harp MD   acetaminophen (TYLENOL) 325 MG tablet Take 650 mg by mouth Every 4 (Four) Hours As Needed for Mild Pain  or Fever.    Jose Harp MD   fludrocortisone 0.1 MG tablet Take 0.1 mg by mouth Daily.     Provider, MD Jose     Objective     Vital Signs: /56   Pulse (!) 125   Temp 98.6 °F (37 °C) (Oral)   Resp 26   Wt 53.4 kg (117 lb 12.8 oz)   SpO2 95%   BMI 20.87 kg/m²   Physical Exam   Gen.: Chronically ill-appearing cachectic white female in no acute distress  HEENT: Atraumatic, normocephalic.  Pupils equal, round, and reactive to light. Extraocular movements intact.  Sclerae anicteric.  External ears negative.  Mucous membranes moist.  Neck is supple without lymphadenopathy.  No JVD is noted.  No carotid bruits are auscultated.  Oropharynx is without erythema or exudate.   Chest: Clear to auscultation and percussion.  CV: Regular rate and rhythm.  Normal S1-S2.  No gallops, murmurs. or rubs.  Abdomen: Soft, nontender, nondistended.  Active bowel sounds,  No hepatosplenomegaly.  No masses.  No hernias.  Extremities: No clubbing, edema, or cyanosis.  Capillary refill is normal.  Pulses are 2+ and symmetric at radial and dorsalis pedis.  Posterior tibial pulses are intact and 2+ palpable.  Patient has significant contractures of the lower extremities she also has some mottling bilaterally  Neuro: Patient is awake, but confused.  Cranial nerves II through XII are grossly intact.  Motor is unable to get patient to cooperate with exam.  DTRs are 2+ and symmetric bilaterally. Sensory exam is unable to be assessed patient is demented  Skin: Warm, dry, and intact.  No evidence of breakdown.  Sensorium: Patient is demented    Nursing notes and vital signs reviewed        Results Reviewed:  Lab Results (last 24 hours)     Procedure Component Value Units Date/Time    Troponin [[id number]]  (Abnormal) Collected: 10/01/20 0240    Specimen: Blood from Arm, Right Updated: 10/01/20 0316     Troponin T 0.080 ng/mL     Narrative:      Troponin T Reference Range:  <= 0.03 ng/mL-   Negative for AMI  >0.03 ng/mL-     Abnormal for myocardial necrosis.  Clinicians would have to utilize clinical acumen, EKG, Troponin  and serial changes to determine if it is an Acute Myocardial Infarction or myocardial injury due to an underlying chronic condition.       Results may be falsely decreased if patient taking Biotin.      Comprehensive Metabolic Panel [[id number]]  (Abnormal) Collected: 10/01/20 0240    Specimen: Blood from Arm, Right Updated: 10/01/20 0312     Glucose 227 mg/dL      BUN 23 mg/dL      Creatinine 0.69 mg/dL      Sodium 149 mmol/L      Potassium 3.8 mmol/L      Chloride 107 mmol/L      CO2 22.0 mmol/L      Calcium 9.5 mg/dL      Total Protein 7.0 g/dL      Albumin 3.60 g/dL      ALT (SGPT) 32 U/L      AST (SGOT) 26 U/L      Alkaline Phosphatase 147 U/L      Total Bilirubin 1.1 mg/dL      eGFR Non African Amer 81 mL/min/1.73      Globulin 3.4 gm/dL      A/G Ratio 1.1 g/dL      BUN/Creatinine Ratio 33.3     Anion Gap 20.0 mmol/L     Narrative:      GFR Normal >60  Chronic Kidney Disease <60  Kidney Failure <15      BNP [[id number]]  (Normal) Collected: 10/01/20 0240    Specimen: Blood from Arm, Right Updated: 10/01/20 0309     proBNP 1,507.0 pg/mL     Narrative:      Among patients with dyspnea, NT-proBNP is highly sensitive for the detection of acute congestive heart failure. In addition NT-proBNP of <300 pg/ml effectively rules out acute congestive heart failure with 99% negative predictive value.    Results may be falsely decreased if patient taking Biotin.      D-dimer, Quantitative [[id number]]  (Abnormal) Collected: 10/01/20 0240    Specimen: Blood from Arm, Right Updated: 10/01/20 0259     D-Dimer, Quantitative 0.75 mg/L (FEU)     Narrative:      Reference Range is 0-0.50 mg/L FEU. However, results <0.50 mg/L FEU tends to rule out DVT or PE. Results >0.50 mg/L FEU are not useful in predicting absence or presence of DVT or PE.      Urinalysis, Microscopic Only - Urine, Catheter [[id number]]  (Abnormal) Collected: 10/01/20 0218    Specimen: Urine, Catheter Updated: 10/01/20 0250     RBC, UA 31-50 /HPF      WBC, UA 3-5  /HPF      Bacteria, UA Trace /HPF      Squamous Epithelial Cells, UA 0-2 /HPF      Hyaline Casts, UA 0-2 /LPF      Methodology Manual Light Microscopy    CBC & Differential [[id number]]  (Normal) Collected: 10/01/20 0200    Specimen: Blood from Arm, Right Updated: 10/01/20 0247    Narrative:      The following orders were created for panel order CBC & Differential.  Procedure                               Abnormality         Status                     ---------                               -----------         ------                     CBC Auto Differential[[id number]]        Normal              Final result                 Please view results for these tests on the individual orders.    CBC Auto Differential [[id number]]  (Normal) Collected: 10/01/20 0200    Specimen: Blood from Arm, Right Updated: 10/01/20 0247     WBC 4.38 10*3/mm3      RBC 4.75 10*6/mm3      Hemoglobin 14.1 g/dL      Hematocrit 43.9 %      MCV 92.4 fL      MCH 29.7 pg      MCHC 32.1 g/dL      RDW 14.5 %      RDW-SD 49.0 fl      MPV 10.5 fL      Platelets 183 10*3/mm3     Manual Differential [[id number]]  (Abnormal) Collected: 10/01/20 0200    Specimen: Blood from Arm, Right Updated: 10/01/20 0247     Neutrophil % 48.0 %      Lymphocyte % 35.0 %      Monocyte % 4.0 %      Eosinophil % 2.0 %      Bands %  11.0 %      Neutrophils Absolute 2.58 10*3/mm3      Lymphocytes Absolute 1.53 10*3/mm3      Monocytes Absolute 0.18 10*3/mm3      Eosinophils Absolute 0.09 10*3/mm3      RBC Morphology Normal     WBC Morphology Normal     Platelet Estimate Adequate    Lactic Acid, Plasma [[id number]]  (Abnormal) Collected: 10/01/20 0200    Specimen: Blood from Arm, Right Updated: 10/01/20 0237     Lactate 6.9 mmol/L     Lactic Acid, Reflex Timer (This will reflex a repeat order 3-3:15 hours after ordered.) [[id number]] Collected: 10/01/20 0200    Specimen: Blood from Arm, Right Updated: 10/01/20 0237    COVID PRE-OP / PRE-PROCEDURE SCREENING ORDER (NO ISOLATION) -  Swab, Nasal Cavity [[id number]]  (Normal) Collected: 10/01/20 0207    Specimen: Swab from Nasal Cavity Updated: 10/01/20 0236    Narrative:      The following orders were created for panel order COVID PRE-OP / PRE-PROCEDURE SCREENING ORDER (NO ISOLATION) - Swab, Nasal Cavity.  Procedure                               Abnormality         Status                     ---------                               -----------         ------                     COVID-19, [facility]OTT IN-HOUS...[[id number]]  Normal              Final result                 Please view results for these tests on the individual orders.    COVID-19, [facility]OTT IN-HOUSE,NP Swab (NO TRANSPORT MEDIA) 2 HR TAT - Swab, Nasal Cavity [[id number]]  (Normal) Collected: 10/01/20 0207    Specimen: Swab from Nasal Cavity Updated: 10/01/20 0236     COVID19 Not Detected    Narrative:      Fact sheet for providers: https://www.fda.gov/media/503786/download     Fact sheet for patients: https://www.fda.gov/media/485627/download    Urinalysis With Culture If Indicated - Urine, Catheter [[id number]]  (Abnormal) Collected: 10/01/20 0218    Specimen: Urine, Catheter Updated: 10/01/20 0234     Color, UA [place]     Appearance, UA Clear     pH, UA 6.5     Specific Gravity, UA >=1.030     Glucose,  mg/dL (Trace)     Ketones, UA Trace     Bilirubin, UA Small (1+)     Blood, UA Large (3+)     Protein, UA >=300 mg/dL (3+)     Leuk Esterase, UA Small (1+)     Nitrite, UA Negative     Urobilinogen, UA 1.0 E.U./dL    Narrative:      Dipstick results may be inaccurate due to color interference.    Blood Culture - Blood, Arm, Right [[id number]] Collected: 10/01/20 0150    Specimen: Blood from Arm, Right Updated: 10/01/20 0215    Blood Culture - Blood, Arm, Left [[id number]] Collected: 10/01/20 0200    Specimen: Blood from Arm, Left Updated: 10/01/20 0215    Blood Gas, Arterial [[id number]]  (Abnormal) Collected: 10/01/20 0155    Specimen: Arterial Blood Updated: 10/01/20 0159     Site Right  Brachial     Daniel's Test N/A     pH, Arterial 7.522 pH units      Comment: 83 Value above reference range        pCO2, Arterial 24.5 mm Hg      Comment: 84 Value below reference range        pO2, Arterial 49.9 mm Hg      Comment: 85 Value below critical limit        HCO3, Arterial 20.1 mmol/L      Base Excess, Arterial -1.1 mmol/L      Comment: 84 Value below reference range        O2 Saturation, Arterial 89.9 %      Comment: 84 Value below reference range        Temperature 37.0 C      Barometric Pressure for Blood Gas 751 mmHg      Modality Nasal Cannula     Flow Rate 4.0 lpm      Ventilator Mode NA     Notified Who DR CONTEH     Notified By [id number]     Notified Time 10/01/2020 01:58     Collected by [id number]     Comment: Meter: [id number]     :  [id number]        pCO2, Temperature Corrected 24.5 mm Hg      pH, Temp Corrected 7.522 pH Units      pO2, Temperature Corrected 49.9 mm Hg         Imaging Results (Last 24 Hours)     Procedure Component Value Units Date/Time    XR Chest 1 View [[id number]] Resulted: 10/01/20 0241     Updated: 10/01/20 0241        I have personally reviewed and interpreted the radiology studies and ECG obtained at time of admission.     Assessment / Plan     Assessment:   Active Hospital Problems    Diagnosis   • **Pneumonia of right lower lobe due to infectious organism   • Dementia with behavioral disturbance (CMS/HCC)   • History of pulmonary embolism   • Acute respiratory failure with hypoxia (CMS/HCC)   • Paroxysmal atrial fibrillation with rapid ventricular response (CMS/HCC)   • Sepsis (CMS/HCC)   • Hypertension   • Chronic back pain     per Nursing home paperwork     1.  Pneumonia more likely healthcare acquired possibly aspiration will culture blood sputum check respiratory PCR panel urine for strep pneumo and Legionella antigens.  Will  continue antibiotics but change to broader coverage with vancomycin and Zosyn.  Check PCR for MRSA if negative can DC the vancomycin.   Hydrate with IV fluids Mucinex duo nebs for pulmonary toilet.  2.  Sepsis fluids antibiotics cultures as above.  We will also check a urine culture just in case she has another source she is unable to tell.  3.  Atrial fibrillation with rapid ventricular response Cardizem drip check 2D echo continue anticoagulation that she is on for pulmonary embolism.  We will repeat another troponin just to make sure that it is flat trend likely a type II leak.  D-dimers are elevated however she is on anticoagulation no further work-up.  4.  Hypertension stable continue current medications monitor BP.  5.  Dementia with behavioral disturbances since she is so lethargic change her medications to PRN.  6.  Chronic pain syndrome continue fentanyl patch.    Patient seen after midnight         Code Status: DNR/DNI     I discussed the patient's findings and my recommendations with the ER attending.  Jonna Stone her daughter is her surrogate healthcare decision maker.    Estimated length of stay greater than 2 nights.    Patient seen and examined by me on October 1, 2020 at 3:50 AM.    Electronically signed by Shaun Mcknight MD, 10/01/20, 04:22 CDT.

## 2020-10-01 NOTE — NURSING NOTE
Fentanyl patch 25mcg to right chest removed per MD order until pt more alert. Will continue to monitor.

## 2020-10-01 NOTE — PROGRESS NOTES
Discharge Planning Assessment  [facility]     Patient Name: Zuleyma Ernst  MRN: [id number]  Today's Date: 10/1/2020    Admit Date: 10/1/2020    Discharge Needs Assessment     Row Name 10/01/20 1039       Living Environment    Lives With  facility resident  (Pended)     Current Living Arrangements  residential facility  (Pended)     Provides Primary Care For  no one, unable/limited ability to care for self  (Pended)     Quality of Family Relationships  helpful;involved;supportive  (Pended)        Transition Planning    Patient/Family Anticipates Transition to  long-term care facility  (Pended)     Patient/Family Anticipated Services at Transition  skilled nursing  (Pended)        Discharge Needs Assessment    Discharge Facility/Level of Care Needs  nursing facility, intermediate  (Pended)     Row Name 10/01/20 1028       Living Environment    Lives With  --  (Pended)     Current Living Arrangements  --  (Pended)     Provides Primary Care For  --  (Pended)     Quality of Family Relationships  --  (Pended)        Transition Planning    Patient/Family Anticipates Transition to  --  (Pended)     Patient/Family Anticipated Services at Transition  --  (Pended)         Discharge Plan     Row Name 10/01/20 1042       Plan    Plan  [facility]  (Pended)     Patient/Family in Agreement with Plan  yes  (Pended)     Plan Comments  Called and spoke with pt's son Jose. Pt resides at [facility]. Called and spoke with Yasmeen at [facility] [phone] and she states that pt has a bed hold at the intermediate level. Pt has PCP/RX coverage. Will follow.  (Pended)     Row Name 10/01/20 1030       Plan    Plan Comments  Called and left Yasmeen at [facility] [phone] a message to see if pt has a bed hold. Will await returned phone call.  (Pended)         Continued Care and Services - Admitted Since 10/1/2020    Coordination has not been started for this encounter.     Selected  Continued Care - Prior Encounters Includes selections from prior encounters from 7/3/2020 to 10/1/2020    Discharged on 7/10/2020 Admission date: 7/7/2020 - Discharge disposition: Skilled Nursing Facility (DC - External)    Destination     Service Provider Selected Services Address Phone Fax    [facility]  Skilled Nursing [address] [facility] [address] [phone]                      Demographic Summary    No documentation.       Functional Status    No documentation.       Psychosocial    No documentation.       Abuse/Neglect    No documentation.       Legal    No documentation.       Substance Abuse    No documentation.       Patient Forms    No documentation.           John Neri

## 2020-10-01 NOTE — CONSULTS
"Pharmacy Dosing Service  Pharmacokinetics  Vancomycin Initial Evaluation    Assessment/Action/Plan:  Initiated Vancomycin 1000 mg IVPB every 24 hours. Vancomycin levels not ordered at this time. Patient is also on Zosyn (extended infusion).  Current vancomycin end date: 10/7/20. Pharmacy will monitor renal function and adjust dose accordingly.     Subjective:  Zuleyma Ernst is a 80 y.o. female with a Vancomycin \"Pharmacy to Dose\" consult for the treatment of PNA .  Day 1 of therapy    Objective:  Ht:  ; Wt: 53.6 kg (118 lb 2 oz)  Estimated Creatinine Clearance: 45.9 mL/min (by C-G formula based on SCr of 0.69 mg/dL).   Creatinine   Date Value Ref Range Status   10/01/2020 0.69 0.57 - 1.00 mg/dL Final   07/10/2020 0.58 0.57 - 1.00 mg/dL Final   07/09/2020 0.56 (L) 0.57 - 1.00 mg/dL Final   05/20/2020 0.5 0.5 - 0.9 mg/dL Final   12/31/2019 <0.5 0.5 - 0.9 mg/dL Final      Lab Results   Component Value Date    WBC 4.38 10/01/2020    WBC 7.61 07/10/2020    WBC 11.59 (H) 07/09/2020      Baseline culture results:  Microbiology Results (last 10 days)       Procedure Component Value - Date/Time    COVID PRE-OP / PRE-PROCEDURE SCREENING ORDER (NO ISOLATION) - Swab, Nasal Cavity [[id number]]  (Normal) Collected: 10/01/20 0207    Lab Status: Final result Specimen: Swab from Nasal Cavity Updated: 10/01/20 0236    Narrative:      The following orders were created for panel order COVID PRE-OP / PRE-PROCEDURE SCREENING ORDER (NO ISOLATION) - Swab, Nasal Cavity.  Procedure                               Abnormality         Status                     ---------                               -----------         ------                     COVID-19, [facility]BBOTT IN-HOUS...[[id number]]  Normal              Final result                 Please view results for these tests on the individual orders.    COVID-19, [facility]OTT IN-HOUSE,NP Swab (NO TRANSPORT MEDIA) 2 HR TAT - Swab, Nasal Cavity [[id number]]  (Normal) Collected: 10/01/20 0207    Lab Status: " Final result Specimen: Swab from Nasal Cavity Updated: 10/01/20 0236     COVID19 Not Detected    Narrative:      Fact sheet for providers: https://www.fda.gov/media/816891/download     Fact sheet for patients: https://www.fda.gov/media/586568/download            Tiff Varma RPH  10/01/20 06:03 CDT

## 2020-10-01 NOTE — PLAN OF CARE
Problem: Adult Inpatient Plan of Care  Goal: Plan of Care Review  Outcome: Ongoing, Progressing  Flowsheets (Taken 10/1/2020 0912)  Plan of Care Reviewed With: (NurseDolly) other (see comments)  Outcome Summary: Clinical bedside swallow evaluation attempted.Pt only responsive to painful stimuli. SLP presented ice to pt's lips. She exhibited minimal lip pursing 1x, but no other responses or attempt to remove water residue from her lips. Presented empty spoon to pt's lips 1x with no response. During admission at this facility in July of this year the pt was placed on a pureed diet and honey thick liquids. NurseDolly to check with nursing facility about pt's baseline function and most recent diet recommendations. If pt was still on a pureed diet and honey thick liquids, then considerations need to be made for aggressiveness of care desired as it is suspected that aspiration may have contributed to her current poor respiratory state. Recommend continuing NPO at this time, if pt doesn't improve may need to consider PO diet for comfort (accepting potential risk of aspiration) vs alternate nutrition if more aggressive measures are desired. Meds alternate route. Oral care at least BID and PRN. SLP will continue to follow.

## 2020-10-01 NOTE — CONSULTS
Palliative Care Initial Consult   Attending Physician: Glenn Harding MD  Referring Provider: Glenn Harding MD    Reason for Referral: assistance with clarification of goals of care  Family/Support: No family present    Code Status and Medical Interventions:   Ordered at: 10/01/20 0422     Limited Support to NOT Include:    Intubation    Vasopressors    Dialysis     Level Of Support Discussed With:    Health Care Surrogate     Code Status:    No CPR     Medical Interventions (Level of Support Prior to Arrest):    Limited     Goals of Care: TBD.    HPI:   80 y.o. female with past medical history significant for Alzheimer's dementia, dysphagia, pulmonary embolus-on anticoagulation, atrial fibrillation she is a resident of [facility]. Patient presented to [facility] on 10/1/2020 related to shortness of breath.  She was also found to have respiratory distress with hypoxia, chest x-ray reveals right lower infiltrate consistent with pneumonia with concerns for probable aspiration pneumonia.  He was also found to be in atrial fibrillation with RVR and started on a Cardizem drip.  Opiates are on hold due to lethargy. She is currently NPO.  Patient has an active power of , Jonna Thompson who is also her daughter-in-law.  A call has been placed and voicemail left.  Awaiting callback.     Review of Systems   Unable to perform ROS: dementia     1- Pain Assessment  PAINAD Breathin-->occasional labored breathing, short period of hyperventilation  PAINAD Negative Vocalization: 1-->occasional moan/groan, low speech, negative/disapproving quality  PAINAD Facial Expression: 0-->smiling or inexpressive  PAINAD Body Language: 0-->relaxed  PAINAD Consolability: 1-->distracted or reassured by voice/touch  PAINAD Score: 3    Past Medical History:   Diagnosis Date   • Alzheimer disease (CMS/HCC)     Per Nursing home paperwork   • Alzheimer's dementia (CMS/HCC)    • Anxiety disorder     per Nursing  home paperwork   • Arthritis    • Bipolar 1 disorder (CMS/HCC)     Nursing home paperwork   • Chronic back pain     per Nursing home paperwork   • Constipation     Per nursing home paperwork   • Dementia (CMS/HCC)     per nursing home paperwork   • Dysphagia    • Hyperlipidemia    • Hypertension    • Major depressive disorder     per Nursing home paperwork   • Osteoarthritis     per nursing home paperwork.   • Pulmonary air embolism (CMS/HCC)     Per nursing home paperwork     History reviewed. No pertinent surgical history.  Social History     Socioeconomic History   • Marital status:      Spouse name: Not on file   • Number of children: Not on file   • Years of education: Not on file   • Highest education level: Not on file   Tobacco Use   • Smoking status: Never Smoker   • Smokeless tobacco: Never Used   Substance and Sexual Activity   • Alcohol use: Not Currently   • Drug use: Not Currently   • Sexual activity: Defer       Current Facility-Administered Medications   Medication Dose Route Frequency Provider Last Rate Last Dose   • acetaminophen (TYLENOL) 160 MG/5ML solution 650 mg  650 mg Oral Q4H PRN Shaun Mcknight MD        Or   • acetaminophen (TYLENOL) suppository 650 mg  650 mg Rectal Q4H PRN Shaun Mcknight MD       • acetaminophen (TYLENOL) tablet 650 mg  650 mg Oral Q4H PRN Shaun Mcknight MD       • albuterol (PROVENTIL) nebulizer solution 0.083% 2.5 mg/3mL  2.5 mg Nebulization Once PRN Shaun Mcknight MD       • dextrose (D50W) 25 g/ 50mL Intravenous Solution 25 g  25 g Intravenous Q15 Min PRN Shaun Mcknight MD       • dextrose (GLUTOSE) oral gel 15 g  15 g Oral Q15 Min PRN Shaun Mcknight MD       • dilTIAZem (CARDIZEM) 125 mg in sodium chloride 0.9 % 125 mL (1 mg/mL) infusion  5-15 mg/hr Intravenous Continuous Shaun Mcknight MD 12.5 mL/hr at 10/01/20 0315 12.5 mg/hr at 10/01/20 0315   • enoxaparin (LOVENOX) syringe 50 mg  1 mg/kg Subcutaneous Q12H Glenn Harding MD        • famotidine (PEPCID) injection 20 mg  20 mg Intravenous Daily Glenn Harding MD   20 mg at 10/01/20 1254   • glucagon (human recombinant) (GLUCAGEN DIAGNOSTIC) injection 1 mg  1 mg Subcutaneous Q15 Min PRN Shaun Mcknight MD       • insulin lispro (humaLOG) injection 2-7 Units  2-7 Units Subcutaneous TID AC Shaun Mcknight MD       • ipratropium-albuterol (DUO-NEB) nebulizer solution 3 mL  3 mL Nebulization 4x Daily - RT Shaun Mcknight MD   3 mL at 10/01/20 1124   • nitroglycerin (NITROSTAT) SL tablet 0.4 mg  0.4 mg Sublingual Q5 Min PRN Shaun Mcknight MD       • ondansetron (ZOFRAN) tablet 4 mg  4 mg Oral Q6H PRN Shaun Mcknight MD        Or   • ondansetron (ZOFRAN) injection 4 mg  4 mg Intravenous Q6H PRN Shaun Mcknight MD       • piperacillin-tazobactam (ZOSYN) 3.375 g in iso-osmotic dextrose 50 ml (premix)  3.375 g Intravenous Q8H Shaun Mcknight MD       • sodium chloride 0.45 % infusion  75 mL/hr Intravenous Continuous Glenn Harding MD 75 mL/hr at 10/01/20 1255 75 mL/hr at 10/01/20 1255   • sodium chloride 0.9 % flush 10 mL  10 mL Intravenous PRN Shaun Mcknight MD       • sodium chloride 0.9 % flush 10 mL  10 mL Intravenous Q12H Shaun Mcknight MD       • sodium chloride 0.9 % flush 10 mL  10 mL Intravenous PRN Shaun Mcknight MD       • vancomycin (VANCOCIN) in iso-osmotic dextrose IVPB 1 g (premix) 200 mL  1,000 mg Intravenous Q24H Shaun Mcknight MD   1,000 mg at 10/01/20 1249     dilTIAZem, 5-15 mg/hr, Last Rate: 12.5 mg/hr (10/01/20 0315)  sodium chloride, 75 mL/hr, Last Rate: 75 mL/hr (10/01/20 1255)      •  [DISCONTINUED] acetaminophen **OR** acetaminophen **OR** acetaminophen  •  acetaminophen  •  albuterol  •  dextrose  •  dextrose  •  glucagon (human recombinant)  •  nitroglycerin  •  ondansetron **OR** ondansetron  •  [COMPLETED] Insert peripheral IV **AND** sodium chloride  •  sodium chloride    Allergies   Allergen Reactions   • Sulfa Antibiotics Unknown  - High Severity   • Ultram [Tramadol Hcl] Unknown - High Severity     Current medication reviewed for route, type, dose and frequency and are current per MAR at time of dictation.      Intake/Output Summary (Last 24 hours) at 10/1/2020 1430  Last data filed at 10/1/2020 0400  Gross per 24 hour   Intake 2100 ml   Output --   Net 2100 ml       Physical Exam:    Diagnostics: Reviewed  /45 (BP Location: Right arm, Patient Position: Lying)   Pulse 102   Temp 97.5 °F (36.4 °C) (Axillary)   Resp 22   Wt 53.6 kg (118 lb 2 oz)   SpO2 96%   BMI 20.93 kg/m²     Vitals signs and nursing note reviewed.   Constitutional:       Appearance: Frail. Chronically ill-appearing and acutely ill-appearing.   Eyes:      General: Lids are normal.   HENT:      Head: Normocephalic and atraumatic.   Neck:      Musculoskeletal: Neck supple.   Pulmonary:      Effort: Pulmonary effort is normal.   Cardiovascular:      Tachycardia present.   Edema:     Peripheral edema present.  Abdominal:      General: Bowel sounds are decreased.   Musculoskeletal: Normal range of motion.   Skin:     General: Skin is warm and dry.   Neurological:      Mental Status: Lethargic.      Comments: Dementia       Patient status: Disease state: Deteriorating despite treatments.  Functional status: Palliative Performance Scale Score: Performance 10% based on the following measures: Ambulation: Totally bed bound, Activity and Evidence of Disease: Unable to do any work, extensive evidence of disease, Self-Care: Total care required,  Intake: Mouth care only, LOC: Drowsy or comatose   Nutritional status: Albumin 3.60. Body mass index is 20.93 kg/m².         Family support: The patient receives support from her son and extended family..  Advance Directives: Advance directive on file     POA/Healthcare surrogate-Jonna Thompson-POA/daughter-in-law.    Impression/Problem List:    1.  Alzheimer's dementia  2.  Pneumonia of right lower lobe, concerns for aspiration  3.   Acute respiratory failure with hypoxia  4.  Paroxysmal atrial fibrillation with RVR  5.  Sepsis  6.  Hypertension  7.  Chronic back pain  8.  History of pulmonary embolism  9.  Advanced age  10.  Aphasia    Recommendations/Plan:  1. plan: Goals of care include CODE STATUS no CPR/limited interventions per attending.    2.  Palliative care encounter  -Attempt made to contact POA/daughter-in-law, Jonna Thompson.  No answer, left voicemail, awaiting callback.  Will ask nursing to assist with setting up care conference either in person or via telephone for tomorrow morning around 10:30 AM.      Thank you for this consult and allowing us to participate in patient's plan of care. Palliative Care Team will continue to follow patient.     Time spent: 69 minutes spent reviewing medical and medication records, assessing and examining patient, discussing with family, answering questions, providing some guidance about a plan and documentation of care, and coordinating care with other healthcare members, with > 50% time spent face to face.     Casie Schafer, APRN  10/1/2020

## 2020-10-01 NOTE — PLAN OF CARE
Problem: Adult Inpatient Plan of Care  Goal: Plan of Care Review  Outcome: Ongoing, Progressing  Flowsheets (Taken 10/1/2020 0558)  Progress: improving  Plan of Care Reviewed With: patient  Outcome Summary: Pt arrived from ED this am with pneumonia. Pt on venturi mask. Cont pulse ox in place. Speech mumbled, unable to understand. Unable to remain alert for swallow study. Cardizem drip infusing. NS infusing at 100ml/hr. Echo today. Safety maintained. Continue to monitor.

## 2020-10-01 NOTE — ED NOTES
PATIENT WITH FENTANYL PATCH IN PLACE UPON ARRIVAL TO RIGHT CHEST.      Daja Pavon, RN  10/01/20 0319

## 2020-10-01 NOTE — PROGRESS NOTES
Continued Stay Note  [facility]     Patient Name: Zuleyma Ernst  MRN: [id number]  Today's Date: 10/1/2020    Admit Date: 10/1/2020    Discharge Plan     Row Name 10/01/20 1030       Plan    Plan Comments  Called and left Yasmeen at [facility] [phone] a message to see if pt has a bed hold. Will await returned phone call.  (Pended)         Discharge Codes    No documentation.             John Neri

## 2020-10-01 NOTE — THERAPY EVALUATION
Acute Care - Speech Language Pathology   Swallow Initial Evaluation [facility]     Patient Name: Zuleyma Ernst  : 1940  MRN: [id number]  Today's Date: 10/1/2020               Admit Date: 10/1/2020  Clinical bedside swallow evaluation attempted.Pt only responsive to painful stimuli. SLP presented ice to pt's lips. She exhibited minimal lip pursing 1x, but no other responses or attempt to remove water residue from her lips. Presented empty spoon to pt's lips 1x with no response. During admission at this facility in July of this year the pt was placed on a pureed diet and honey thick liquids. Nurse, Dolly to check with nursing facility about pt's baseline function and most recent diet recommendations. If pt was still on a pureed diet and honey thick liquids, then considerations need to be made for aggressiveness of care desired as it is suspected that aspiration may have contributed to her current poor respiratory state.     Recommend:  1. Continuing NPO at this time, if pt doesn't improve may need to consider PO diet for comfort (accepting potential risk of aspiration) vs alternate nutrition if more aggressive measures are desired.   2. Meds alternate route.   3. Oral care at least BID and PRN.   4. SLP will continue to follow.  Madai Pérez CCC-SLP 10/1/2020 09:15 CDT    Visit Dx:     ICD-10-CM ICD-9-CM   1. Pneumonia of right lower lobe due to infectious organism  J18.9 486   2. Dysphagia, unspecified type  R13.10 787.20     Patient Active Problem List   Diagnosis   • Left lower lobe pneumonia possible aspiration   • Hypertension   • Bipolar 1 disorder (CMS/[facility])   • Chronic back pain   • Alzheimer disease (CMS/[facility])   • Sepsis (CMS/[facility])   • Proteus mirabilis infection (UTI)   • Debility   • Pneumonia of right lower lobe due to infectious organism   • Dementia with behavioral disturbance (CMS/[facility])   • Acute pulmonary embolism without acute cor pulmonale (CMS/[facility])   • History of pulmonary embolism   •  Acute respiratory failure with hypoxia (CMS/HCC)   • Paroxysmal atrial fibrillation with rapid ventricular response (CMS/HCC)     Past Medical History:   Diagnosis Date   • Alzheimer disease (CMS/HCC)     Per Nursing home paperwork   • Alzheimer's dementia (CMS/HCC)    • Anxiety disorder     per Nursing home paperwork   • Arthritis    • Bipolar 1 disorder (CMS/HCC)     Nursing home paperwork   • Chronic back pain     per Nursing home paperwork   • Constipation     Per nursing home paperwork   • Dementia (CMS/HCC)     per nursing home paperwork   • Dysphagia    • Hyperlipidemia    • Hypertension    • Major depressive disorder     per Nursing home paperwork   • Osteoarthritis     per nursing home paperwork.   • Pulmonary air embolism (CMS/HCC)     Per nursing home paperwork     History reviewed. No pertinent surgical history.     SWALLOW EVALUATION (last 72 hours)      SLP Adult Swallow Evaluation     Row Name 10/01/20 0827                   Rehab Evaluation    Document Type  evaluation  -MB        Subjective Information  -- unable to respond  -MB        Patient Observations  decreased LOC;unable to respond;unresponsive  -MB        Patient/Family/Caregiver Comments/Observations  No family present  -MB           General Information    Patient Profile Reviewed  yes  -MB        Pertinent History Of Current Problem  SOA, Alzheimer's dementia, Bipolar disorder, dysphagia, CXR: airspace disease in right lung base concerning for pneumonia (possible aspiration).   -MB        Current Method of Nutrition  NPO  -MB        Precautions/Limitations, Vision  other (see comments) Kept eyes closed  -MB        Precautions/Limitations, Hearing  other (see comments) unable to assess  -MB        Prior Level of Function-Communication  cognitive-linguistic impairment;other (see comments) dementia  -MB        Prior Level of Function-Swallowing  puree;honey thick liquids;other (see comments) during last admission at this facility  -MB         Plans/Goals Discussed with  other (see comments) Dolly Morrison  -MB        Barriers to Rehab  medically complex;previous functional deficit;cognitive status  -MB        Patient's Goals for Discharge  patient could not state  -MB           Pain    Additional Documentation  Pain Scale: FACES Pre/Post-Treatment (Group)  -MB           Pain Scale: FACES Pre/Post-Treatment    Pain: FACES Scale, Pretreatment  2-->hurts little bit  -MB           Oral Motor Structure and Function    Dentition Assessment  other (see comments) unable to view  -MB        Mucosal Quality  other (see comments) unable to assess  -MB           Oral Musculature and Cranial Nerve Assessment    Oral Motor General Assessment  unable to assess  -MB           General Eating/Swallowing Observations    Respiratory Support Currently in Use  oxygen mask  -MB        Positioning During Eating  semi-reclined  -MB           Clinical Swallow Eval    Oral Prep Phase  impaired  -MB        Clinical Swallow Evaluation Summary  Pt only responsive to painful stimuli. SLP presented ice to pt's lips. She exhibited minimal lip pursing 1x, but no other responses or attempt to remove water residue from her lips. Presented empty spoon to pt's lips 1x with no response. During admission at this facility in July of this year the pt was placed on a pureed diet and honey thick liquids. NurseDolly to check with nursing facility about pt's baseline function and most recent diet recommendations. If pt was still on a pureed diet and honey thick liquids, then considerations need to be made for aggressiveness of care desired as it is suspected that aspiration may have contributed to her current poor respiratory state.   -MB           Oral Prep Concerns    Oral Prep Concerns  reduced lip opening  -MB        Reduced Lip Opening  other (see comments) ice and empty spoon  -MB           Clinical Impression    SLP Swallowing Diagnosis  suspected pharyngeal dysphagia  -MB        Functional  Impact  risk of aspiration/pneumonia;risk of malnutrition;risk of dehydration  -MB        Rehab Potential/Prognosis, Swallowing  re-evaluate goals as necessary  -MB        Swallow Criteria for Skilled Therapeutic Interventions Met  demonstrates skilled criteria  -MB           Recommendations    Therapy Frequency (Swallow)  PRN  -MB        Predicted Duration Therapy Intervention (Days)  until discharge  -MB        SLP Diet Recommendation  NPO  -MB        Recommended Diagnostics  reassess via clinical swallow evaluation  -MB        Oral Care Recommendations  Oral Care BID/PRN  -MB        SLP Rec. for Method of Medication Administration  meds via alternate route  -MB        Anticipated Discharge Disposition (SLP)  skilled nursing [facility]  -MB           Swallow Goals (SLP)    Oral Nutrition/Hydration Goal Selection (SLP)  oral nutrition/hydration, SLP goal 1  -MB           Oral Nutrition/Hydration Goal 1 (SLP)    Oral Nutrition/Hydration Goal 1, SLP  Pt will tolerate least restrictive diet with no overt s/s of aspiration.   -MB        Time Frame (Oral Nutrition/Hydration Goal 1, SLP)  by discharge  -MB        Barriers (Oral Nutrition/Hydration Goal 1, SLP)  n/a  -MB        Progress/Outcomes (Oral Nutrition/Hydration Goal 1, SLP)  goal ongoing  -MB          User Key  (r) = Recorded By, (t) = Taken By, (c) = Cosigned By    Initials Name Effective Dates    Madai Wynne, CCC-SLP 08/02/16 -           EDUCATION  The patient has been educated in the following areas:   Dysphagia (Swallowing Impairment).    SLP Recommendation and Plan  SLP Swallowing Diagnosis: suspected pharyngeal dysphagia  SLP Diet Recommendation: NPO     SLP Rec. for Method of Medication Administration: meds via alternate route        Recommended Diagnostics: reassess via clinical swallow evaluation  Swallow Criteria for Skilled Therapeutic Interventions Met: demonstrates skilled criteria  Anticipated Discharge Disposition (SLP): skilled nursing  facility  Rehab Potential/Prognosis, Swallowing: re-evaluate goals as necessary  Therapy Frequency (Swallow): PRN  Predicted Duration Therapy Intervention (Days): until discharge                         Plan of Care Reviewed With: other (see comments)(NurseDolly)  Outcome Summary: Clinical bedside swallow evaluation attempted.Pt only responsive to painful stimuli. SLP presented ice to pt's lips. She exhibited minimal lip pursing 1x, but no other responses or attempt to remove water residue from her lips. Presented empty spoon to pt's lips 1x with no response. During admission at this facility in July of this year the pt was placed on a pureed diet and honey thick liquids. NurseDolly to check with nursing facility about pt's baseline function and most recent diet recommendations. If pt was still on a pureed diet and honey thick liquids, then considerations need to be made for aggressiveness of care desired as it is suspected that aspiration may have contributed to her current poor respiratory state. Recommend continuing NPO at this time, if pt doesn't improve may need to consider PO diet for comfort (accepting potential risk of aspiration) vs alternate nutrition if more aggressive measures are desired. Meds alternate route. Oral care at least BID and PRN. SLP will continue to follow.    SLP GOALS     Row Name 10/01/20 0827             Oral Nutrition/Hydration Goal 1 (SLP)    Oral Nutrition/Hydration Goal 1, SLP  Pt will tolerate least restrictive diet with no overt s/s of aspiration.   -MB      Time Frame (Oral Nutrition/Hydration Goal 1, SLP)  by discharge  -MB      Barriers (Oral Nutrition/Hydration Goal 1, SLP)  n/a  -MB      Progress/Outcomes (Oral Nutrition/Hydration Goal 1, SLP)  goal ongoing  -MB        User Key  (r) = Recorded By, (t) = Taken By, (c) = Cosigned By    Initials Name Provider Type    Madai Wynne, CCC-SLP Speech and Language Pathologist             Time Calculation:   Time  Calculation- SLP     Row Name 10/01/20 0915             Time Calculation- SLP    SLP Start Time  0827  -MB      SLP Stop Time  0915  -MB      SLP Time Calculation (min)  48 min  -MB      SLP Received On  10/01/20  -MB      SLP Goal Re-Cert Due Date  10/11/20  -MB        User Key  (r) = Recorded By, (t) = Taken By, (c) = Cosigned By    Initials Name Provider Type    Madai Wynne CCC-SLP Speech and Language Pathologist          Therapy Charges for Today     Code Description Service Date Service Provider Modifiers Qty    [id number]  ST EVAL ORAL PHARYNG SWALLOW 3 10/1/2020 Madai Pérez CCC-SLP GN 1               JOSE ALFREDO Raman  10/1/2020

## 2020-10-02 VITALS
BODY MASS INDEX: 21.48 KG/M2 | DIASTOLIC BLOOD PRESSURE: 59 MMHG | TEMPERATURE: 98.5 F | OXYGEN SATURATION: 95 % | WEIGHT: 121.2 LBS | HEART RATE: 102 BPM | SYSTOLIC BLOOD PRESSURE: 112 MMHG | RESPIRATION RATE: 18 BRPM

## 2020-10-02 LAB
ALBUMIN SERPL-MCNC: 2.7 G/DL (ref 3.5–5.2)
ALBUMIN/GLOB SERPL: 0.9 G/DL
ALP SERPL-CCNC: 96 U/L (ref 39–117)
ALT SERPL W P-5'-P-CCNC: 22 U/L (ref 1–33)
ANION GAP SERPL CALCULATED.3IONS-SCNC: 11 MMOL/L (ref 5–15)
AST SERPL-CCNC: 22 U/L (ref 1–32)
BACTERIA BLD CULT: ABNORMAL
BILIRUB SERPL-MCNC: 0.9 MG/DL (ref 0–1.2)
BOTTLE TYPE: ABNORMAL
BUN SERPL-MCNC: 15 MG/DL (ref 8–23)
BUN/CREAT SERPL: 26.8 (ref 7–25)
CALCIUM SPEC-SCNC: 8.9 MG/DL (ref 8.6–10.5)
CHLORIDE SERPL-SCNC: 111 MMOL/L (ref 98–107)
CO2 SERPL-SCNC: 24 MMOL/L (ref 22–29)
CREAT SERPL-MCNC: 0.56 MG/DL (ref 0.57–1)
DEPRECATED RDW RBC AUTO: 49.6 FL (ref 37–54)
ERYTHROCYTE [DISTWIDTH] IN BLOOD BY AUTOMATED COUNT: 14.9 % (ref 12.3–15.4)
GFR SERPL CREATININE-BSD FRML MDRD: 104 ML/MIN/1.73
GLOBULIN UR ELPH-MCNC: 3 GM/DL
GLUCOSE SERPL-MCNC: 169 MG/DL (ref 65–99)
HCT VFR BLD AUTO: 31.9 % (ref 34–46.6)
HGB BLD-MCNC: 10.3 G/DL (ref 12–15.9)
LYMPHOCYTES # BLD MANUAL: 1.26 10*3/MM3 (ref 0.7–3.1)
LYMPHOCYTES NFR BLD MANUAL: 12 % (ref 19.6–45.3)
MCH RBC QN AUTO: 29.5 PG (ref 26.6–33)
MCHC RBC AUTO-ENTMCNC: 32.3 G/DL (ref 31.5–35.7)
MCV RBC AUTO: 91.4 FL (ref 79–97)
NEUTROPHILS # BLD AUTO: 9.22 10*3/MM3 (ref 1.7–7)
NEUTROPHILS NFR BLD MANUAL: 63 % (ref 42.7–76)
NEUTS BAND NFR BLD MANUAL: 25 % (ref 0–5)
NRBC SPEC MANUAL: 1 /100 WBC (ref 0–0.2)
PLAT MORPH BLD: NORMAL
PLATELET # BLD AUTO: 161 10*3/MM3 (ref 140–450)
PMV BLD AUTO: 10.1 FL (ref 6–12)
POLYCHROMASIA BLD QL SMEAR: ABNORMAL
POTASSIUM SERPL-SCNC: 3.4 MMOL/L (ref 3.5–5.2)
PROT SERPL-MCNC: 5.7 G/DL (ref 6–8.5)
RBC # BLD AUTO: 3.49 10*6/MM3 (ref 3.77–5.28)
SODIUM SERPL-SCNC: 146 MMOL/L (ref 136–145)
TOXIC GRANULATION: ABNORMAL
WBC # BLD AUTO: 10.48 10*3/MM3 (ref 3.4–10.8)

## 2020-10-02 PROCEDURE — 80053 COMPREHEN METABOLIC PANEL: CPT | Performed by: FAMILY MEDICINE

## 2020-10-02 PROCEDURE — 25010000002 PIPERACILLIN SOD-TAZOBACTAM PER 1 G: Performed by: INTERNAL MEDICINE

## 2020-10-02 PROCEDURE — 85025 COMPLETE CBC W/AUTO DIFF WBC: CPT | Performed by: FAMILY MEDICINE

## 2020-10-02 PROCEDURE — 94799 UNLISTED PULMONARY SVC/PX: CPT

## 2020-10-02 PROCEDURE — 99498 ADVNCD CARE PLAN ADDL 30 MIN: CPT | Performed by: CLINICAL NURSE SPECIALIST

## 2020-10-02 PROCEDURE — 25010000002 MORPHINE SULFATE (PF) 2 MG/ML SOLUTION: Performed by: FAMILY MEDICINE

## 2020-10-02 PROCEDURE — 85007 BL SMEAR W/DIFF WBC COUNT: CPT | Performed by: FAMILY MEDICINE

## 2020-10-02 PROCEDURE — 99233 SBSQ HOSP IP/OBS HIGH 50: CPT | Performed by: CLINICAL NURSE SPECIALIST

## 2020-10-02 PROCEDURE — 99497 ADVNCD CARE PLAN 30 MIN: CPT | Performed by: CLINICAL NURSE SPECIALIST

## 2020-10-02 RX ORDER — MORPHINE SULFATE 2 MG/ML
2 INJECTION, SOLUTION INTRAMUSCULAR; INTRAVENOUS ONCE
Status: COMPLETED | OUTPATIENT
Start: 2020-10-02 | End: 2020-10-02

## 2020-10-02 RX ORDER — ACETAMINOPHEN 160 MG/5ML
650 SOLUTION ORAL EVERY 4 HOURS PRN
Start: 2020-10-02

## 2020-10-02 RX ORDER — FENTANYL 25 UG/H
1 PATCH TRANSDERMAL
Status: DISCONTINUED | OUTPATIENT
Start: 2020-10-02 | End: 2020-10-02 | Stop reason: HOSPADM

## 2020-10-02 RX ORDER — HALOPERIDOL 2 MG/ML
2 SOLUTION ORAL EVERY 6 HOURS PRN
Status: DISCONTINUED | OUTPATIENT
Start: 2020-10-02 | End: 2020-10-02 | Stop reason: HOSPADM

## 2020-10-02 RX ORDER — MORPHINE SULFATE 20 MG/ML
5 SOLUTION ORAL
Status: DISCONTINUED | OUTPATIENT
Start: 2020-10-02 | End: 2020-10-02 | Stop reason: HOSPADM

## 2020-10-02 RX ORDER — RISPERIDONE 1 MG/ML
0.25 SOLUTION ORAL EVERY 12 HOURS SCHEDULED
Status: DISCONTINUED | OUTPATIENT
Start: 2020-10-02 | End: 2020-10-02 | Stop reason: HOSPADM

## 2020-10-02 RX ORDER — MORPHINE SULFATE 20 MG/ML
5 SOLUTION ORAL
Qty: 30 ML | Refills: 0 | Status: SHIPPED | OUTPATIENT
Start: 2020-10-02 | End: 2020-10-12

## 2020-10-02 RX ORDER — IPRATROPIUM BROMIDE AND ALBUTEROL SULFATE 2.5; .5 MG/3ML; MG/3ML
3 SOLUTION RESPIRATORY (INHALATION) EVERY 4 HOURS PRN
Status: DISCONTINUED | OUTPATIENT
Start: 2020-10-02 | End: 2020-10-02 | Stop reason: HOSPADM

## 2020-10-02 RX ORDER — RISPERIDONE 1 MG/ML
0.25 SOLUTION ORAL EVERY 12 HOURS SCHEDULED
Start: 2020-10-02

## 2020-10-02 RX ORDER — ACETAMINOPHEN 650 MG/1
650 SUPPOSITORY RECTAL EVERY 4 HOURS PRN
Start: 2020-10-02

## 2020-10-02 RX ORDER — HALOPERIDOL 2 MG/ML
2 SOLUTION ORAL EVERY 6 HOURS PRN
Start: 2020-10-02

## 2020-10-02 RX ORDER — FENTANYL 25 UG/H
1 PATCH TRANSDERMAL
Qty: 5 PATCH | Refills: 0 | Status: SHIPPED | OUTPATIENT
Start: 2020-10-05 | End: 2020-10-18

## 2020-10-02 RX ADMIN — SODIUM CHLORIDE 75 ML/HR: 4.5 INJECTION, SOLUTION INTRAVENOUS at 03:38

## 2020-10-02 RX ADMIN — DILTIAZEM HYDROCHLORIDE 5 MG/HR: 5 INJECTION INTRAVENOUS at 02:33

## 2020-10-02 RX ADMIN — RISPERIDONE 0.25 MG: 1 SOLUTION ORAL at 10:34

## 2020-10-02 RX ADMIN — MORPHINE SULFATE 2 MG: 2 INJECTION, SOLUTION INTRAMUSCULAR; INTRAVENOUS at 10:55

## 2020-10-02 RX ADMIN — IPRATROPIUM BROMIDE AND ALBUTEROL SULFATE 3 ML: 2.5; .5 SOLUTION RESPIRATORY (INHALATION) at 07:33

## 2020-10-02 RX ADMIN — MORPHINE SULFATE 5 MG: 20 SOLUTION ORAL at 08:55

## 2020-10-02 RX ADMIN — FENTANYL 1 PATCH: 25 PATCH TRANSDERMAL at 09:45

## 2020-10-02 RX ADMIN — ACETAMINOPHEN 650 MG: 650 SUPPOSITORY RECTAL at 00:06

## 2020-10-02 RX ADMIN — TAZOBACTAM SODIUM AND PIPERACILLIN SODIUM 3.38 G: 375; 3 INJECTION, SOLUTION INTRAVENOUS at 04:39

## 2020-10-02 RX ADMIN — HALOPERIDOL 2 MG: 2 SOLUTION ORAL at 10:33

## 2020-10-02 NOTE — PROGRESS NOTES
Case Management Discharge Note      Final Note: NOTIFIED KATHERINE AT [facility] OF PT'S D/C BACK TO FACILITY WITH HOSPICE/COMFORT CARE.         Selected Continued Care - Discharged on 10/2/2020 Admission date: 10/1/2020 - Discharge disposition: Hospice/Medical Facility (DC - External)    Destination    No services have been selected for the patient.              Durable Medical Equipment    No services have been selected for the patient.              Dialysis/Infusion    No services have been selected for the patient.              Home Medical Care    No services have been selected for the patient.              Therapy    No services have been selected for the patient.              Community Resources    No services have been selected for the patient.                Selected Continued Care - Prior Encounters Includes selections from prior encounters from 7/3/2020 to 10/2/2020    Discharged on 7/10/2020 Admission date: 7/7/2020 - Discharge disposition: Skilled Nursing Facility (DC - External)    Destination     Service Provider Selected Services Address Phone Fax    [facility]  Sk[facility] [address] [place] [id number] [phone]                         Final Discharge Disposition Code: 04 - intermediate care facility

## 2020-10-02 NOTE — PLAN OF CARE
Problem: Adult Inpatient Plan of Care  Goal: Plan of Care Review  Outcome: Ongoing, Not Progressing  Flowsheets (Taken 10/2/2020 0602)  Progress: no change  Plan of Care Reviewed With: patient  Outcome Summary: VSS. no c/o pain. pt yells out frequently. continue IV atx. IVF 75ml/hr. pt responds to pain. NPO for failed dysphagia screening. pt went afibb 0051, rate 115-130. started cardizem drip back at 5, pt converted to sinus, <120BPM,  bp dropped. stopped cardizem. tolerating well. NSR 89 on tele now.  safety maintained. bed alarm set. palliative meeting w/ POA this am. continue to monitor.

## 2020-10-02 NOTE — THERAPY DISCHARGE NOTE
Acute Care - Speech Language Pathology Discharge Summary  [facility]       Patient Name: Zuleyma Ernst  : 1940  MRN: [id number]    Today's Date: 10/2/2020                   Admit Date: 10/1/2020      SLP Recommendation and Plan  SLP follow up completed, s/p initial bedside swallowing evaluation completed on 10/01/2020. Spoke with RNSushila, as intervention status now indicates comfort measures. RN stated pt was not appropriate for SLP discharge education re: aspiration risks with PO intake if PO is provided for comfort, stating pt hasn't been alert and has been moaning throughout the morning. If MD chooses to allow pt to have PO for comfort knowing the risks for aspiration, the safest PO diet would likely be that of a pureed diet consistency with honey thick liquids, however, safety cannot be ensured and all involved parties should be aware that the pt is at a high risk for aspiration, pneumonia, respiratory failure, or fatality if aspiration occurs. Continued SLP services are no longer warranted. MD to reconsult if changes or new concerns arise. Thanks!  Brenna Costello CCC-SLP 10/2/2020 10:54 CDT    Visit Dx:    ICD-10-CM ICD-9-CM   1. Pneumonia of right lower lobe due to infectious organism  J18.9 486   2. Dysphagia, unspecified type  R13.10 787.20         Time Calculation- SLP     Row Name 10/02/20 1053             Time Calculation- SLP    SLP Start Time  1040  -TM      SLP Stop Time  1050  -TM      SLP Time Calculation (min)  10 min  -TM      SLP Non-Billable Time (min)  10 min  -TM        User Key  (r) = Recorded By, (t) = Taken By, (c) = Cosigned By    Initials Name Provider Type    TM Brenna Costello CCC-SLP Speech and Language Pathologist            SLP GOALS     Row Name 10/02/20 1000 10/01/20 0827          Oral Nutrition/Hydration Goal 1 (SLP)    Oral Nutrition/Hydration Goal 1, SLP  Pt will tolerate least restrictive diet with no overt s/s of aspiration.   -TM  Pt will tolerate least  restrictive diet with no overt s/s of aspiration.   -MB     Time Frame (Oral Nutrition/Hydration Goal 1, SLP)  by discharge  -TM  by discharge  -MB     Barriers (Oral Nutrition/Hydration Goal 1, SLP)  n/a  -TM  n/a  -MB     Progress/Outcomes (Oral Nutrition/Hydration Goal 1, SLP)  goal not met;goal no longer appropriate  -TM  goal ongoing  -MB       User Key  (r) = Recorded By, (t) = Taken By, (c) = Cosigned By    Initials Name Provider Type    Madai Wynen, CCC-SLP Speech and Language Pathologist     Brenna Costello CCC-SLP Speech and Language Pathologist                  SLP Discharge Summary  Anticipated Discharge Disposition (SLP): unknown(Comfort measures)  Reason for Discharge: no further expectation of functional progress  Progress Toward Achieving Short/long Term Goals: unable to make functional progress at this time  Discharge Destination: unknown      Brenna Costello CCC-SLP  10/2/2020

## 2020-10-02 NOTE — PLAN OF CARE
Problem: Adult Inpatient Plan of Care  Goal: Plan of Care Review  Outcome: Ongoing, Progressing  Flowsheets (Taken 10/2/2020 1049)  Plan of Care Reviewed With: (Sushila ABDULLAHI) other (see comments)  Outcome Summary: SLP follow up completed, s/p initial bedside swallowing evaluation completed on 10/01/2020. Spoke with Sushila ABDULLAHI, as intervention status now indicates comfort measures. RN stated pt was not appropriate for SLP discharge education re: aspiration risks with PO intake if PO is provided for comfort, stating pt hasn't been alert and has been moaning throughout the morning. If MD chooses to allow pt to have PO for comfort knowing the risks for aspiration, the safest PO diet would likely be that of a pureed diet consistency with honey thick liquids, however, safety cannot be ensured and all involved parties should be aware that the pt is at a high risk for aspiration, pneumonia, respiratory failure, or fatality if aspiration occurs. Continued SLP services are no longer warranted. MD to reconsult if changes or new concerns arise. Thanks!

## 2020-10-02 NOTE — DISCHARGE SUMMARY
[facility] Medicine Services  DISCHARGE SUMMARY       Date of Admission: 10/1/2020  Date of Discharge:  10/2/2020  Primary Care Physician: Slade Barroso MD    Presenting Problem/History of Present Illness:  Pneumonia of right lower lobe due to infectious organism [J18.9]     Final Discharge Diagnoses:  Active Hospital Problems    Diagnosis   • **Pneumonia of right lower lobe due to infectious organism   • Dementia with behavioral disturbance (CMS/[facility])   • History of pulmonary embolism   • Acute respiratory failure with hypoxia (CMS/[facility])   • Paroxysmal atrial fibrillation with rapid ventricular response (CMS/[facility])   • Sepsis (CMS/[facility])   • Hypertension   • Chronic back pain     per Nursing home paperwork         Consults: Palliative care consult.    Pertinent Test Results:   IMPRESSION: Chest x-ray.  Airspace disease in the right lung base concerning for pneumonia.    Interpretation Summary echocardiogram.    · Left ventricular ejection fraction appears to be 61 - 65%.  · Left ventricular diastolic function is consistent with (grade I) impaired relaxation.  · Abnormal global longitudinal LV strain (GLS) = -14.8%  · Mild tricuspid valve regurgitation is present.  · Mild pulmonary hypertension  · Mild to moderate aortic valve regurgitation is present.        Chief Complaint on Day of Discharge: none    History of Present Illness on Day of Discharge:   Patient is an 80-year-old white female nursing home resident history of Alzheimer's type dementia.  She was transferred over to the from the nursing home with apparently increasing shortness of breath.  She was found to be in some respiratory distress when she got here hypoxic placed on facemask oxygen.  Patient is a DNR/DNI.  Chest x-ray shows right lower infiltrate consistent with pneumonia.  Patient has been hospitalized here with what is thought to be probably aspiration pneumonia.  She does have a history listed as dysphasia.   She also has a recent history of this year with pulmonary embolism on anticoagulation for this.  Patient is essentially nonverbal unable to give any history.  Patient is also found to be in A. fib with rapid ventricular response.  He will be placed on a Cardizem drip cultured antibiotics initiated.  Admitted go ahead and broaden her coverage out due to the history of questionable aspiration as well as this will be about her fourth admission this year so probably better to treat her for healthcare acquired pneumonia.  She also has an elevated lactic of 6.9 so believe she qualifies for sepsis as well.  Her troponin is slightly elevated however it is about the same range that this is been in the past.  EKG does have some ST depression in the inferior lateral leads, but her rate is quite high.    Hospital Course:  The patient is a 80 y.o. female who presented to [facility] with mental status/sepsis/acute respiratory failure hypoxia/pneumonia/atrial for with RVR/hypotension natremia.      Altered mental status.  Patient does withdraw and grimace to pain.     Sepsis/acute respiratory failure with hypoxia/pneumonia/tachypneic.  Possible aspiration pneumonia.  Lactate 8.9.  On vancomycin and Zosyn.  Pulmonary toilet.  Mucinex.  Duo nebs.  Patient got 2600 cc bolus in ER.  Chest x-ray- Airspace disease in the right lung base concerning for pneumonia.     Atrial for with RVR/hypertension.  Cardene drip.  Change Eliquis to Lovenox therapeutic.  Echocardiogram-ejection fraction 61%, diastolic dysfunction grade 1, mild tricuspid valve regurgitation, mild pulmonary hypertension, mild to moderate aortic valve regurgitation.     Hypernatremia.  DC fludrocortisone.  DC fluids for now until echocardiogram come back.     History of pulmonary embolus.  On chronic anticoagulation.  Change Eliquis to Lovenox therapeutic.     Elevated d-dimer.  Patient on  anticoagulation.  No further work-up for now.     Diabetes.  Sliding  scale.  Hemoglobin A1c 6.4.     Alzheimer dementia with behavioral disturbance.  Hold Risperdal.     Chronic pain.  DC fentanyl patch until patient is more alert.  Hold [place].     Respiratory PCR.  Strep pneumo.  Legionella antigen.  MRSA PCR.  Urine culture pending.  [id number].    DNR/DNI/comfort.  Palliative care consult.  Prognosis guarded.  It was decided to put the patient comfort measure.  Most treatment was DC.  Patient go back to [facility] with hospice service.  Most form was signed.  Continue fentanyl patch, morphine for breakthrough pain, restart risperidone, add Haldol as needed, advance diet to purée honey thick given patient risks of aspiration, nasal cannula for comfort measure, Santa cath.    Condition on Discharge: stable    Physical Exam on Discharge:  /59 (BP Location: Right arm, Patient Position: Lying)   Pulse 102   Temp 98.5 °F (36.9 °C) (Axillary)   Resp 18   Wt 55 kg (121 lb 3.2 oz)   SpO2 95%   BMI 21.48 kg/m²   Physical Exam  Vitals signs and nursing note reviewed.   Constitutional:       Appearance: She is well-developed.   HENT:      Head: Normocephalic.   Eyes:      Conjunctiva/sclera: Conjunctivae normal.   Neck:      Musculoskeletal: Neck supple.      Vascular: No JVD.   Cardiovascular:      Heart sounds: Normal heart sounds. No murmur. No friction rub. No gallop.       Comments: Irregular.    Pulmonary:      Effort: No respiratory distress.      Breath sounds:     Comments: Slight wheezing bilateral.  Chest:      Chest wall: No tenderness.   Abdominal:      General: Bowel sounds are normal. There is no distension.      Palpations: Abdomen is soft.      Tenderness: There is no abdominal tenderness. There is no guarding or rebound.   Musculoskeletal:         General: No tenderness or deformity.   Skin:     General: Skin is warm and dry.      Capillary Refill: Capillary refill takes 2 to 3 seconds.      Findings: No rash.   Neurological:      Cranial  Nerves: No cranial nerve deficit.      Motor: Weakness present. No abnormal muscle tone.      Coordination: Coordination abnormal.      Gait: Gait abnormal.      Deep Tendon Reflexes: Reflexes normal.     Discharge Disposition: Discharged back to nursing home with hospice.  Hospice/Medical Facility (DC - External)    Discharge Medications:     Discharge Medications      New Medications      Instructions Start Date   acetaminophen 160 MG/5ML solution  Commonly known as: TYLENOL  Replaces: acetaminophen 325 MG tablet   650 mg, Oral, Every 4 Hours PRN      acetaminophen 650 MG suppository  Commonly known as: TYLENOL   650 mg, Rectal, Every 4 Hours PRN      haloperidol 2 MG/ML solution  Commonly known as: HALDOL   2 mg, Oral, Every 6 Hours PRN      morphine 100 MG/5ML solution concentrated solution   5 mg, Oral, Every 3 Hours PRN      risperiDONE 1 MG/ML oral solution  Commonly known as: risperDAL  Replaces: risperiDONE 0.25 MG tablet   0.25 mg, Oral, Every 12 Hours Scheduled         Continue These Medications      Instructions Start Date   fentaNYL 25 MCG/HR patch  Commonly known as: DURAGESIC   1 patch, Transdermal, Every 72 Hours   Start Date: October 5, 2020        Stop These Medications    acetaminophen 325 MG tablet  Commonly known as: TYLENOL  Replaced by: acetaminophen 160 MG/5ML solution     apixaban 5 MG tablet tablet  Commonly known as: ELIQUIS     fludrocortisone 0.1 MG tablet     HYDROcodone-acetaminophen 5-325 MG per tablet  Commonly known as: NORCO     megestrol acetate 400 MG/10ML suspension oral suspension  Commonly known as: MEGACE     risperiDONE 0.25 MG tablet  Commonly known as: risperDAL  Replaced by: risperiDONE 1 MG/ML oral solution            Discharge Diet:   Diet Instructions     Advance Diet As Tolerated      advance diet to purée honey thick    advance diet to purée honey thick          Activity at Discharge:   Activity Instructions     Activity as Tolerated            Discharge Care  Plan/Instructions: Discharged to nursing home with hospice.    Follow-up Appointments:   Follow-up with hospice as soon as able.  Follow-up with nursing physician as soon as able.    Electronically signed by Glenn Harding MD, 10/02/20, 11:02 CDT.    Time: Greater than 30 minutes.

## 2020-10-02 NOTE — PROGRESS NOTES
Palliative Care Daily Progress Note   goals of care/advanced care planning, support for patient/family, hospice referral/discussion, pain/symptom management and comfort care    Code Status and Medical Interventions:   Ordered at: 10/01/20 0422     Limited Support to NOT Include:    Intubation    Vasopressors    Dialysis     Level Of Support Discussed With:    Health Care Surrogate     Code Status:    No CPR     Medical Interventions (Level of Support Prior to Arrest):    Limited      Advanced Directives: Advance Directive Status: Patient has advance directive, copy requested   Goals of Care: Ongoing.     S: Medical record reviewed. Events noted.  Atrial fibrillation with RVR overnight, Cardizem drip was initiated but stopped due to hypotension.  She converted to normal sinus rhythm without further intervention.  Nursing verbalizes patient yells out frequently.  She grimaces with minimal stimulus this morning.  Due to the Palliative Care Topics Discussed: palliative care, goals of care, care options, resuscitation status, [facility] and discharge options we will establish an advance care plan.   Advance Care Planning   Advance Care Planning Discussion: Care conference with patient's DEEJAYAAlisha.  We explored current health status, multiple comorbidities including advanced dementia.  POA is able to verbalize patient has had substantial decline since January of this year.  She has been nonambulatory over the last 1-1/2 years.  She has had multiple aspiration events since the first of this year.  Family do not wish to pursue aggressive care interventions with feeding tube placement, further treatments, diagnostics, and monitoring.  We further discussed associated risk of feeding tube placement including risk of aspiration despite tube placement and decreased quality of life secondary to loss of oral intake.  Family have reached a juncture that they wish to transition with focus of quality of life and comfort and  "would like to transition back to nursing home with hospice services. We discussed restarting patient's home medications including opiates and antipsychotics with a goal of comfort. POA would like to have a pearce catheter placed, as \"being wet upsets\" the patient. POA would like to identify possibility of visitation given end-of-life situation.  Based upon these care goals we further reviewed and initiated medical orders for scope of treatment (MOST) form to further delineate care goals to include no CPR, comfort measures, no feeding tube.     Review of Systems   Unable to perform ROS: dementia     Pain Assessment  PAINAD Breathin-->normal  PAINAD Negative Vocalization: 2-->repeated troubled calling out, loud moaning/groaning, crying  PAINAD Facial Expression: 0-->smiling or inexpressive  PAINAD Body Language: 0-->relaxed  PAINAD Consolability: 1-->distracted or reassured by voice/touch  PAINAD Score: 3    O:     Intake/Output Summary (Last 24 hours) at 10/2/2020 0756  Last data filed at 10/2/2020 0330  Gross per 24 hour   Intake 1050 ml   Output --   Net 1050 ml       Diagnostics: Reviewed    Current Facility-Administered Medications   Medication Dose Route Frequency Provider Last Rate Last Dose   • acetaminophen (TYLENOL) 160 MG/5ML solution 650 mg  650 mg Oral Q4H PRN Shaun Mcknight MD        Or   • acetaminophen (TYLENOL) suppository 650 mg  650 mg Rectal Q4H PRN Shaun Mcknight MD   650 mg at 10/02/20 0006   • acetaminophen (TYLENOL) tablet 650 mg  650 mg Oral Q4H PRN Shaun Mcknight MD       • albuterol (PROVENTIL) nebulizer solution 0.083% 2.5 mg/3mL  2.5 mg Nebulization Once PRN Shaun Mcknight MD       • dextrose (D50W) 25 g/ 50mL Intravenous Solution 25 g  25 g Intravenous Q15 Min PRN Shaun Mcknight MD       • dextrose (GLUTOSE) oral gel 15 g  15 g Oral Q15 Min PRN Shaun Mcknight MD       • dilTIAZem (CARDIZEM) 125 mg in sodium chloride 0.9 % 125 mL (1 mg/mL) infusion  5-15 mg/hr " Intravenous Continuous Shaun Mcknight MD   Stopped at 10/02/20 0327   • enoxaparin (LOVENOX) syringe 50 mg  1 mg/kg Subcutaneous Q12H Glenn Harding MD   50 mg at 10/01/20 2057   • famotidine (PEPCID) injection 20 mg  20 mg Intravenous Daily Glenn Harding MD   20 mg at 10/01/20 1254   • glucagon (human recombinant) (GLUCAGEN DIAGNOSTIC) injection 1 mg  1 mg Subcutaneous Q15 Min PRN Shaun Mcknight MD       • insulin lispro (humaLOG) injection 2-7 Units  2-7 Units Subcutaneous TID AC Shaun Mcknight MD       • ipratropium-albuterol (DUO-NEB) nebulizer solution 3 mL  3 mL Nebulization 4x Daily - RT Shaun Mcknight MD   3 mL at 10/02/20 0733   • nitroglycerin (NITROSTAT) SL tablet 0.4 mg  0.4 mg Sublingual Q5 Min PRN Shaun Mcknight MD       • ondansetron (ZOFRAN) tablet 4 mg  4 mg Oral Q6H PRN Shaun Mcknight MD        Or   • ondansetron (ZOFRAN) injection 4 mg  4 mg Intravenous Q6H PRN Shaun Mcknight MD       • piperacillin-tazobactam (ZOSYN) 3.375 g in iso-osmotic dextrose 50 ml (premix)  3.375 g Intravenous Q8H Shaun Mcknight MD 0 mL/hr at 10/02/20 0109 3.375 g at 10/02/20 0439   • sodium chloride 0.45 % infusion  75 mL/hr Intravenous Continuous Glenn Harding MD 75 mL/hr at 10/02/20 0338 75 mL/hr at 10/02/20 0338   • sodium chloride 0.9 % flush 10 mL  10 mL Intravenous PRN Shaun Mcknight MD       • sodium chloride 0.9 % flush 10 mL  10 mL Intravenous Q12H Shaun Mcknight MD   10 mL at 10/01/20 2058   • sodium chloride 0.9 % flush 10 mL  10 mL Intravenous PRN Shaun Mcknihgt MD       • vancomycin (VANCOCIN) in iso-osmotic dextrose IVPB 1 g (premix) 200 mL  1,000 mg Intravenous Q24H Shaun Mcknight MD   1,000 mg at 10/01/20 1249     dilTIAZem, 5-15 mg/hr, Last Rate: Stopped (10/02/20 0327)  sodium chloride, 75 mL/hr, Last Rate: 75 mL/hr (10/02/20 0338)      •  [DISCONTINUED] acetaminophen **OR** acetaminophen **OR** acetaminophen  •  acetaminophen  •  albuterol  •   dextrose  •  dextrose  •  glucagon (human recombinant)  •  nitroglycerin  •  ondansetron **OR** ondansetron  •  [COMPLETED] Insert peripheral IV **AND** sodium chloride  •  sodium chloride    A:    /90 (BP Location: Right arm, Patient Position: Lying)   Pulse 82   Temp 97.4 °F (36.3 °C) (Axillary)   Resp 18   Wt 55 kg (121 lb 3.2 oz)   SpO2 99%   BMI 21.48 kg/m²     Vitals signs and nursing note reviewed.   Constitutional:       Appearance: Frail. Chronically ill-appearing and acutely ill-appearing.   Eyes:      General: Lids are normal.   HENT:      Head: Normocephalic and atraumatic.   Neck:      Musculoskeletal: Neck supple.   Pulmonary:      Effort: Pulmonary effort is normal. She becomes tachypneic with minimal stimulus.  Cardiovascular:      Tachycardia present.   Edema:     Peripheral edema present.  Abdominal:      General: Bowel sounds are decreased.   Musculoskeletal: Normal range of motion.   Skin:     General: Skin is warm and dry.   Neurological:      Mental Status: Lethargic. Grimaces with minimal stimulus.      Comments: Dementia       Patient status: Disease state: Deteriorating despite treatments.  Functional status: Palliative Performance Scale Score: Performance 10% based on the following measures: Ambulation: Totally bed bound, Activity and Evidence of Disease: Unable to do any work, extensive evidence of disease, Self-Care: Total care required,  Intake: Mouth care only, LOC: Drowsy or comatose   Nutritional status: Albumin 3.60. Body mass index is 20.93 kg/m².      Family support: The patient receives support from her son and extended family..  Advance Directives: Advance directive on file     POA/Healthcare surrogate-Jonna Reydon-POA/daughter-in-law.     Impression/Problem List:     1.  Alzheimer's dementia-FAST 7D  2.  Pneumonia of right lower lobe, concerns for aspiration  3.  Acute respiratory failure with hypoxia  4.  Paroxysmal atrial fibrillation with RVR  5.  Sepsis  6.   Hypertension  7.  Chronic back pain  8.  History of pulmonary embolism  9.  Advanced age  10.  Aphasia     Recommendations/Plan:  1. plan: Goals of care include CODE STATUS no CPR/comfort measures.     2.  Palliative care encounter  -Plan back to [facility] with hospice services.  Informed CM.  Consult placed.  POA would like to ensure visitation for end of life.  -Reviewed and initiated the MOST form.  Attending to complete.  Informed Dr. Harding electronically.    3.  Comfort care  -Restart fentanyl transdermal patch 25 mcg.  -Restart opiates as needed for breakthrough pain with morphine concentrated solution 5 mg every 3 hours as needed.  -Restart risperidone 0.25 mg concentrated solution every 12 hours  -Add Haldol 2 mg concentrated solution every 6 hours as needed for agitation  -Advance diet to puréed honey thick given patient's high risk for aspiration at family's request for comfort.  -Nasal cannula for comfort as needed.  -Santa for comfort  -We will d/c treatments, monitoring, and diagnostics with a goal of comfort.        Thank you for allowing us to participate in patient's plan of care. Palliative Care Team will continue to follow patient.     Time spent: 95 minutes spent reviewing medical and medication records, assessing and examining patient, discussing with family, answering questions, providing some guidance about a plan and documentation of care, and coordinating care with other healthcare members, with > 50% time spent face to face.   50 minutes spent on advance care planning.    Casie Schafer, APRN  10/2/2020

## 2020-10-03 LAB
BACTERIA SPEC AEROBE CULT: ABNORMAL
GRAM STN SPEC: ABNORMAL
GRAM STN SPEC: ABNORMAL
ISOLATED FROM: ABNORMAL

## 2020-10-06 LAB — BACTERIA SPEC AEROBE CULT: NORMAL

## 2020-10-13 RX ORDER — FENTANYL 25 UG/H
PATCH TRANSDERMAL
Qty: 10 PATCH | Refills: 0 | Status: SHIPPED | OUTPATIENT
Start: 2020-10-13 | End: 2020-11-11

## 2020-11-11 RX ORDER — FENTANYL 25 UG/H
PATCH TRANSDERMAL
Qty: 10 PATCH | Refills: 0 | Status: SHIPPED | OUTPATIENT
Start: 2020-11-11 | End: 2020-12-14

## 2020-12-14 RX ORDER — FENTANYL 25 UG/H
PATCH TRANSDERMAL
Qty: 10 PATCH | Refills: 0 | Status: SHIPPED | OUTPATIENT
Start: 2020-12-14 | End: 2021-01-13

## 2020-12-28 ENCOUNTER — TELEPHONE (OUTPATIENT)
Dept: INTERNAL MEDICINE CLINIC | Age: 80
End: 2020-12-28

## 2020-12-28 NOTE — TELEPHONE ENCOUNTER
Hospice new admit today - you have the CTI info in your  tray   Dx alzheimer's     Bridger Amis is there to admit her today and pt is not  not eating or drinking, has fallen out of bed twice recently, restless, agitated. Pt is nonverbal.   Need verbal CTI and ok to start comfort renny  meds.    Please advise     Allergies sulfa , ultram